# Patient Record
Sex: MALE | Race: WHITE | NOT HISPANIC OR LATINO | Employment: OTHER | URBAN - METROPOLITAN AREA
[De-identification: names, ages, dates, MRNs, and addresses within clinical notes are randomized per-mention and may not be internally consistent; named-entity substitution may affect disease eponyms.]

---

## 2017-01-11 ENCOUNTER — OFFICE VISIT (OUTPATIENT)
Dept: PSYCHIATRY | Facility: CLINIC | Age: 62
End: 2017-01-11
Payer: OTHER GOVERNMENT

## 2017-01-11 DIAGNOSIS — F41.9 ANXIETY DISORDER, UNSPECIFIED TYPE: Primary | ICD-10-CM

## 2017-01-11 PROCEDURE — 90791 PSYCH DIAGNOSTIC EVALUATION: CPT | Mod: S$GLB,,, | Performed by: SOCIAL WORKER

## 2017-01-11 NOTE — PROGRESS NOTES
Psychiatry Initial Visit (PhD/LCSW)  Diagnostic Interview - CPT 27209    Date: 2017    Site: [facility]    Referral source: Dr. Dumont    Clinical status of patient: Outpatient    Epifanio Guallpa, a 61 y.o. male, for initial evaluation visit.  Met with patient.    Chief complaint/reason for encounter: anxiety    History of present illness: Patient reports that he has been having anxiety problems. Reported that he retired 11 year ago to help care for his parents (mom  in , dad  in September). Identified that the anxiety has increased since his father . Said he has had anxiety his entire life, but it usually comes and goes. Said that the anxiety is particularly bad in the morning. Identified that once he starts doing things, the anxiety decreases. Said that he recently was diagnosed with a hernia and chipped a tooth, both of which are bothersome to him. Said that he has always been healthy. Identified that he knows he doesn't have anything to be anxious about, and it bothers him that he feels anxious. Said that he is sleeping well. Denied change in memory or concentration. Reported a history of panic attacks. Said that most frequently, he has a panic attack due to dreams about being in a confined space. Identified that he recently had a panic attack at the dentist. Said that he is going to go to an oral surgeon for repair the tooth, so he's hoping the sedation will help. Denied irritability. Identified that he is a worrier, and worries about everything. Identified that he looks at the past frequently. Said that he has always been a loner, so he doesn't have a big support system. [place] process. Endorsed symptoms of anxiety including excessive anxiety/worry and muscle tension. Denied agoraphobia. Said that he had OCD symptoms as a child, but that has gone away. Reported some feelings of depression. Said there is some  "decrease in interest. Identified that he doesn't think he has enough outside interests, but has been like that since he was a child. Denied thoughts about death or suicidal ideation. Expressed that he was close to his father, and he is probably not over his death yet. Said that his father always decreased his worry and made him feel better about things. Denied trauma history.     Discussed course of treatment. Practiced mindfulness. Gave resources for mindfulness and the [facility]. Discussed transition to new therapist.    Pain: 0    Symptoms:   · Mood: depressed mood  · Anxiety: excessive anxiety/worry, muscle tension and panic attacks  · Substance abuse: denied  · Cognitive functioning: denied  · Health behaviors: noncontributory    Psychiatric history: psychotropic management by PCP    Medical history: hernia    Family history of psychiatric illness: none    Social history (marriage, employment, etc.): Grew up in [place]. Lived with parents, brother, and sister. Reports that his childhood was "fine." Said that he was always a loner and stayed in the house a lot as a child. Said that he regrets that now. Completed HS. 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 Currently lives alone. Brother and sister want him to sell their parent's house and move [place] the lake. Never , no children. Exercises daily. Mu-ism, goes to [employer] weekly and prays daily.     Substance use:   Alcohol: 1/week   Drugs: none   Tobacco: none   Caffeine: 1 cup of coffee/day    Current medications and drug reactions (include OTC, herbal): see medication list. Started Prozac 12/28/16; said that he feels a little better. Has PRN prescription of Valium, but hasn't taken any.    Strengths and liabilities: Strength: Patient accepts guidance/feedback, Strength: Patient is expressive/articulate., Liability: Patient lacks coping skills.    Current Evaluation:     Mental Status Exam:  General Appearance:  unremarkable, age appropriate "   Speech: normal tone, normal rate, normal pitch, normal volume      Level of Cooperation: cooperative      Thought Processes: normal and logical   Mood: steady      Thought Content: normal, no suicidality, no homicidality, delusions, or paranoia   Affect: congruent and appropriate   Orientation: Oriented x3   Memory: recent >  intact, remote >  intact   Attention Span & Concentration: intact   Fund of General Knowledge: intact and appropriate to age and level of education   Abstract Reasoning: did not assess   Judgment & Insight: fair     Language  intact     Diagnostic Impression - Plan:       ICD-10-CM ICD-9-CM   1. Anxiety disorder, unspecified type F41.9 300.00       Plan:individual psychotherapy and medication management by physician    Return to Clinic: 2 weeks    Length of Service (minutes): 45     AMIRAH WilcoxW-BACS

## 2017-03-01 ENCOUNTER — TELEPHONE (OUTPATIENT)
Dept: SURGERY | Facility: CLINIC | Age: 62
End: 2017-03-01

## 2017-03-01 DIAGNOSIS — K40.90 RIGHT INGUINAL HERNIA: Primary | ICD-10-CM

## 2017-03-01 NOTE — TELEPHONE ENCOUNTER
Pt called to reschedule his right inguinal hernia repair that was postponed in November 2016.  [facility] Repair scheduled for 3/15/17 with a Pre-Op appointment with Dr. Goldberg for the day prior on 3/14/17 at 10am.  He is aware of appointment date and time and is agreeable to this plan of care.  Reminder letter mailed for the Pre-Op appointment.

## 2017-03-01 NOTE — TELEPHONE ENCOUNTER
----- Message from Faby Machado sent at 3/1/2017 10:15 AM CST -----  Contact: self  Pt would like to schedule surgery for hernia repair. Please call Epifanio cris @ [phone].Thank you.

## 2017-03-14 ENCOUNTER — TELEPHONE (OUTPATIENT)
Dept: SURGERY | Facility: CLINIC | Age: 62
End: 2017-03-14

## 2017-03-14 ENCOUNTER — OFFICE VISIT (OUTPATIENT)
Dept: SURGERY | Facility: CLINIC | Age: 62
End: 2017-03-14
Payer: OTHER GOVERNMENT

## 2017-03-14 ENCOUNTER — ANESTHESIA EVENT (OUTPATIENT)
Dept: SURGERY | Facility: HOSPITAL | Age: 62
End: 2017-03-14
Payer: OTHER GOVERNMENT

## 2017-03-14 VITALS
HEART RATE: 62 BPM | TEMPERATURE: 98 F | BODY MASS INDEX: 23.57 KG/M2 | HEIGHT: 72 IN | SYSTOLIC BLOOD PRESSURE: 137 MMHG | DIASTOLIC BLOOD PRESSURE: 85 MMHG | WEIGHT: 174 LBS

## 2017-03-14 DIAGNOSIS — K40.90 RIGHT INGUINAL HERNIA: ICD-10-CM

## 2017-03-14 PROCEDURE — 99999 PR PBB SHADOW E&M-EST. PATIENT-LVL III: CPT | Mod: PBBFAC,,, | Performed by: SURGERY

## 2017-03-14 PROCEDURE — 1160F RVW MEDS BY RX/DR IN RCRD: CPT | Mod: S$GLB,,, | Performed by: SURGERY

## 2017-03-14 PROCEDURE — 99213 OFFICE O/P EST LOW 20 MIN: CPT | Mod: 57,S$GLB,, | Performed by: SURGERY

## 2017-03-14 RX ORDER — ONDANSETRON 4 MG/1
4 TABLET, FILM COATED ORAL 2 TIMES DAILY
Qty: 12 TABLET | Refills: 3 | Status: SHIPPED | OUTPATIENT
Start: 2017-03-14 | End: 2017-03-14

## 2017-03-14 NOTE — PATIENT INSTRUCTIONS
What Is a Hernia?    A hernia is when an organ or tissue pushes through a weak area in the belly (abdominal) wall. This weak area may be present at birth. Or it may be caused by abdominal strain over time. If not treated, a hernia can get worse with time and physical stress.  When a bulge forms  When there is a weak area in the abdominal wall, an organ or tissue can push outward. This often causes a bulge that you can see under your skin. The bulge may get bigger when you stand up. It may go away when you lie down. You may also feel some pressure or mild pain when lifting, coughing, urinating, or doing other activities.  Types of hernias  The type of hernia you have depends on its location. Most hernias form in the groin at or near the internal ring. This is the entrance to a canal between the abdomen and groin. Hernias can also occur in the abdomen, thigh, or genitals.  · An incisional hernia occurs at the site of a previous surgical incision.  · An umbilical hernia occurs at the navel.  · An indirect inguinal hernia occurs in the groin at the internal ring.  · A direct inguinal hernia occurs in the groin near the internal ring.  · A femoral hernia occurs just below the groin.  · An epigastric hernia occurs in the upper abdomen at the midline.  Diagnosis  In most cases, your healthcare provider can diagnose a hernia by doing a physical exam.  In some cases it might not be clear why you have a swelling in the belly wall. Then your provider may order an imaging test such as an ultrasound. This can help with the diagnosis.  Surgery  A hernia will not heal on its own. Surgery is needed to fix the weak spot in the abdominal wall. If not treated, a hernia can get larger. It can also cause serious health problems. The good news is that hernia surgery can be done quickly and safely. In some cases, you can go home the same day as your surgery.   When to call your provider  Call your healthcare provider right away if the  swelling around your hernia becomes larger, firmer, or more painful. These may be signs that your intestines are stuck in the abdominal wall. This is an emergency. The hernia must be repaired right away to avoid serious problems.  Date Last Reviewed: 7/1/2016 © 2000-2016 [employer]. [address] [place] [address]. All rights reserved. This information is not intended as a substitute for professional medical care. Always follow your healthcare professional's instructions.        How a Hernia Develops  Although a hernia bulge may appear suddenly, hernias often take years to develop. They grow larger as pressure inside the body presses the intestines or other tissues out through a weak area in the abdominal wall, often at the belly button or a site of previous surgery. With time, these tissues can bulge out beneath the skin.  Stages of hernia development    The wall weakens or tears. The abdominal lining bulges out through a weak area and begins to form a hernia sac. The sac may contain fat, intestine, or other tissues. At this point, the hernia may or may not cause a visible bulge.        The intestine pushes into the sac. As the intestine pushes further into the sac, it forms a visible bulge. The bulge may flatten when you lie down or push against it. This is called a reducible hernia and does not cause any immediate danger.             The intestine may become trapped. The sac containing the intestine may become trapped by muscle (incarcerated). If this happens, you wont be able to flatten the bulge. You may also have pain. Prompt treatment is needed.        The intestine may become strangulated. If the intestine is tightly trapped, it becomes strangulated. The strangulated area loses blood supply and may die. This can cause severe pain and block the intestine. Emergency surgery is needed.   Date Last Reviewed: 8/1/2016  © 4588-5652 [employer]. [address] [place] [address],  JENNY Boggs [address]. All rights reserved. This information is not intended as a substitute for professional medical care. Always follow your healthcare professional's instructions.        Having Hernia Surgery: Patch Repair  Surgery treats a hernia by repairing the weakness in the abdominal wall. An incision is made so the surgeon has a direct view of the hernia. The repair is then done through this incision (open surgery). To repair the defect, special mesh materials are used to patch the weak area and make a tension-free repair. Follow your healthcare providers advice on how to get ready for the procedure. You can usually go home the same day as your surgery. In some cases, though, you may need to stay in the hospital overnight.  Getting ready for surgery  Your healthcare provider will talk with you about preparing for surgery. Follow all the instructions youre given and be sure to:   · Tell your healthcare provider about any medicines, supplements, or herbs you take. This includes both prescription and over-the-counter items.  · Stop taking aspirin, ibuprofen, naproxen and other NSAIDs as directed.  · Arrange for an adult family member or friend to give you a ride home after surgery.  · Stop smoking. Smoking affects blood flow and can slow healing.  · Gently wash the surgical area the night before surgery.  · Dont eat or drink after midnight, the night before your surgery. You may need to take some medicine with sips of water on the day of surgery. Talk with your healthcare provider.     Repair in Front           Repair in Back           Combination Repair      The day of surgery  Arrive at the hospital or surgical center at your scheduled time. Youll be asked to change into a patient gown. Youll then be given an IV to provide fluids and medicine. Shortly before surgery, an anesthesiologist or nurse anesthetist will talk with you. He or she will explain the types of anesthesia used to prevent pain during  surgery. You will have one or more of the following:  · Monitored sedation to make you relaxed and sleepy.  · Local anesthesia to numb the surgical site.  · Regional anesthesia to numb specific areas of your body.  · General anesthesia to let you sleep during surgery.  During the surgery  Most hernias are treated using tension-free repairs. This is surgery that uses special mesh materials to repair the weak area. Unlike traditional repairs, the abdominal fascia (tissue around the muscle that gives strength to the abdominal wall) isnt sewn together. Instead, the mesh covers the weak area like a patch. This repairs the defect without tension on the muscles. It also makes it less likely to happen again. The mesh is made of strong, flexible plastic that stays in the body. Over time, nearby tissues grow into the mesh to strengthen the repair.  After surgery  When the procedure is over, youll be taken to the recovery area to rest. Your blood pressure and heart rate will be monitored. Youll also have a bandage over the surgical site. To help reduce discomfort, youll be given pain medicines. You may also be given breathing exercises to keep your lungs clear. Later, youll be asked to get up and walk. This helps prevent blood clots in the legs. You can go home when your healthcare provider says youre ready.     Risks and complications  Hernia surgery is safe, but does have risks including:  · Bleeding  · Infection  · Anesthesia risks  · Mesh complications  · Inability to urinate   · Bowel or bladder injury   · Numbness or pain in the groin or leg  · Risk the hernia will happen again  · Damage to the testicles or testicular function      Date Last Reviewed: 10/1/2016  © 7765-8088 [employer]. [address] [place] [address], [place] [address]. All rights reserved. This information is not intended as a substitute for professional medical care. Always follow your healthcare professional's  instructions.        After Hernia Surgery    You can usually go home the same day as surgery. If you had surgery to repair ventral or incisional hernia, you may need to stay in the hospital overnight. To speed healing, take an active role in your recovery. The tips below can help.  Reducing swelling  Early on, its common for the area around your incision to be swollen, bruised, and sore. To reduce swelling, put an ice pack or bag of frozen peas in a thin towel. Place the towel on the swollen area 3 to 5 times a day for 15 to 20 minutes at a time.  Managing pain  Take any prescribed pain medicines as directed. Be aware that some pain medicines can cause constipation. So your healthcare provider may also suggest a laxative or stool softener.  Returning to normal  You can return to your normal routine as soon as you feel able. Just take it easy and follow these guidelines:  · Take short walks to improve circulation.  · Avoid heavy lifting for at least a week.  · Ask your healthcare provider when you can drive and return to work.  · You can have sex again when you feel ready.  Follow-up care  Be sure to keep all follow-up appointments with your healthcare provider. These ensure youre healing well. During visits, your stitches, staples, or bandage may be removed.  When to call your healthcare provider   Call your healthcare provider if you have any of the following:  · A large amount of swelling or bruising (some testicular swelling and bruising is normal)  · Fever of 100.4°F (38°C) or higher, or as directed by your healthcare provider  · Pain, redness, bleeding, or fluid from the incision that gets worse  · Trouble urinating  · Constipation  · Vomiting   Date Last Reviewed: 10/1/2016  © 8138-1337 [employer]. [address] [place] [address], [place] [address]. All rights reserved. This information is not intended as a substitute for professional medical care. Always follow your healthcare professional's  instructions.

## 2017-03-14 NOTE — PROGRESS NOTES
History & Physical    SUBJECTIVE:     History of Present Illness:  Patient is a 62 y.o. male presents with a right inguinal hernia. Per patient it developed suddenly 1/2 year ago. + discomfort especially when he lifts heavy objects.   He describes a pressure/soreness feeling associated with it. He denies any skin changes. He denies any nausea or vomiting. He states that he has never noticed a bulge in his groin, but went to see his PCP because of the pain/discomfort.    Chief Complaint   Patient presents with    Pre-op Exam       Review of patient's allergies indicates:  No Known Allergies    Current Outpatient Prescriptions   Medication Sig Dispense Refill    ADACEL,TDAP ADOLESN/ADULT,,PF, 2 Lf-(2.5-5-3-5 mcg)-5Lf/0.5 mL injection inject 0.5 milliliter intramuscularly  0    aspirin (ECOTRIN) 81 MG EC tablet Take 81 mg by mouth once daily.      FLUARIX QUAD 4282-5360, PF, 60 mcg (15 mcg x 4)/0.5 mL Syrg inject 0.5 milliliter intramuscularly  0    fluoxetine (PROZAC) 20 MG capsule Take 1 capsule (20 mg total) by mouth once daily. 30 capsule 1    multivitamin (THERAGRAN) per tablet Take 1 tablet by mouth once daily.      diazePAM (VALIUM) 5 MG tablet Take 1 tablet (5 mg total) by mouth every 6 (six) hours as needed for Anxiety. 10 tablet 0     No current facility-administered medications for this visit.        Past Medical History:   Diagnosis Date    Kidney stone     Kidney stone      Past Surgical History:   Procedure Laterality Date    TONSILLECTOMY      WISDOM TOOTH EXTRACTION       Family History   Problem Relation Age of Onset    Heart disease Father     Hypertension Father     Alzheimer's disease Mother     No Known Problems Sister     No Known Problems Brother     Heart disease Maternal Grandmother     Heart attack Maternal Grandmother     No Known Problems Maternal Grandfather     No Known Problems Paternal Grandmother     No Known Problems Paternal Grandfather      Social History    Substance Use Topics    Smoking status: Never Smoker    Smokeless tobacco: Never Used    Alcohol use 0.6 oz/week     1 Glasses of wine, 0 Standard drinks or equivalent per week      Comment: occasionally- once a week        Review of Systems:  Review of Systems   Constitutional: Negative for activity change, fever and unexpected weight change.   Respiratory: Negative for cough and shortness of breath.    Cardiovascular: Negative for chest pain and palpitations.   Gastrointestinal: Negative for abdominal distention, abdominal pain, nausea and vomiting.   Endocrine: Negative for cold intolerance and heat intolerance.   Musculoskeletal: Negative for arthralgias and myalgias.   Neurological: Negative for dizziness and light-headedness.       OBJECTIVE:     Vital Signs (Most Recent)  Temp: 98 °F (36.7 °C) (03/14/17 1003)  Pulse: 62 (03/14/17 1003)  BP: 137/85 (03/14/17 1003)  6' (1.829 m)  78.9 kg (174 lb)     Physical Exam:  Physical Exam   Constitutional: He is oriented to person, place, and time. He appears well-developed and well-nourished. No distress.   HENT:   Head: Normocephalic and atraumatic.   Cardiovascular: Normal rate and regular rhythm.    Pulmonary/Chest: Effort normal. No respiratory distress.   Abdominal: Soft. He exhibits no distension. There is no tenderness. A hernia is present.   Neurological: He is alert and oriented to person, place, and time.   Skin: Skin is warm and dry.   Psychiatric: He has a normal mood and affect. His behavior is normal.   No obvious groin bulge, possible small right inguinal hernia      ASSESSMENT/PLAN:     62 yo male with a symptomatic small right inguinal hernia    PLAN:    Open repair with mesh. I have explained the risks, benefits, and alternatives of the procedure in detail. The patient has had the opportunity to ask questions about the procedure and all questions have been answered to their satisfaction. The patient has signed informed consent and agrees to  proceed as planned.         ATTENDING ATTESTATION: Patient seen and examined. My examination confirms the resident's findings and I agree with his assessment and plan above.

## 2017-03-14 NOTE — TELEPHONE ENCOUNTER
Pt notified to [facility] tomorrow at 6:30am for surgery with Dr. Goldberg.  Pre-Op instructions given at appointment this morning in clinic.  He verbalized understanding.

## 2017-03-14 NOTE — ANESTHESIA PREPROCEDURE EVALUATION
03/14/2017    Pre-operative evaluation for Procedure(s) (LRB):  REPAIR-HERNIA-INGUINAL Open Right with Mesh (Right)    Epifanio Guallpa is a 62 y.o. male with no significant PMH who is being evaluated for the above procedure secondary to right inguinal hernia.     LDA:  None     Prev airway:  None on file     Drips: none     Patient Active Problem List   Diagnosis    Right inguinal hernia       Review of patient's allergies indicates:  No Known Allergies     No current facility-administered medications on file prior to encounter.      Current Outpatient Prescriptions on File Prior to Encounter   Medication Sig Dispense Refill    aspirin (ECOTRIN) 81 MG EC tablet Take 81 mg by mouth once daily.      multivitamin (THERAGRAN) per tablet Take 1 tablet by mouth once daily.      diazePAM (VALIUM) 5 MG tablet Take 1 tablet (5 mg total) by mouth every 6 (six) hours as needed for Anxiety. 10 tablet 0       Past Surgical History:   Procedure Laterality Date    TONSILLECTOMY      WISDOM TOOTH EXTRACTION         Social History     Social History    Marital status: Single     Spouse name: N/A    Number of children: N/A    Years of education: N/A     Occupational History    Not on file.     Social History Main Topics    Smoking status: Never Smoker    Smokeless tobacco: Never Used    Alcohol use 0.6 oz/week     1 Glasses of wine, 0 Standard drinks or equivalent per week      Comment: occasionally- once a week    Drug use: No    Sexual activity: Not Currently     Partners: Female     Other Topics Concern    Not on file     Social History Narrative         Vital Signs Range (Last 24H):  Temp:  [36.7 °C (98 °F)]   Pulse:  [62]   BP: (137)/(85)       CBC: No results for input(s): WBC, RBC, HGB, HCT, PLT, MCV, MCH, MCHC in the last 72 hours.    CMP: No results for input(s): NA, K, CL, CO2, BUN, CREATININE,  GLU, MG, PHOS, CALCIUM, ALBUMIN, PROT, ALKPHOS, ALT, AST, BILITOT in the last 72 hours.    INR  No results for input(s): INR, PROTIME, APTT in the last 72 hours.    Invalid input(s): PT        Diagnostic Studies:      EKG:Vent. Rate : 061 BPM     Atrial Rate : 061 BPM     P-R Int : 146 ms          QRS Dur : 140 ms      QT Int : 448 ms       P-R-T Axes : 054 078 045 degrees     QTc Int : 450 ms    Normal sinus rhythm  Right bundle branch block  Abnormal ECG  When compared with ECG of 31-OCT-1987 22:45,  Right bundle branch block is now Present  Confirmed by Ramírez Reilly MD ([id number]) on 11/8/2016 11:45:07 PM      2D Echo: none on file           [facility] Anesthesia Evaluation    I have reviewed the Patient Summary Reports.     I have reviewed the Medications.     Review of Systems  Anesthesia Hx:  History of prior surgery of interest to airway management or planning: Previous anesthesia: General appendectomy in childhood  with general anesthesia.  Denies Family Hx of Anesthesia complications.   Denies Personal Hx of Anesthesia complications.   Social:  Non-Smoker    Cardiovascular:  Cardiovascular Normal     Pulmonary:  Pulmonary Normal    Hepatic/GI:  Hepatic/GI Normal    Neurological:  Neurology Normal    Endocrine:  Endocrine Normal        Physical Exam  General:  Well nourished    Airway/Jaw/Neck:  Airway Findings: Mouth Opening: Normal Tongue: Normal  General Airway Assessment: Adult, Good  Mallampati: I  Improves to I with phonation.  TM Distance: Normal, at least 6 cm  Jaw/Neck Findings:     Neck ROM: Normal ROM      Dental:  Dental Findings: In tact    Chest/Lungs:  Chest/Lungs Findings: Clear to auscultation, Normal Respiratory Rate     Heart/Vascular:  Heart Findings: Rate: Normal  Rhythm: Regular Rhythm  Sounds: Normal             Anesthesia Plan  Type of Anesthesia, risks & benefits discussed:  Anesthesia Type:  MAC, general  Patient's Preference:   Intra-op Monitoring Plan: standard ASA monitors  Intra-op Monitoring  Plan Comments:   Post Op Pain Control Plan:   Post Op Pain Control Plan Comments:   Induction:   IV  Beta Blocker:  Patient is not currently on a Beta-Blocker (No further documentation required).       Informed Consent: Patient understands risks and agrees with Anesthesia plan.  Questions answered. Anesthesia consent signed with patient.  ASA Score: 1     Day of Surgery Review of History & Physical:    H&P update referred to the surgeon.         Ready For Surgery From Anesthesia Perspective.

## 2017-03-14 NOTE — MR AVS SNAPSHOT
[facility]  [address]  [place] [address]  Phone: [phone]                  Epifanio Guallpa   3/14/2017 10:00 AM   Office Visit    Description:  Male : 1955   Provider:  Joshua Goldberg, MD   Department:  [facility]           Reason for Visit     Pre-op Exam                To Do List           Your Future Surgeries/Procedures     Mar 15, 2017   Surgery with Joshua Goldberg, MD   [facility])    [address] [facility] [address] [phone]              Goals (5 Years of Data)     None      [facility]     [facility] Care Line -  Assistance  Registered nurses in the [facility] provide clinical advisement, health education, appointment booking, and other advisory services.  Call for this free service at [phone].             Medications           Message regarding Medications     Verify the changes and/or additions to your medication regime listed below are the same as discussed with your clinician today.  If any of these changes or additions are incorrect, please notify your healthcare provider.             Verify that the below list of medications is an accurate representation of the medications you are currently taking.  If none reported, the list may be blank. If incorrect, please contact your healthcare provider. Carry this list with you in case of emergency.           Current Medications     ADACEL,TDAP ADOLESN/ADULT,,PF, 2 Lf-(2.5-5-3-5 mcg)-5Lf/0.5 mL injection inject 0.5 milliliter intramuscularly    aspirin (ECOTRIN) 81 MG EC tablet Take 81 mg by mouth once daily.    FLUARIX QUAD 9519-4437, PF, 60 mcg (15 mcg x 4)/0.5 mL Syrg inject 0.5 milliliter intramuscularly    fluoxetine (PROZAC) 20 MG capsule Take 1 capsule (20 mg total) by mouth once daily.    multivitamin (THERAGRAN) per tablet Take 1 tablet by mouth once daily.    diazePAM (VALIUM) 5 MG  tablet Take 1 tablet (5 mg total) by mouth every 6 (six) hours as needed for Anxiety.           Clinical Reference Information           Your Vitals Were     BP Pulse Temp Height Weight BMI    137/85 62 98 °F (36.7 °C) (Oral) 6' (1.829 m) 78.9 kg (174 lb) 23.6 kg/m2      Blood Pressure          Most Recent Value    BP  137/85      Allergies as of 3/14/2017     No Known Allergies      Immunizations Administered on Date of Encounter - 3/14/2017     None      Instructions      What Is a Hernia?    A hernia is when an organ or tissue pushes through a weak area in the belly (abdominal) wall. This weak area may be present at birth. Or it may be caused by abdominal strain over time. If not treated, a hernia can get worse with time and physical stress.  When a bulge forms  When there is a weak area in the abdominal wall, an organ or tissue can push outward. This often causes a bulge that you can see under your skin. The bulge may get bigger when you stand up. It may go away when you lie down. You may also feel some pressure or mild pain when lifting, coughing, urinating, or doing other activities.  Types of hernias  The type of hernia you have depends on its location. Most hernias form in the groin at or near the internal ring. This is the entrance to a canal between the abdomen and groin. Hernias can also occur in the abdomen, thigh, or genitals.  · An incisional hernia occurs at the site of a previous surgical incision.  · An umbilical hernia occurs at the navel.  · An indirect inguinal hernia occurs in the groin at the internal ring.  · A direct inguinal hernia occurs in the groin near the internal ring.  · A femoral hernia occurs just below the groin.  · An epigastric hernia occurs in the upper abdomen at the midline.  Diagnosis  In most cases, your healthcare provider can diagnose a hernia by doing a physical exam.  In some cases it might not be clear why you have a swelling in the belly wall. Then your provider may  order an imaging test such as an ultrasound. This can help with the diagnosis.  Surgery  A hernia will not heal on its own. Surgery is needed to fix the weak spot in the abdominal wall. If not treated, a hernia can get larger. It can also cause serious health problems. The good news is that hernia surgery can be done quickly and safely. In some cases, you can go home the same day as your surgery.   When to call your provider  Call your healthcare provider right away if the swelling around your hernia becomes larger, firmer, or more painful. These may be signs that your intestines are stuck in the abdominal wall. This is an emergency. The hernia must be repaired right away to avoid serious problems.  Date Last Reviewed: 7/1/2016 © 2000-2016 [employer]. [address] [place] [address], [place] [address]. All rights reserved. This information is not intended as a substitute for professional medical care. Always follow your healthcare professional's instructions.        How a Hernia Develops  Although a hernia bulge may appear suddenly, hernias often take years to develop. They grow larger as pressure inside the body presses the intestines or other tissues out through a weak area in the abdominal wall, often at the belly button or a site of previous surgery. With time, these tissues can bulge out beneath the skin.  Stages of hernia development    The wall weakens or tears. The abdominal lining bulges out through a weak area and begins to form a hernia sac. The sac may contain fat, intestine, or other tissues. At this point, the hernia may or may not cause a visible bulge.        The intestine pushes into the sac. As the intestine pushes further into the sac, it forms a visible bulge. The bulge may flatten when you lie down or push against it. This is called a reducible hernia and does not cause any immediate danger.             The intestine may become trapped. The sac containing the intestine may become trapped  by muscle (incarcerated). If this happens, you wont be able to flatten the bulge. You may also have pain. Prompt treatment is needed.        The intestine may become strangulated. If the intestine is tightly trapped, it becomes strangulated. The strangulated area loses blood supply and may die. This can cause severe pain and block the intestine. Emergency surgery is needed.   Date Last Reviewed: 8/1/2016 © 2000-2016 "[employer]. [address] [place] [address], [place] [address]. All rights reserved. This information is not intended as a substitute for professional medical care. Always follow your healthcare professional's instructions.        Having Hernia Surgery: Patch Repair  Surgery treats a hernia by repairing the weakness in the abdominal wall. An incision is made so the surgeon has a direct view of the hernia. The repair is then done through this incision (open surgery). To repair the defect, special mesh materials are used to patch the weak area and make a tension-free repair. Follow your healthcare providers advice on how to get ready for the procedure. You can usually go home the same day as your surgery. In some cases, though, you may need to stay in the hospital overnight.  Getting ready for surgery  Your healthcare provider will talk with you about preparing for surgery. Follow all the instructions youre given and be sure to:   · Tell your healthcare provider about any medicines, supplements, or herbs you take. This includes both prescription and over-the-counter items.  · Stop taking aspirin, ibuprofen, naproxen and other NSAIDs as directed.  · Arrange for an adult family member or friend to give you a ride home after surgery.  · Stop smoking. Smoking affects blood flow and can slow healing.  · Gently wash the surgical area the night before surgery.  · Dont eat or drink after midnight, the night before your surgery. You may need to take some medicine with sips of water on the day of  surgery. Talk with your healthcare provider.     Repair in Front           Repair in Back           Combination Repair      The day of surgery  Arrive at the hospital or surgical center at your scheduled time. Youll be asked to change into a patient gown. Youll then be given an IV to provide fluids and medicine. Shortly before surgery, an anesthesiologist or nurse anesthetist will talk with you. He or she will explain the types of anesthesia used to prevent pain during surgery. You will have one or more of the following:  · Monitored sedation to make you relaxed and sleepy.  · Local anesthesia to numb the surgical site.  · Regional anesthesia to numb specific areas of your body.  · General anesthesia to let you sleep during surgery.  During the surgery  Most hernias are treated using tension-free repairs. This is surgery that uses special mesh materials to repair the weak area. Unlike traditional repairs, the abdominal fascia (tissue around the muscle that gives strength to the abdominal wall) isnt sewn together. Instead, the mesh covers the weak area like a patch. This repairs the defect without tension on the muscles. It also makes it less likely to happen again. The mesh is made of strong, flexible plastic that stays in the body. Over time, nearby tissues grow into the mesh to strengthen the repair.  After surgery  When the procedure is over, youll be taken to the recovery area to rest. Your blood pressure and heart rate will be monitored. Youll also have a bandage over the surgical site. To help reduce discomfort, youll be given pain medicines. You may also be given breathing exercises to keep your lungs clear. Later, youll be asked to get up and walk. This helps prevent blood clots in the legs. You can go home when your healthcare provider says youre ready.     Risks and complications  Hernia surgery is safe, but does have risks including:  · Bleeding  · Infection  · Anesthesia risks  · Mesh  complications  · Inability to urinate   · Bowel or bladder injury   · Numbness or pain in the groin or leg  · Risk the hernia will happen again  · Damage to the testicles or testicular function      Date Last Reviewed: 10/1/2016  © 1746-7831 [employer]. [address] [place] [address]. All rights reserved. This information is not intended as a substitute for professional medical care. Always follow your healthcare professional's instructions.        After Hernia Surgery    You can usually go home the same day as surgery. If you had surgery to repair ventral or incisional hernia, you may need to stay in the hospital overnight. To speed healing, take an active role in your recovery. The tips below can help.  Reducing swelling  Early on, its common for the area around your incision to be swollen, bruised, and sore. To reduce swelling, put an ice pack or bag of frozen peas in a thin towel. Place the towel on the swollen area 3 to 5 times a day for 15 to 20 minutes at a time.  Managing pain  Take any prescribed pain medicines as directed. Be aware that some pain medicines can cause constipation. So your healthcare provider may also suggest a laxative or stool softener.  Returning to normal  You can return to your normal routine as soon as you feel able. Just take it easy and follow these guidelines:  · Take short walks to improve circulation.  · Avoid heavy lifting for at least a week.  · Ask your healthcare provider when you can drive and return to work.  · You can have sex again when you feel ready.  Follow-up care  Be sure to keep all follow-up appointments with your healthcare provider. These ensure youre healing well. During visits, your stitches, staples, or bandage may be removed.  When to call your healthcare provider   Call your healthcare provider if you have any of the following:  · A large amount of swelling or bruising (some testicular swelling and bruising is normal)  · Fever of  100.4°F (38°C) or higher, or as directed by your healthcare provider  · Pain, redness, bleeding, or fluid from the incision that gets worse  · Trouble urinating  · Constipation  · Vomiting   Date Last Reviewed: 10/1/2016  © 1111-7721 [employer]. [address] [place] [address]. All rights reserved. This information is not intended as a substitute for professional medical care. Always follow your healthcare professional's instructions.             Language Assistance Services     ATTENTION: Language assistance services are available, free of charge. Please call [phone].      ATENCIÓN: Si habla dari, tiene a mercer disposición servicios gratuitos de asistencia lingüística. Llame al [phone].     CHÚ Ý: N?u b?n nói Ti?ng Vi?t, có các d?ch v? h? tr? ngôn ng? mi?n phí dành cho b?n. G?i s? [phone].         [facility] complies with applicable Federal civil rights laws and does not discriminate on the basis of race, color, national origin, age, disability, or sex.

## 2017-03-15 ENCOUNTER — SURGERY (OUTPATIENT)
Age: 62
End: 2017-03-15

## 2017-03-15 ENCOUNTER — HOSPITAL ENCOUNTER (OUTPATIENT)
Facility: HOSPITAL | Age: 62
Discharge: HOME | End: 2017-03-15
Attending: SURGERY | Admitting: SURGERY
Payer: OTHER GOVERNMENT

## 2017-03-15 ENCOUNTER — ANESTHESIA (OUTPATIENT)
Dept: SURGERY | Facility: HOSPITAL | Age: 62
End: 2017-03-15
Payer: OTHER GOVERNMENT

## 2017-03-15 DIAGNOSIS — K40.90 RIGHT INGUINAL HERNIA: ICD-10-CM

## 2017-03-15 PROBLEM — Z98.890 S/P RIGHT INGUINAL HERNIA REPAIR: Status: ACTIVE | Noted: 2017-03-15

## 2017-03-15 PROBLEM — Z87.19 S/P RIGHT INGUINAL HERNIA REPAIR: Status: ACTIVE | Noted: 2017-03-15

## 2017-03-15 PROCEDURE — 71000044 HC DOSC ROUTINE RECOVERY FIRST HOUR: Performed by: SURGERY

## 2017-03-15 PROCEDURE — 71000015 HC POSTOP RECOV 1ST HR: Performed by: SURGERY

## 2017-03-15 PROCEDURE — 49505 PRP I/HERN INIT REDUC >5 YR: CPT | Mod: RT,,, | Performed by: SURGERY

## 2017-03-15 PROCEDURE — 25000003 PHARM REV CODE 250: Performed by: SURGERY

## 2017-03-15 PROCEDURE — 36000707: Performed by: SURGERY

## 2017-03-15 PROCEDURE — 25000003 PHARM REV CODE 250: Performed by: PHYSICIAN ASSISTANT

## 2017-03-15 PROCEDURE — C1781 MESH (IMPLANTABLE): HCPCS | Performed by: SURGERY

## 2017-03-15 PROCEDURE — D9220A PRA ANESTHESIA: Mod: ,,, | Performed by: ANESTHESIOLOGY

## 2017-03-15 PROCEDURE — 63600175 PHARM REV CODE 636 W HCPCS: Performed by: STUDENT IN AN ORGANIZED HEALTH CARE EDUCATION/TRAINING PROGRAM

## 2017-03-15 PROCEDURE — 37000009 HC ANESTHESIA EA ADD 15 MINS: Performed by: SURGERY

## 2017-03-15 PROCEDURE — 36000706: Performed by: SURGERY

## 2017-03-15 PROCEDURE — 25000003 PHARM REV CODE 250: Performed by: STUDENT IN AN ORGANIZED HEALTH CARE EDUCATION/TRAINING PROGRAM

## 2017-03-15 PROCEDURE — 37000008 HC ANESTHESIA 1ST 15 MINUTES: Performed by: SURGERY

## 2017-03-15 DEVICE — MESH POLY KNIT PERFIX PLG LG+: Type: IMPLANTABLE DEVICE | Site: INGUINAL | Status: FUNCTIONAL

## 2017-03-15 RX ORDER — SODIUM CHLORIDE 9 MG/ML
INJECTION, SOLUTION INTRAVENOUS CONTINUOUS
Status: DISCONTINUED | OUTPATIENT
Start: 2017-03-15 | End: 2017-03-15 | Stop reason: HOSPADM

## 2017-03-15 RX ORDER — HYDROMORPHONE HYDROCHLORIDE 1 MG/ML
0.2 INJECTION, SOLUTION INTRAMUSCULAR; INTRAVENOUS; SUBCUTANEOUS EVERY 5 MIN PRN
Status: DISCONTINUED | OUTPATIENT
Start: 2017-03-15 | End: 2017-03-15 | Stop reason: HOSPADM

## 2017-03-15 RX ORDER — HYDROCODONE BITARTRATE AND ACETAMINOPHEN 5; 325 MG/1; MG/1
1 TABLET ORAL EVERY 6 HOURS PRN
Qty: 31 TABLET | Refills: 0 | Status: SHIPPED | OUTPATIENT
Start: 2017-03-15 | End: 2017-03-28

## 2017-03-15 RX ORDER — HYDROCODONE BITARTRATE AND ACETAMINOPHEN 10; 325 MG/1; MG/1
1 TABLET ORAL EVERY 4 HOURS PRN
Status: DISCONTINUED | OUTPATIENT
Start: 2017-03-15 | End: 2017-03-15 | Stop reason: HOSPADM

## 2017-03-15 RX ORDER — PROPOFOL 10 MG/ML
VIAL (ML) INTRAVENOUS CONTINUOUS PRN
Status: DISCONTINUED | OUTPATIENT
Start: 2017-03-15 | End: 2017-03-15

## 2017-03-15 RX ORDER — LIDOCAINE HYDROCHLORIDE 10 MG/ML
INJECTION, SOLUTION EPIDURAL; INFILTRATION; INTRACAUDAL; PERINEURAL
Status: DISCONTINUED | OUTPATIENT
Start: 2017-03-15 | End: 2017-03-15 | Stop reason: HOSPADM

## 2017-03-15 RX ORDER — BUPIVACAINE HYDROCHLORIDE 5 MG/ML
INJECTION, SOLUTION EPIDURAL; INTRACAUDAL
Status: DISCONTINUED | OUTPATIENT
Start: 2017-03-15 | End: 2017-03-15 | Stop reason: HOSPADM

## 2017-03-15 RX ORDER — IBUPROFEN 200 MG
600 TABLET ORAL ONCE
Status: DISCONTINUED | OUTPATIENT
Start: 2017-03-15 | End: 2017-03-15 | Stop reason: HOSPADM

## 2017-03-15 RX ORDER — SODIUM CHLORIDE 0.9 % (FLUSH) 0.9 %
3 SYRINGE (ML) INJECTION
Status: DISCONTINUED | OUTPATIENT
Start: 2017-03-15 | End: 2017-03-15 | Stop reason: HOSPADM

## 2017-03-15 RX ORDER — HYDROCODONE BITARTRATE AND ACETAMINOPHEN 5; 325 MG/1; MG/1
1 TABLET ORAL EVERY 4 HOURS PRN
Status: DISCONTINUED | OUTPATIENT
Start: 2017-03-15 | End: 2017-03-15 | Stop reason: HOSPADM

## 2017-03-15 RX ORDER — ACETAMINOPHEN 10 MG/ML
1000 INJECTION, SOLUTION INTRAVENOUS ONCE
Status: DISCONTINUED | OUTPATIENT
Start: 2017-03-15 | End: 2017-03-15 | Stop reason: HOSPADM

## 2017-03-15 RX ORDER — MIDAZOLAM HYDROCHLORIDE 1 MG/ML
INJECTION, SOLUTION INTRAMUSCULAR; INTRAVENOUS
Status: DISCONTINUED | OUTPATIENT
Start: 2017-03-15 | End: 2017-03-15

## 2017-03-15 RX ADMIN — SODIUM CHLORIDE, SODIUM GLUCONATE, SODIUM ACETATE, POTASSIUM CHLORIDE, MAGNESIUM CHLORIDE, SODIUM PHOSPHATE, DIBASIC, AND POTASSIUM PHOSPHATE: .53; .5; .37; .037; .03; .012; .00082 INJECTION, SOLUTION INTRAVENOUS at 08:03

## 2017-03-15 RX ADMIN — MIDAZOLAM HYDROCHLORIDE 2 MG: 1 INJECTION, SOLUTION INTRAMUSCULAR; INTRAVENOUS at 07:03

## 2017-03-15 RX ADMIN — SODIUM CHLORIDE: 0.9 INJECTION, SOLUTION INTRAVENOUS at 07:03

## 2017-03-15 RX ADMIN — PROPOFOL 50 MCG/KG/MIN: 10 INJECTION, EMULSION INTRAVENOUS at 08:03

## 2017-03-15 RX ADMIN — Medication 2 G: at 08:03

## 2017-03-15 RX ADMIN — LIDOCAINE HYDROCHLORIDE 20 ML: 10 INJECTION, SOLUTION EPIDURAL; INFILTRATION; INTRACAUDAL; PERINEURAL at 08:03

## 2017-03-15 RX ADMIN — BUPIVACAINE HYDROCHLORIDE 20 ML: 5 INJECTION, SOLUTION EPIDURAL; INTRACAUDAL; PERINEURAL at 08:03

## 2017-03-15 NOTE — IP AVS SNAPSHOT
[facility]  [address]  [facility] [address]  Phone: [phone]           Patient Discharge Instructions     Our goal is to set you up for success. This packet includes information on your condition, medications, and your home care. It will help you to care for yourself so you don't get sicker and need to go back to the hospital.     Please ask your nurse if you have any questions.        There are many details to remember when preparing to leave the hospital. Here is what you will need to do:    1. Take your medicine. If you are prescribed medications, review your Medication List in the following pages. You may have new medications to  at the pharmacy and others that you'll need to stop taking. Review the instructions for how and when to take your medications. Talk with your doctor or nurses if you are unsure of what to do.     2. Go to your follow-up appointments. Specific follow-up information is listed in the following pages. Your may be contacted by a transition nurse or clinical provider about future appointments. Be sure we have all of the phone numbers to reach you, if needed. Please contact your provider's office if you are unable to make an appointment.     3. Watch for warning signs. Your doctor or nurse will give you detailed warning signs to watch for and when to call for assistance. These instructions may also include educational information about your condition. If you experience any of warning signs to your health, call your doctor.               [facility]  Unless otherwise directed by your provider, please contact [facility], our nurse care line that is available for 24/7 assistance.     [phone] (toll-free)    Registered nurses in the [facility] provide clinical advisement, health education, appointment booking, and other advisory services.                    ** Verify the list of medication(s) below is accurate and up  to date. Carry this with you in case of emergency. If your medications have changed, please notify your healthcare provider.             Medication List      START taking these medications        Additional Info                      hydrocodone-acetaminophen 5-325mg 5-325 mg per tablet   Commonly known as:  NORCO   Quantity:  31 tablet   Refills:  0   Dose:  1 tablet    Instructions:  Take 1 tablet by mouth every 6 (six) hours as needed for Pain.     Begin Date    AM    Noon    PM    Bedtime         CONTINUE taking these medications        Additional Info                      aspirin 81 MG EC tablet   Commonly known as:  ECOTRIN   Refills:  0   Dose:  81 mg    Instructions:  Take 81 mg by mouth once daily.     Begin Date    AM    Noon    PM    Bedtime       diazePAM 5 MG tablet   Commonly known as:  VALIUM   Quantity:  10 tablet   Refills:  0   Dose:  5 mg    Instructions:  Take 1 tablet (5 mg total) by mouth every 6 (six) hours as needed for Anxiety.     Begin Date    AM    Noon    PM    Bedtime       multivitamin per tablet   Commonly known as:  THERAGRAN   Refills:  0   Dose:  1 tablet    Instructions:  Take 1 tablet by mouth once daily.     Begin Date    AM    Noon    PM    Bedtime            Where to Get Your Medications      You can get these medications from any pharmacy     Bring a paper prescription for each of these medications     hydrocodone-acetaminophen 5-325mg 5-325 mg per tablet                  Please bring to all follow up appointments:    1. A copy of your discharge instructions.  2. All medicines you are currently taking in their original bottles.  3. Identification and insurance card.    Please arrive 15 minutes ahead of scheduled appointment time.    Please call 24 hours in advance if you must reschedule your appointment and/or time.        Follow-up Information     Follow up with Joshua Goldberg, MD. Schedule an appointment as soon as possible for a visit in 2 weeks.    Specialty:  General  Surgery    Why:  s/p right inguinal hernia repair    Contact information:    [address]  [facility] [address]  [phone]          Discharge Instructions     Future Orders    Call MD for:  difficulty breathing, headache or visual disturbances     Call MD for:  redness, tenderness, or signs of infection (pain, swelling, redness, odor or green/yellow discharge around incision site)     Call MD for:  severe uncontrolled pain     Call MD for:  temperature >100.4     Diet general     Questions:    Total calories:      Fat restriction, if any:      Protein restriction, if any:      Na restriction, if any:      Fluid restriction:      Additional restrictions:      Other restrictions (specify):     Comments:    May resume diet as tolerated.   No heavy lifting or strenuous exercise until cleared by physician.  May shower in 48 hours; no baths or submerging in water (swimming,etc) for at least 2 weeks  Keep incision clean with soap and water.  Leave steri strips in place they will fall off on their own  Monitor for temp > 101.4, bleeding, redness, purulent drainage, or any extreme pain. If any occur, please call MD or go to ER.  Please resume all home meds and take newly prescribed meds.  Follow up with Dr. Goldberg in 2 weeks in clinic for post-op check. If no appointment made in 1 week, please call clinic.    Remove dressing in 48 hours         Primary Diagnosis     Your primary diagnosis was:  Status Post Right Inguinal Hernia Repair      Admission Information     Date & Time Provider Department [facility]    3/15/2017  6:12 AM Joshua Goldberg, MD [id number]      Care Providers     Provider Role Specialty Primary office phone    Joshua Goldberg, MD Attending Provider General Surgery [phone]    Joshua Goldberg, MD Surgeon  General Surgery [phone]      Your Vitals Were     BP Pulse Temp Resp Height Weight    129/80 (BP Location: Left arm, Patient Position: Lying, BP Method:  Automatic) 64 97.9 °F (36.6 °C) (Temporal) 18 6' (1.829 m) 78.9 kg (174 lb)    SpO2 BMI             99% 23.6 kg/m2         Recent Lab Values     No lab values to display.      Allergies as of 3/15/2017     No Known Allergies      Advance Directives     An advance directive is a document which, in the event you are no longer able to make decisions for yourself, tells your healthcare team what kind of treatment you do or do not want to receive, or who you would like to make those decisions for you.  If you do not currently have an advance directive, [facility] encourages you to create one.  For more information call:  [phone]), [phone] ([phone]),  or log on to [email or web address].        Language Assistance Services     ATTENTION: Language assistance services are available, free of charge. Please call [phone].      ATENCIÓN: Si habla español, tiene a mercer disposición servicios gratuitos de asistencia lingüística. Llame al [phone].     [facility] Ý: N?u b?n nói Ti?ng Vi?t, có các d?ch v? h? tr? ngôn ng? mi?n phí dành cho b?n. G?i s? [phone].         [facility] complies with applicable Federal civil rights laws and does not discriminate on the basis of race, color, national origin, age, disability, or sex.

## 2017-03-15 NOTE — H&P (VIEW-ONLY)
History & Physical    SUBJECTIVE:     History of Present Illness:  Patient is a 62 y.o. male presents with a right inguinal hernia. Per patient it developed suddenly 1/2 year ago. + discomfort especially when he lifts heavy objects.   He describes a pressure/soreness feeling associated with it. He denies any skin changes. He denies any nausea or vomiting. He states that he has never noticed a bulge in his groin, but went to see his PCP because of the pain/discomfort.    Chief Complaint   Patient presents with    Pre-op Exam       Review of patient's allergies indicates:  No Known Allergies    Current Outpatient Prescriptions   Medication Sig Dispense Refill    ADACEL,TDAP ADOLESN/ADULT,,PF, 2 Lf-(2.5-5-3-5 mcg)-5Lf/0.5 mL injection inject 0.5 milliliter intramuscularly  0    aspirin (ECOTRIN) 81 MG EC tablet Take 81 mg by mouth once daily.      FLUARIX QUAD [id number], PF, 60 mcg (15 mcg x 4)/0.5 mL Syrg inject 0.5 milliliter intramuscularly  0    fluoxetine (PROZAC) 20 MG capsule Take 1 capsule (20 mg total) by mouth once daily. 30 capsule 1    multivitamin (THERAGRAN) per tablet Take 1 tablet by mouth once daily.      diazePAM (VALIUM) 5 MG tablet Take 1 tablet (5 mg total) by mouth every 6 (six) hours as needed for Anxiety. 10 tablet 0     No current facility-administered medications for this visit.        Past Medical History:   Diagnosis Date    Kidney stone     Kidney stone      Past Surgical History:   Procedure Laterality Date    TONSILLECTOMY      WISDOM TOOTH EXTRACTION       Family History   Problem Relation Age of Onset    Heart disease Father     Hypertension Father     Alzheimer's disease Mother     No Known Problems Sister     No Known Problems Brother     Heart disease Maternal Grandmother     Heart attack Maternal Grandmother     No Known Problems Maternal Grandfather     No Known Problems Paternal Grandmother     No Known Problems Paternal Grandfather      Social History    Substance Use Topics    Smoking status: Never Smoker    Smokeless tobacco: Never Used    Alcohol use 0.6 oz/week     1 Glasses of wine, 0 Standard drinks or equivalent per week      Comment: occasionally- once a week        Review of Systems:  Review of Systems   Constitutional: Negative for activity change, fever and unexpected weight change.   Respiratory: Negative for cough and shortness of breath.    Cardiovascular: Negative for chest pain and palpitations.   Gastrointestinal: Negative for abdominal distention, abdominal pain, nausea and vomiting.   Endocrine: Negative for cold intolerance and heat intolerance.   Musculoskeletal: Negative for arthralgias and myalgias.   Neurological: Negative for dizziness and light-headedness.       OBJECTIVE:     Vital Signs (Most Recent)  Temp: 98 °F (36.7 °C) (03/14/17 1003)  Pulse: 62 (03/14/17 1003)  BP: 137/85 (03/14/17 1003)  6' (1.829 m)  78.9 kg (174 lb)     Physical Exam:  Physical Exam   Constitutional: He is oriented to person, place, and time. He appears well-developed and well-nourished. No distress.   HENT:   Head: Normocephalic and atraumatic.   Cardiovascular: Normal rate and regular rhythm.    Pulmonary/Chest: Effort normal. No respiratory distress.   Abdominal: Soft. He exhibits no distension. There is no tenderness. A hernia is present.   Neurological: He is alert and oriented to person, place, and time.   Skin: Skin is warm and dry.   Psychiatric: He has a normal mood and affect. His behavior is normal.   No obvious groin bulge, possible small right inguinal hernia      ASSESSMENT/PLAN:     62 yo male with a symptomatic small right inguinal hernia    PLAN:    Open repair with mesh. I have explained the risks, benefits, and alternatives of the procedure in detail. The patient has had the opportunity to ask questions about the procedure and all questions have been answered to their satisfaction. The patient has signed informed consent and agrees to  proceed as planned.         ATTENDING ATTESTATION: Patient seen and examined. My examination confirms the resident's findings and I agree with his assessment and plan above.

## 2017-03-15 NOTE — TRANSFER OF CARE
Anesthesia Transfer of Care Note    Patient: Epifanio Guallpa    Procedure(s) Performed: Procedure(s) (LRB):  REPAIR-HERNIA-INGUINAL Open Right with Mesh (Right)    Patient location: [facility]    Anesthesia Type: MAC    Transport from OR: Transported from OR on 6-10 L/min O2 by face mask with adequate spontaneous ventilation    Post pain: adequate analgesia    Post assessment: no apparent anesthetic complications    Post vital signs: stable    Level of consciousness: awake and alert    Nausea/Vomiting: no nausea/vomiting    Complications: none          Last vitals:   Visit Vitals    BP (!) 147/81 (BP Location: Left arm, Patient Position: Lying, BP Method: Automatic)    Pulse 68    Temp 36.8 °C (98.2 °F) (Oral)    Resp 18    Ht 6' (1.829 m)    Wt 78.9 kg (174 lb)    SpO2 99%    BMI 23.6 kg/m2

## 2017-03-15 NOTE — ANESTHESIA RELEASE NOTE
Anesthesia Release from PACU Note    Patient: Epifanio Guallpa    Procedure(s) Performed: Procedure(s) (LRB):  REPAIR-HERNIA-INGUINAL Open Right with Mesh (Right)    Anesthesia type: general    Post pain: Adequate analgesia    Post assessment: no apparent anesthetic complications, tolerated procedure well and no evidence of recall    Last Vitals:   Visit Vitals    /80 (BP Location: Left arm, Patient Position: Lying, BP Method: Automatic)    Pulse 64    Temp 36.6 °C (97.9 °F) (Temporal)    Resp 18    Ht 6' (1.829 m)    Wt 78.9 kg (174 lb)    SpO2 99%    BMI 23.6 kg/m2       Post vital signs: stable    Level of consciousness: awake, alert  and oriented    Nausea/Vomiting: no nausea/no vomiting    Complications: none    Airway Patency: patent    Respiratory: unassisted, spontaneous ventilation    Cardiovascular: stable and blood pressure at baseline    Hydration: euvolemic

## 2017-03-15 NOTE — ANESTHESIA POSTPROCEDURE EVALUATION
Anesthesia Post Evaluation    Patient: Epifanio Guallpa    Procedure(s) Performed: Procedure(s) (LRB):  REPAIR-HERNIA-INGUINAL Open Right with Mesh (Right)    Final Anesthesia Type: general  Patient location during evaluation: PACU  Patient participation: Yes- Able to Participate  Level of consciousness: awake and alert  Post-procedure vital signs: reviewed and stable  Pain management: adequate  Airway patency: patent  PONV status at discharge: No PONV  Anesthetic complications: no      Cardiovascular status: blood pressure returned to baseline and hemodynamically stable  Respiratory status: unassisted, spontaneous ventilation and room air  Hydration status: euvolemic  Follow-up not needed.        Visit Vitals    /80 (BP Location: Left arm, Patient Position: Lying, BP Method: Automatic)    Pulse 64    Temp 36.6 °C (97.9 °F) (Temporal)    Resp 18    Ht 6' (1.829 m)    Wt 78.9 kg (174 lb)    SpO2 99%    BMI 23.6 kg/m2       Pain/Mike Score: Pain Assessment Performed: Yes (3/15/2017  9:45 AM)  Presence of Pain: denies (3/15/2017  9:45 AM)  Mike Score: 10 (3/15/2017  9:45 AM)

## 2017-03-15 NOTE — PLAN OF CARE
Problem: Patient Care Overview  Goal: Plan of Care Review  Outcome: Outcome(s) achieved Date Met:  03/15/17  Discharge instructions given and explained to patient and family with verbalization of understanding all instructions. Prescription given and explained next time and doses of each medication. Patients v/s stable, denies n/v and tolerating po, rates pain level tolerable, IV removed, and family at bedside for patient discharge home. Anesthesia and surgical consent in pt's chart at time of discharge.

## 2017-03-15 NOTE — BRIEF OP NOTE
[facility]  Brief Operative Note     SUMMARY     Surgery Date: 3/15/2017     Surgeon(s) and Role:     * Joshua Goldberg, MD - Primary     * Hardeep Henriquez MD - Resident - Assisting    Pre-op Diagnosis:  Right inguinal hernia [K40.90]    Post-op Diagnosis:  Post-Op Diagnosis Codes:     * Right inguinal hernia [K40.90]    Procedure(s) (LRB):  REPAIR-HERNIA-INGUINAL Open Right with Mesh (Right)    Anesthesia: Local MAC    Description of the findings of the procedure: plug and patch repair of R inguinal hernia    Findings/Key Components: indirect hernia with cord lipoma, successful repair    Estimated Blood Loss: minimal          Specimens:   Specimen     None          Discharge Note    SUMMARY     Admit Date: 3/15/2017    Discharge Date and Time:  03/15/2017     Hospital Course (synopsis of major diagnoses, care, treatment, and services provided during the course of the hospital stay): The patient underwent Procedure(s) (LRB):  REPAIR-HERNIA-INGUINAL Open Right with Mesh (Right). The patient tolerated the outpatient procedure without issue and was discharged home.      Final Diagnosis: Post-Op Diagnosis Codes:     * Right inguinal hernia [K40.90]    Disposition: Home or Self Care    Follow Up/Patient Instructions:   May resume diet as tolerated.   No heavy lifting or strenuous exercise until cleared by physician.  May shower in 48 hours; no baths or submerging in water (swimming,etc) for at least 2 weeks  Keep incision clean with soap and water.  Leave steri strips in place they will fall off on their own  Monitor for temp > 101.4, bleeding, redness, purulent drainage, or any extreme pain. If any occur, please call MD or go to ER.  Please resume all home meds and take newly prescribed meds.  Follow up with Dr. Goldberg in 2 weeks in clinic for post-op check. If no appointment made in 1 week, please call clinic.      Medications:  Reconciled Home Medications:   Current Discharge Medication List       CONTINUE these medications which have NOT CHANGED    Details   aspirin (ECOTRIN) 81 MG EC tablet Take 81 mg by mouth once daily.      multivitamin (THERAGRAN) per tablet Take 1 tablet by mouth once daily.      diazePAM (VALIUM) 5 MG tablet Take 1 tablet (5 mg total) by mouth every 6 (six) hours as needed for Anxiety.  Qty: 10 tablet, Refills: 0    Associated Diagnoses: Anxiety           No discharge procedures on file.

## 2017-03-15 NOTE — PROGRESS NOTES
Page to resident for Dr. Goldberg to notify pt still needs updated to H&P, waiting for return call.

## 2017-03-15 NOTE — INTERVAL H&P NOTE
The patient has been examined and the H&P has been reviewed:    I concur with the findings and no changes have occurred since H&P was written.    Anesthesia/Surgery risks, benefits and alternative options discussed and understood by patient/family.          Active Hospital Problems    Diagnosis  POA    Right inguinal hernia [K40.90]  Yes      Resolved Hospital Problems    Diagnosis Date Resolved POA   No resolved problems to display.

## 2017-03-15 NOTE — OP NOTE
DATE OF PROCEDURE: 03/15/2017     PREOPERATIVE DIAGNOSIS: Right inguinal hernia.     POSTOPERATIVE DIAGNOSIS: Right inguinal hernia.     PROCEDURE PERFORMED: Right indirect inguinal hernia repair with mesh.     ATTENDING SURGEON: Joshua Goldberg, M.D.     HOUSESTAFF SURGEON: Hardeep Henriquez M.D. (RES)     ANESTHESIA: Monitored anesthesia care plus local.     ESTIMATED BLOOD LOSS: 10 mL.     FINDINGS: Right-sized indirect inguinal hernia repaired with plug-and-patch polypropylene mesh.    SPECIMEN: none     DRAINS: None.     COMPLICATIONS: None.     INDICATIONS: Epifanio Guallpa is a 62 y.o.male referred to my General Surgery Clinic with a history of a symptomatic, reducible inguinal bulge. The history and exam were consistent with inguinal hernia. We recommended an open inguinal hernia repair with mesh and the patient agreed to proceed. The patient signed informed consent and expressed understanding of the risks and benefits of surgery.     DESCRIPTION OF OPERATIVE PROCEDURE: The patient was identified in preoperative holding and brought back to the Operating Room. The patient was placed supine on the operating table and padded appropriately. Monitors were applied and monitored anesthesia care was initiated. His right groin was shaved with electric clippers and prepped and draped in the standard sterile surgical fashion. A timeout was performed and all team members present agreed this was the correct procedure on the correct side of the correct patient. We also confirmed administration of appropriate preoperative antibiotics.     We marked out an appropriate right inguinal incision and administered a mix of lidocaine and Marcaine. After assuring adequate local anesthesia, a 5 cm oblique skin incision was made. Subcutaneous tissue was divided with Bovie electrocautery. This dissection was carried down through [place] fascia down to the aponeurosis of the external oblique. The aponeurosis of the external oblique was  incised in line with its fibers, first with a scalpel and then Metzenbaum scissors, and this incision was extended to the external spermatic ring. The inguinal canal contents were bluntly encircled, first digitally and then with a Penrose drain. Weitlaner retractors were placed to facilitate the dissection. We proceeded to identify an indirect inguinal hernia sac, which contained a cord lipoma with no evidence of bowel involvement. The hernia sac was carefully dissected away from the inguinal canal contents until it could be reduced into the defect.     We performed skeletonization of the spermatic cord. We then had appropriate borders of the inguinal canal identified and decided to repair the defect with a piece of polypropylene mesh and Large Bard plug. The plug was placed into the defect and secured with 2-0 Prolene sutures. The mesh was cut to fit the defect appropriately and sewn in place with interrupted 2-0 Prolene suture. Medially, the mesh was anchored to the fascia overlying the pubic tubercle. Inferiorly, the mesh was anchored to the shelving edge of the inguinal ligament. Superiorly, the mesh was anchored to the conjoined tendon. The tails of the mesh were brought together around the cord structures creating a new internal ring that just admitted the tip of the finger. The mesh was inspected and noted to lie in appropriate position without any redundancy or tension. The testicle was pulled back down to the scrotum and there was noted to be no tension on the cord structures. The aponeurosis of the external oblique was closed with a running 3-0 Vicryl suture. [place] fascia was closed with several buried interrupted 3-0 Vicryl sutures and the skin was closed with a running subcuticular Monocryl suture. A sterile dressing was applied. The patient was then transported to the Recovery Room in stable condition. All sponge, instrument and needle counts were correct at the end of the case. I was present and  scrubbed for the entire procedure.

## 2017-03-16 VITALS
HEIGHT: 72 IN | BODY MASS INDEX: 23.57 KG/M2 | TEMPERATURE: 98 F | WEIGHT: 174 LBS | OXYGEN SATURATION: 100 % | HEART RATE: 52 BPM | SYSTOLIC BLOOD PRESSURE: 136 MMHG | DIASTOLIC BLOOD PRESSURE: 81 MMHG | RESPIRATION RATE: 18 BRPM

## 2017-03-17 ENCOUNTER — TELEPHONE (OUTPATIENT)
Dept: SURGERY | Facility: CLINIC | Age: 62
End: 2017-03-17

## 2017-03-17 NOTE — TELEPHONE ENCOUNTER
----- Message from Farida Foy sent at 3/17/2017  9:03 AM CDT -----  Contact: self  Pt is calling to inform Dr Goldberg that he is constipated.  He has tried an enema and suppository, but has not had any luck.  He has been trying to pass the stool for about three hours today.     Please call pt to discuss.  Pt can be reached at [phone]

## 2017-03-17 NOTE — TELEPHONE ENCOUNTER
Pt called to report that he is constipated and feels that he has a large, hard stool in the rectum that he is unable to pass.  He denies nausea/vomiting or severe abdominal pain.  He is s/p RIH Repair from 2 days ago on 3/15/17.  He states that he is not passing gas.  He has tried OTC stool softener, laxative, suppository, and an enema without success.  He states that he has not been drinking fluids.  He will increase his fluid intake, including warm fruit juices and tea, and will go to the ER with continued inability to pass gas, or c/o of nausea/vomiting or abdominal pain.  He verbalized understanding and is agreeable to this plan of care.

## 2017-03-28 ENCOUNTER — OFFICE VISIT (OUTPATIENT)
Dept: SURGERY | Facility: CLINIC | Age: 62
End: 2017-03-28
Payer: OTHER GOVERNMENT

## 2017-03-28 VITALS
DIASTOLIC BLOOD PRESSURE: 75 MMHG | TEMPERATURE: 99 F | HEART RATE: 61 BPM | HEIGHT: 72 IN | WEIGHT: 167 LBS | SYSTOLIC BLOOD PRESSURE: 134 MMHG | BODY MASS INDEX: 22.62 KG/M2

## 2017-03-28 DIAGNOSIS — Z87.19 S/P RIGHT INGUINAL HERNIA REPAIR: Primary | ICD-10-CM

## 2017-03-28 DIAGNOSIS — Z98.890 S/P RIGHT INGUINAL HERNIA REPAIR: Primary | ICD-10-CM

## 2017-03-28 PROCEDURE — 99999 PR PBB SHADOW E&M-EST. PATIENT-LVL III: CPT | Mod: PBBFAC,,, | Performed by: PHYSICIAN ASSISTANT

## 2017-03-28 PROCEDURE — 99024 POSTOP FOLLOW-UP VISIT: CPT | Mod: S$GLB,,, | Performed by: PHYSICIAN ASSISTANT

## 2017-03-28 NOTE — PROGRESS NOTES
SUBJECTIVE:  The patient is a 62 y.o. y/o male 2 weeks s/p right inguinal hernia. He denies pain, fevers, chills, nausea, vomiting, diarrhea, or constipation. Eating well with normal appetite and bowel function. Denies redness around or drainage from incisions.    OBJECTIVE:  GEN: male in NAD  ABD: soft, non-tender, non-distended  INCISIONS: clean, dry and intact, healing well without signs of infection or hernia    ASSESSMENT/PLAN:  Doing well 2 weeks s/p right inguinal hernia repair. Patient is advised to avoid heavy lifting or strenuous activity for another 2-4 weeks. May resume light cardio. Patient may bathe and continue to take a regular diet. Will follow-up with me on an as-needed basis. All questions answered; patient is comfortable with follow-up plan.

## 2018-03-08 ENCOUNTER — HOSPITAL ENCOUNTER (OUTPATIENT)
Dept: RADIOLOGY | Facility: HOSPITAL | Age: 63
Discharge: HOME | End: 2018-03-08
Attending: FAMILY MEDICINE
Payer: OTHER GOVERNMENT

## 2018-03-08 ENCOUNTER — OFFICE VISIT (OUTPATIENT)
Dept: FAMILY MEDICINE | Facility: CLINIC | Age: 63
End: 2018-03-08
Payer: OTHER GOVERNMENT

## 2018-03-08 VITALS
RESPIRATION RATE: 16 BRPM | OXYGEN SATURATION: 97 % | WEIGHT: 174.63 LBS | TEMPERATURE: 99 F | SYSTOLIC BLOOD PRESSURE: 132 MMHG | BODY MASS INDEX: 23.65 KG/M2 | HEART RATE: 65 BPM | DIASTOLIC BLOOD PRESSURE: 90 MMHG | HEIGHT: 72 IN

## 2018-03-08 DIAGNOSIS — R06.02 SOB (SHORTNESS OF BREATH): ICD-10-CM

## 2018-03-08 DIAGNOSIS — M79.605 LEFT LEG PAIN: ICD-10-CM

## 2018-03-08 DIAGNOSIS — R06.02 SOB (SHORTNESS OF BREATH): Primary | ICD-10-CM

## 2018-03-08 PROCEDURE — 93005 ELECTROCARDIOGRAM TRACING: CPT | Mod: S$GLB,,, | Performed by: FAMILY MEDICINE

## 2018-03-08 PROCEDURE — 71046 X-RAY EXAM CHEST 2 VIEWS: CPT | Mod: TC,FY,PO

## 2018-03-08 PROCEDURE — 93010 ELECTROCARDIOGRAM REPORT: CPT | Mod: S$GLB,,, | Performed by: INTERNAL MEDICINE

## 2018-03-08 PROCEDURE — 71046 X-RAY EXAM CHEST 2 VIEWS: CPT | Mod: 26,,, | Performed by: RADIOLOGY

## 2018-03-08 PROCEDURE — 99999 PR PBB SHADOW E&M-EST. PATIENT-LVL IV: CPT | Mod: PBBFAC,,, | Performed by: FAMILY MEDICINE

## 2018-03-08 PROCEDURE — 99214 OFFICE O/P EST MOD 30 MIN: CPT | Mod: S$GLB,,, | Performed by: FAMILY MEDICINE

## 2018-03-08 NOTE — PROGRESS NOTES
"Subjective:       Patient ID: Epifanio Guallpa is a 63 y.o. male.    Chief Complaint: Shortness of Breath and Leg Problem (over 1 yr left leg pressure)    HPI    Anxiety - currently very stable - he si doing well off of all medications.     SOB - onset 1 year ago.     Episodes last from minutes to hours, but most often lasts on the order of minutes.     Exacerbated by activity, but not consistent    It is not severe to where he has to stop and sit down, but is able to continue his currently activity.    On avg episode # 3-4.     Severity of episodes has not worsened or improved    Pt walks 1 hour in the morning and 1 in the evening which normally does not bother him at all.     L leg "heaviness" that is int x 1 year. No triggering events. Nothing relives the sx. Last on the order of minutes.     1/10 on pain scale.     Both of these complaints he can live with, he just want to make sure he si not blowing something off.      Outpatient Prescriptions Marked as Taking for the 3/8/18 encounter (Office Visit) with Ayaz Dumont MD   Medication Sig Dispense Refill    aspirin (ECOTRIN) 81 MG EC tablet Take 81 mg by mouth once daily.      garlic Cap Take by mouth once daily.       multivitamin (THERAGRAN) per tablet Take 1 tablet by mouth once daily.         Past Medical History:   Diagnosis Date    Anxiety     Kidney stone     Kidney stone        Family History   Problem Relation Age of Onset    Heart disease Father     Hypertension Father     Alzheimer's disease Mother     No Known Problems Sister     No Known Problems Brother     Heart disease Maternal Grandmother     Heart attack Maternal Grandmother     No Known Problems Maternal Grandfather     No Known Problems Paternal Grandmother     No Known Problems Paternal Grandfather         reports that he has never smoked. He has never used smokeless tobacco. He reports that he drinks about 0.6 oz of alcohol per week . He reports that he does not " use drugs.    Review of Systems   Constitutional: Negative for chills and fever.   Respiratory: Positive for shortness of breath. Negative for cough, choking, chest tightness, wheezing and stridor.    Cardiovascular: Negative for chest pain, palpitations and leg swelling.   Neurological: Negative for dizziness, syncope, facial asymmetry, speech difficulty, weakness, light-headedness and numbness.       Objective:     Vitals:    03/08/18 1503   BP: (!) 132/90   Pulse: 65   Resp: 16   Temp: 99.1 °F (37.3 °C)        Physical Exam   Constitutional: He appears well-developed. No distress.   HENT:   Head: Normocephalic and atraumatic.   Eyes: Conjunctivae and EOM are normal.   Cardiovascular: Normal rate and regular rhythm.  Exam reveals no gallop and no friction rub.    No murmur heard.  Pulses:       Dorsalis pedis pulses are 2+ on the left side.        Posterior tibial pulses are 2+ on the left side.   Pulmonary/Chest: Effort normal and breath sounds normal. No respiratory distress. He has no wheezes. He has no rales. He exhibits no tenderness.   Musculoskeletal: He exhibits no edema.        Left foot: There is normal range of motion and no deformity.   No gross extremity deformity   Feet:   Left Foot:   Skin Integrity: Negative for ulcer, blister, skin breakdown, erythema, warmth, callus or dry skin.   Neurological: He is alert.   Psychiatric: He has a normal mood and affect. His behavior is normal.   Nursing note and vitals reviewed.    nl LLE cap refill < 2secs.  Assessment:       1. SOB (shortness of breath)    2. Left leg pain        Plan:       Epifanio was seen today for shortness of breath and leg problem.    Diagnoses and all orders for this visit:    SOB (shortness of breath)  Pts sxs are not consistent, but do suggest possible cardiopulmonary disease so will obtain studies as above.     Pt will notify of any changes to his sxs or new associated sxs or for any other concern.    Pt deferred seeing cards pending  results.    .EKG as read by me is NSR @ ~60bpm, nl axis, RBBB, no acute MI.      Left leg pain    Pts sxs are inconsistent, and with a nl LLE exam including circ, risk of serious disease is felt to be low. Pt has opted to forgo ELIANA or other testing at [facility], but will update me on any changes.           Follow-up in about 3 months (around 6/8/2018) for SOB and LLE pain.      Pt verbalized understanding and agreed with our plan.

## 2018-03-08 NOTE — PROGRESS NOTES
Health Maintenance      Hepatitis C Screening Consult with pcp     Colonoscopy  pt decline     Zoster Vaccine Unknown for chicken pox's

## 2018-03-13 ENCOUNTER — TELEPHONE (OUTPATIENT)
Dept: FAMILY MEDICINE | Facility: CLINIC | Age: 63
End: 2018-03-13

## 2018-03-13 NOTE — TELEPHONE ENCOUNTER
Pt calling regarding scheduled for 2d echo. Given scheduling phone number to advise of scheduling.

## 2018-03-13 NOTE — TELEPHONE ENCOUNTER
----- Message from Sarah Foy sent at 3/13/2018 11:03 AM CDT -----  Contact: Self   Patient says the hospital has not called him to schedule the test he need to have and says Dr. Dumont told him to notify him if they had not called . Please call patient at  [phone].

## 2018-03-14 ENCOUNTER — HOSPITAL ENCOUNTER (OUTPATIENT)
Dept: CARDIOLOGY | Facility: HOSPITAL | Age: 63
Discharge: HOME | End: 2018-03-14
Attending: FAMILY MEDICINE
Payer: OTHER GOVERNMENT

## 2018-03-14 DIAGNOSIS — R06.02 SOB (SHORTNESS OF BREATH): ICD-10-CM

## 2018-03-14 LAB
DIASTOLIC DYSFUNCTION: NO
ESTIMATED PA SYSTOLIC PRESSURE: 31.04
GLOBAL PERICARDIAL EFFUSION: NORMAL
MITRAL VALVE MOBILITY: NORMAL
MITRAL VALVE REGURGITATION: NORMAL
RETIRED EF AND QEF - SEE NOTES: 55 (ref 55–65)
TRICUSPID VALVE REGURGITATION: NORMAL

## 2018-03-14 PROCEDURE — 93306 TTE W/DOPPLER COMPLETE: CPT | Mod: 26,,, | Performed by: INTERNAL MEDICINE

## 2018-03-14 PROCEDURE — 93306 TTE W/DOPPLER COMPLETE: CPT

## 2018-03-16 ENCOUNTER — TELEPHONE (OUTPATIENT)
Dept: FAMILY MEDICINE | Facility: CLINIC | Age: 63
End: 2018-03-16

## 2018-03-16 NOTE — TELEPHONE ENCOUNTER
----- Message from Ayaz Dumont MD sent at 3/15/2018  5:23 PM CDT -----  Please notify patient results of his echo was great except for his lung blood pressure which was a little high.  Nothing emergent needs to be done. Please have the patient follow up with me in 2-4 weeks to discuss if not already scheduled in this time frame. Thanks.

## 2018-04-02 ENCOUNTER — OFFICE VISIT (OUTPATIENT)
Dept: FAMILY MEDICINE | Facility: CLINIC | Age: 63
End: 2018-04-02
Payer: OTHER GOVERNMENT

## 2018-04-02 VITALS
DIASTOLIC BLOOD PRESSURE: 80 MMHG | SYSTOLIC BLOOD PRESSURE: 134 MMHG | RESPIRATION RATE: 16 BRPM | WEIGHT: 173.94 LBS | HEART RATE: 58 BPM | BODY MASS INDEX: 23.56 KG/M2 | HEIGHT: 72 IN | TEMPERATURE: 98 F | OXYGEN SATURATION: 97 %

## 2018-04-02 DIAGNOSIS — R06.09 DOE (DYSPNEA ON EXERTION): Primary | ICD-10-CM

## 2018-04-02 PROCEDURE — 99213 OFFICE O/P EST LOW 20 MIN: CPT | Mod: S$GLB,,, | Performed by: FAMILY MEDICINE

## 2018-04-02 PROCEDURE — 99999 PR PBB SHADOW E&M-EST. PATIENT-LVL III: CPT | Mod: PBBFAC,,, | Performed by: FAMILY MEDICINE

## 2018-04-02 NOTE — PROGRESS NOTES
Subjective:       Patient ID: Epifanio Guallpa is a 63 y.o. male.    Chief Complaint: Results (follow results echo)    HPI    SOB - pts sxs have improved since I saw him last. He did have a mild episode of VILLASENOR, but this quickly resolved with rest.     Doesn't bother him with exercise.     Reviewed EKG and echo results which do not explain his sxs.         Outpatient Prescriptions Marked as Taking for the 4/2/18 encounter (Office Visit) with Ayaz Dumont MD   Medication Sig Dispense Refill    aspirin (ECOTRIN) 81 MG EC tablet Take 81 mg by mouth once daily.      CALCIUM ORAL Take by mouth once daily.      garlic Cap Take by mouth once daily.       multivitamin (THERAGRAN) per tablet Take 1 tablet by mouth once daily.         Past Medical History:   Diagnosis Date    Anxiety     Kidney stone     Kidney stone        Family History   Problem Relation Age of Onset    Heart disease Father     Hypertension Father     Alzheimer's disease Mother     No Known Problems Sister     No Known Problems Brother     Heart disease Maternal Grandmother     Heart attack Maternal Grandmother     No Known Problems Maternal Grandfather     No Known Problems Paternal Grandmother     No Known Problems Paternal Grandfather         reports that he has never smoked. He has never used smokeless tobacco. He reports that he drinks about 0.6 oz of alcohol per week . He reports that he does not use drugs.    Review of Systems   Constitutional: Negative for chills and fever.   Respiratory: Negative for shortness of breath and wheezing.    Cardiovascular: Negative for chest pain.        VILLASENOR       Objective:     Vitals:    04/02/18 0817   BP: 134/80   Pulse: (!) 58   Resp: 16   Temp: 98.2 °F (36.8 °C)        Physical Exam   Constitutional: He appears well-developed. No distress.   HENT:   Head: Normocephalic and atraumatic.   Eyes: Conjunctivae are normal. No scleral icterus.   Pulmonary/Chest: Effort normal.    Neurological: He is alert.   Psychiatric: He has a normal mood and affect. His behavior is normal.   Nursing note and vitals reviewed.      Assessment:       1. VILLASENOR (dyspnea on exertion)        Plan:       Epifanio was seen today for results.    Diagnoses and all orders for this visit:    VILLASENOR (dyspnea on exertion)    Symptoms have improved since I saw the patient last time. He has opted for watchful waiting at this time.  The pros and cons of additional testing referrals were discussed with the patient Pt will consider PFTs or cards referral if his sxs worsen.     Pt verbalized understanding and agreed with our plan.           Follow-up in about 3 months (around 7/2/2018) for VILLASENOR.

## 2018-04-02 NOTE — PROGRESS NOTES
Health Maintenance      Hepatitis C Screening Decline     Colonoscopy Decline     Zoster Vaccine Unknown hx chickenpox

## 2018-07-02 ENCOUNTER — TELEPHONE (OUTPATIENT)
Dept: FAMILY MEDICINE | Facility: CLINIC | Age: 63
End: 2018-07-02

## 2018-07-02 ENCOUNTER — OFFICE VISIT (OUTPATIENT)
Dept: FAMILY MEDICINE | Facility: CLINIC | Age: 63
End: 2018-07-02
Payer: OTHER GOVERNMENT

## 2018-07-02 VITALS
WEIGHT: 173.94 LBS | BODY MASS INDEX: 23.56 KG/M2 | HEIGHT: 72 IN | OXYGEN SATURATION: 97 % | RESPIRATION RATE: 20 BRPM | HEART RATE: 57 BPM | DIASTOLIC BLOOD PRESSURE: 86 MMHG | SYSTOLIC BLOOD PRESSURE: 140 MMHG | TEMPERATURE: 99 F

## 2018-07-02 DIAGNOSIS — I45.10 RBBB: ICD-10-CM

## 2018-07-02 DIAGNOSIS — E78.89 LIPIDS ABNORMAL: ICD-10-CM

## 2018-07-02 DIAGNOSIS — R06.09 DOE (DYSPNEA ON EXERTION): Primary | ICD-10-CM

## 2018-07-02 DIAGNOSIS — Z00.00 ANNUAL PHYSICAL EXAM: Primary | ICD-10-CM

## 2018-07-02 PROCEDURE — 99214 OFFICE O/P EST MOD 30 MIN: CPT | Mod: S$GLB,,, | Performed by: FAMILY MEDICINE

## 2018-07-02 PROCEDURE — 99999 PR PBB SHADOW E&M-EST. PATIENT-LVL IV: CPT | Mod: PBBFAC,,, | Performed by: FAMILY MEDICINE

## 2018-07-02 NOTE — PROGRESS NOTES
Subjective:       Patient ID: Epifanio Guallpa is a 63 y.o. male.    Chief Complaint: VILLASENOR (3 month follow up)    HPI    VILLASENOR - Pts sxs have almost completely resolved! He thinks that it was anxiety now that he reflects back on hsi sxs. He is pleased with the improvement. Of note he still walks about 1-2 hours per day!    HLD - Pt brought labs in from 4/18 and from 8/17 See below. He is concerned about his most recent values and is here to discuss his options.                Colon cancer screen - Pt uses stool screening yearly with his insurance - LORE         Outpatient Prescriptions Marked as Taking for the 7/2/18 encounter (Office Visit) with Ayaz Dumont MD   Medication Sig Dispense Refill    aspirin (ECOTRIN) 81 MG EC tablet Take 81 mg by mouth once daily.      CALCIUM ORAL Take by mouth once daily.      garlic Cap Take by mouth once daily.       multivitamin (THERAGRAN) per tablet Take 1 tablet by mouth once daily.         Past Medical History:   Diagnosis Date    Anxiety     Kidney stone     Kidney stone        Family History   Problem Relation Age of Onset    Heart disease Father     Hypertension Father     Alzheimer's disease Mother     No Known Problems Sister     No Known Problems Brother     Heart disease Maternal Grandmother     Heart attack Maternal Grandmother     No Known Problems Maternal Grandfather     No Known Problems Paternal Grandmother     No Known Problems Paternal Grandfather         reports that he has never smoked. He has never used smokeless tobacco. He reports that he drinks about 0.6 oz of alcohol per week . He reports that he does not use drugs.    Review of Systems   Respiratory: Negative for chest tightness and shortness of breath.    Cardiovascular: Negative for chest pain and palpitations.       Objective:     Vitals:    07/02/18 0811   BP: (!) 140/86   Pulse: (!) 57   Resp: 20   Temp: 98.7 °F (37.1 °C)        Physical Exam   Constitutional: He appears  well-developed. No distress.   HENT:   Head: Normocephalic and atraumatic.   Eyes: Conjunctivae are normal. No scleral icterus.   Pulmonary/Chest: Effort normal.   Neurological: He is alert.   Psychiatric: He has a normal mood and affect. His behavior is normal.   Nursing note and vitals reviewed.      Assessment:       1. VILLASENOR (dyspnea on exertion)    2. RBBB    3. Lipids abnormal        Plan:       Epifanio was seen today for villasenor.    Diagnoses and all orders for this visit:    VILLASENOR (dyspnea on exertion)  Subsequent encounter - significantly improved! Will continue to follow his sxs.   Likely secondary to Anxiety vs deconditioning    RBBB  chronic - stable - EKG unchanged since ekg in 2016.     Lipids abnormal  - will recheck fasting lipids this week.          Follow-up in about 3 months (around 10/2/2018) for Annual Physical.      Pt verbalized understanding and agreed with our plan.     At least 25 minutes were spent today with the patient in the office, which more than 50% of the time was spent on evaluation and counseling regarding The primary encounter diagnosis was VILLASENOR (dyspnea on exertion). Diagnoses of RBBB and Lipids abnormal were also pertinent to this visit..

## 2018-07-02 NOTE — PROGRESS NOTES
Health Maintenance      Hepatitis C Screening Decline     Colonoscopy  Decline     Zoster Vaccine Unknown Hx Chicken pox

## 2018-07-09 ENCOUNTER — LAB VISIT (OUTPATIENT)
Dept: LAB | Facility: HOSPITAL | Age: 63
End: 2018-07-09
Attending: FAMILY MEDICINE
Payer: OTHER GOVERNMENT

## 2018-07-09 DIAGNOSIS — Z00.00 ANNUAL PHYSICAL EXAM: ICD-10-CM

## 2018-07-09 LAB
ALBUMIN SERPL BCP-MCNC: 3.8 G/DL
ALP SERPL-CCNC: 64 U/L
ALT SERPL W/O P-5'-P-CCNC: 12 U/L
ANION GAP SERPL CALC-SCNC: 8 MMOL/L
AST SERPL-CCNC: 18 U/L
BILIRUB SERPL-MCNC: 0.7 MG/DL
BUN SERPL-MCNC: 14 MG/DL
CALCIUM SERPL-MCNC: 8.9 MG/DL
CHLORIDE SERPL-SCNC: 106 MMOL/L
CHOLEST SERPL-MCNC: 186 MG/DL
CHOLEST/HDLC SERPL: 3.3 {RATIO}
CO2 SERPL-SCNC: 25 MMOL/L
CREAT SERPL-MCNC: 1 MG/DL
EST. GFR  (AFRICAN AMERICAN): >60 ML/MIN/1.73 M^2
EST. GFR  (NON AFRICAN AMERICAN): >60 ML/MIN/1.73 M^2
GLUCOSE SERPL-MCNC: 86 MG/DL
HCV AB SERPL QL IA: NEGATIVE
HDLC SERPL-MCNC: 56 MG/DL
HDLC SERPL: 30.1 %
LDLC SERPL CALC-MCNC: 116.2 MG/DL
NONHDLC SERPL-MCNC: 130 MG/DL
POTASSIUM SERPL-SCNC: 4.6 MMOL/L
PROT SERPL-MCNC: 6.6 G/DL
SODIUM SERPL-SCNC: 139 MMOL/L
TRIGL SERPL-MCNC: 69 MG/DL

## 2018-07-09 PROCEDURE — 86803 HEPATITIS C AB TEST: CPT

## 2018-07-09 PROCEDURE — 80061 LIPID PANEL: CPT

## 2018-07-09 PROCEDURE — 36415 COLL VENOUS BLD VENIPUNCTURE: CPT | Mod: PO

## 2018-07-09 PROCEDURE — 80053 COMPREHEN METABOLIC PANEL: CPT

## 2018-08-20 DIAGNOSIS — Z12.11 COLON CANCER SCREENING: ICD-10-CM

## 2018-09-21 LAB — HEMOCCULT STL QL IA: NORMAL

## 2018-10-03 ENCOUNTER — OFFICE VISIT (OUTPATIENT)
Dept: FAMILY MEDICINE | Facility: CLINIC | Age: 63
End: 2018-10-03
Payer: OTHER GOVERNMENT

## 2018-10-03 VITALS
OXYGEN SATURATION: 97 % | RESPIRATION RATE: 16 BRPM | TEMPERATURE: 99 F | BODY MASS INDEX: 23.26 KG/M2 | HEIGHT: 72 IN | HEART RATE: 60 BPM | SYSTOLIC BLOOD PRESSURE: 124 MMHG | WEIGHT: 171.75 LBS | DIASTOLIC BLOOD PRESSURE: 82 MMHG

## 2018-10-03 DIAGNOSIS — Z00.00 ANNUAL PHYSICAL EXAM: Primary | ICD-10-CM

## 2018-10-03 DIAGNOSIS — Z23 NEED FOR INFLUENZA VACCINATION: ICD-10-CM

## 2018-10-03 PROCEDURE — 90471 IMMUNIZATION ADMIN: CPT | Mod: S$GLB,,, | Performed by: FAMILY MEDICINE

## 2018-10-03 PROCEDURE — 90686 IIV4 VACC NO PRSV 0.5 ML IM: CPT | Mod: S$GLB,,, | Performed by: FAMILY MEDICINE

## 2018-10-03 PROCEDURE — 99999 PR PBB SHADOW E&M-EST. PATIENT-LVL III: CPT | Mod: PBBFAC,,, | Performed by: FAMILY MEDICINE

## 2018-10-03 PROCEDURE — 99396 PREV VISIT EST AGE 40-64: CPT | Mod: 25,S$GLB,, | Performed by: FAMILY MEDICINE

## 2018-10-03 NOTE — PROGRESS NOTES
Subjective:       Patient ID: Epifanio Guallpa is a 63 y.o. male.    Chief Complaint: Annual Exam    HPI    Annual Physical    Diet: 8/10 - lots of fruits and veggies, but does eat a lot of lean cuisine meals as he does not cook.      Exercise: walking one - two hours a day at least 5 days a week.     Immunizations required: flu today, shingles - will consider    Cancer screenings required: colon cancer screen    Other screenings required: None    Acute complaints today: None    HIV screen? No        Current Outpatient Medications on File Prior to Visit   Medication Sig Dispense Refill    aspirin (ECOTRIN) 81 MG EC tablet Take 81 mg by mouth once daily.      CALCIUM ORAL Take by mouth once daily.      garlic Cap Take by mouth once daily.       multivitamin (THERAGRAN) per tablet Take 1 tablet by mouth once daily.       No current facility-administered medications on file prior to visit.        Past Medical History:   Diagnosis Date    Anxiety     Kidney stone     Kidney stone        Family History   Problem Relation Age of Onset    Heart disease Father     Hypertension Father     Alzheimer's disease Mother     No Known Problems Sister     No Known Problems Brother     Heart disease Maternal Grandmother     Heart attack Maternal Grandmother     No Known Problems Maternal Grandfather     No Known Problems Paternal Grandmother     No Known Problems Paternal Grandfather         reports that  has never smoked. he has never used smokeless tobacco. He reports that he drinks about 0.6 oz of alcohol per week. He reports that he does not use drugs.    Review of Systems    Objective:     Vitals:    10/03/18 0741   BP: 124/82   Pulse: 60   Resp: 16   Temp: 98.7 °F (37.1 °C)        Physical Exam   Constitutional: He appears well-developed. No distress.   HENT:   Head: Normocephalic and atraumatic.   Eyes: Conjunctivae and EOM are normal.   Cardiovascular: Normal rate and regular rhythm. Exam reveals no gallop  and no friction rub.   No murmur heard.  Pulmonary/Chest: Effort normal and breath sounds normal. No respiratory distress. He has no wheezes. He has no rales. He exhibits no tenderness.   Musculoskeletal: He exhibits no edema.   No gross extremity deformity   Neurological: He is alert.   Psychiatric: He has a normal mood and affect. His behavior is normal.   Nursing note and vitals reviewed.      Assessment:       1. Annual physical exam    2. Need for influenza vaccination        Plan:       Epifanio was seen today for annual exam.    Diagnoses and all orders for this visit:    Annual physical exam    Need for influenza vaccination  -     Influenza - Quadrivalent (3 years & older) (PF)            Follow-up in about 1 year (around 10/3/2019) for Annual Physical.        Pt verbalized understanding and agreed with our plan.

## 2018-10-03 NOTE — PROGRESS NOTES
Health Maintenance     Colonoscopy Done fitkit with [employer] 9/21/2018 - records copy and will be scanned to chart     Zoster Vaccine Unknown history chickenpox    Influenza Vaccine Pending orders ; ok to get today

## 2018-10-06 ENCOUNTER — TELEPHONE (OUTPATIENT)
Dept: ADMINISTRATIVE | Facility: HOSPITAL | Age: 63
End: 2018-10-06

## 2019-09-11 ENCOUNTER — OFFICE VISIT (OUTPATIENT)
Dept: FAMILY MEDICINE | Facility: CLINIC | Age: 64
End: 2019-09-11
Payer: OTHER GOVERNMENT

## 2019-09-11 VITALS
BODY MASS INDEX: 23.29 KG/M2 | WEIGHT: 171.94 LBS | SYSTOLIC BLOOD PRESSURE: 136 MMHG | OXYGEN SATURATION: 98 % | HEIGHT: 72 IN | DIASTOLIC BLOOD PRESSURE: 78 MMHG | HEART RATE: 60 BPM | TEMPERATURE: 99 F | RESPIRATION RATE: 20 BRPM

## 2019-09-11 DIAGNOSIS — R39.12 BENIGN PROSTATIC HYPERPLASIA WITH WEAK URINARY STREAM: Primary | ICD-10-CM

## 2019-09-11 DIAGNOSIS — Z00.00 ANNUAL PHYSICAL EXAM: ICD-10-CM

## 2019-09-11 DIAGNOSIS — R10.9 LEFT FLANK PAIN: ICD-10-CM

## 2019-09-11 DIAGNOSIS — N40.1 BENIGN PROSTATIC HYPERPLASIA WITH WEAK URINARY STREAM: Primary | ICD-10-CM

## 2019-09-11 LAB
BILIRUB SERPL-MCNC: NEGATIVE MG/DL
BLOOD URINE, POC: NEGATIVE
COLOR, POC UA: YELLOW
GLUCOSE UR QL STRIP: NORMAL
KETONES UR QL STRIP: NEGATIVE
LEUKOCYTE ESTERASE URINE, POC: NEGATIVE
NITRITE, POC UA: NEGATIVE
PH, POC UA: 7
PROTEIN, POC: NEGATIVE
SPECIFIC GRAVITY, POC UA: 1
UROBILINOGEN, POC UA: NORMAL

## 2019-09-11 PROCEDURE — 99214 OFFICE O/P EST MOD 30 MIN: CPT | Mod: 25,S$GLB,, | Performed by: FAMILY MEDICINE

## 2019-09-11 PROCEDURE — 81002 POCT URINE DIPSTICK WITHOUT MICROSCOPE: ICD-10-PCS | Mod: S$GLB,,, | Performed by: FAMILY MEDICINE

## 2019-09-11 PROCEDURE — 99214 PR OFFICE/OUTPT VISIT, EST, LEVL IV, 30-39 MIN: ICD-10-PCS | Mod: 25,S$GLB,, | Performed by: FAMILY MEDICINE

## 2019-09-11 PROCEDURE — 99999 PR PBB SHADOW E&M-EST. PATIENT-LVL III: ICD-10-PCS | Mod: PBBFAC,,, | Performed by: FAMILY MEDICINE

## 2019-09-11 PROCEDURE — 81002 URINALYSIS NONAUTO W/O SCOPE: CPT | Mod: S$GLB,,, | Performed by: FAMILY MEDICINE

## 2019-09-11 PROCEDURE — 99999 PR PBB SHADOW E&M-EST. PATIENT-LVL III: CPT | Mod: PBBFAC,,, | Performed by: FAMILY MEDICINE

## 2019-09-11 RX ORDER — TAMSULOSIN HYDROCHLORIDE 0.4 MG/1
0.4 CAPSULE ORAL DAILY
Qty: 90 CAPSULE | Refills: 1 | Status: SHIPPED | OUTPATIENT
Start: 2019-09-11 | End: 2019-10-24

## 2019-09-11 NOTE — PROGRESS NOTES
Subjective:       Patient ID: Epifanio Guallpa is a 64 y.o. male.    Chief Complaint: Flank Pain (left)    HPI   63 yo male presents for l flank pain. Is worried he has a kidney stones. States the pain is intermittent. Does not have the pain right now. Denies blood in urine. Endorses weak stream for some time.     Review of Systems   Constitutional: Negative.    HENT: Negative.    Respiratory: Negative.    Cardiovascular: Negative.    Gastrointestinal: Negative.    Endocrine: Negative.    Genitourinary: Positive for flank pain. Frequency: l.   Neurological: Negative.    Psychiatric/Behavioral: Negative.           Past Medical History:   Diagnosis Date    Anxiety     Kidney stone     Kidney stone      Past Surgical History:   Procedure Laterality Date    REPAIR-HERNIA-INGUINAL Open Right with Mesh Right 3/15/2017    Performed by Joshua Goldberg, MD at [facility] OR [address] [place] [address]    TONSILLECTOMY      WISDOM TOOTH EXTRACTION       Family History   Problem Relation Age of Onset    Heart disease Father     Hypertension Father     Alzheimer's disease Mother     No Known Problems Sister     No Known Problems Brother     Heart disease Maternal Grandmother     Heart attack Maternal Grandmother     No Known Problems Maternal Grandfather     No Known Problems Paternal Grandmother     No Known Problems Paternal Grandfather      Social History     Socioeconomic History    Marital status: Single     Spouse name: Not on file    Number of children: Not on file    Years of education: Not on file    Highest education level: Not on file   Occupational History    Not on file   Social Needs    Financial resource strain: Not on file    Food insecurity:     Worry: Not on file     Inability: Not on file    Transportation needs:     Medical: Not on file     Non-medical: Not on file   Tobacco Use    Smoking status: Never Smoker    Smokeless tobacco: Never Used   Substance and Sexual Activity    Alcohol use: Yes      Alcohol/week: 0.6 oz     Types: 1 Glasses of wine per week     Comment: occasionally- once a week    Drug use: No    Sexual activity: Not Currently     Partners: Female   Lifestyle    Physical activity:     Days per week: Not on file     Minutes per session: Not on file    Stress: Not on file   Relationships    Social connections:     Talks on phone: Not on file     Gets together: Not on file     Attends Taoism service: Not on file     Active member of club or organization: Not on file     Attends meetings of clubs or organizations: Not on file     Relationship status: Not on file   Other Topics Concern    Not on file   Social History Narrative    Not on file       Current Outpatient Medications:     aspirin (ECOTRIN) 81 MG EC tablet, Take 81 mg by mouth once daily., Disp: , Rfl:     CALCIUM ORAL, Take by mouth once daily., Disp: , Rfl:     garlic Cap, Take by mouth once daily. , Disp: , Rfl:     multivitamin (THERAGRAN) per tablet, Take 1 tablet by mouth once daily., Disp: , Rfl:     tamsulosin (FLOMAX) 0.4 mg Cap, Take 1 capsule (0.4 mg total) by mouth once daily., Disp: 90 capsule, Rfl: 1   Objective:      Vitals:    09/11/19 0829   BP: 136/78   BP Location: Right arm   Patient Position: Sitting   BP Method: Medium (Manual)   Pulse: 60   Resp: 20   Temp: 99 °F (37.2 °C)   TempSrc: Oral   SpO2: 98%   Weight: 78 kg (171 lb 15.3 oz)   Height: 6' (1.829 m)       Physical Exam   Constitutional: He is oriented to person, place, and time. No distress.   HENT:   Head: Normocephalic and atraumatic.   Eyes: Conjunctivae are normal.   Neck: Neck supple.   Cardiovascular: Normal rate, regular rhythm and normal heart sounds. Exam reveals no gallop and no friction rub.   No murmur heard.  Pulmonary/Chest: Effort normal and breath sounds normal. He has no wheezes. He has no rales.   Neurological: He is alert and oriented to person, place, and time.   Skin: Skin is warm and dry.   Psychiatric: He has a normal mood  and affect. His behavior is normal. Judgment and thought content normal.          Assessment:       1. Benign prostatic hyperplasia with weak urinary stream    2. Left flank pain    3. Annual physical exam        Plan:       Benign prostatic hyperplasia with weak urinary stream  -     tamsulosin (FLOMAX) 0.4 mg Cap; Take 1 capsule (0.4 mg total) by mouth once daily.  Dispense: 90 capsule; Refill: 1  -     PSA, Screening; Future; Expected date: 09/11/2019    Left flank pain  - Dip was normal. Advised pt to hydrate. Can take nsaids for pain.  -     POCT URINE DIPSTICK WITHOUT MICROSCOPE  -     tamsulosin (FLOMAX) 0.4 mg Cap; Take 1 capsule (0.4 mg total) by mouth once daily.  Dispense: 90 capsule; Refill: 1    Annual physical exam  -     CBC auto differential; Future; Expected date: 09/11/2019  -     Comprehensive metabolic panel; Future; Expected date: 09/11/2019  -     Hemoglobin A1c; Future; Expected date: 09/11/2019  -     TSH; Future; Expected date: 09/11/2019  -     Lipid panel; Future; Expected date: 09/11/2019  -     PSA, Screening; Future; Expected date: 09/11/2019    Blood work for next visit in 1 month.            Leonel Wylie MD

## 2019-10-11 ENCOUNTER — LAB VISIT (OUTPATIENT)
Dept: LAB | Facility: HOSPITAL | Age: 64
End: 2019-10-11
Attending: FAMILY MEDICINE
Payer: OTHER GOVERNMENT

## 2019-10-11 DIAGNOSIS — R39.12 BENIGN PROSTATIC HYPERPLASIA WITH WEAK URINARY STREAM: ICD-10-CM

## 2019-10-11 DIAGNOSIS — Z00.00 ANNUAL PHYSICAL EXAM: ICD-10-CM

## 2019-10-11 DIAGNOSIS — N40.1 BENIGN PROSTATIC HYPERPLASIA WITH WEAK URINARY STREAM: ICD-10-CM

## 2019-10-11 LAB
ALBUMIN SERPL BCP-MCNC: 4.1 G/DL (ref 3.5–5.2)
ALP SERPL-CCNC: 67 U/L (ref 55–135)
ALT SERPL W/O P-5'-P-CCNC: 13 U/L (ref 10–44)
ANION GAP SERPL CALC-SCNC: 4 MMOL/L (ref 8–16)
AST SERPL-CCNC: 19 U/L (ref 10–40)
BASOPHILS # BLD AUTO: 0.01 K/UL (ref 0–0.2)
BASOPHILS NFR BLD: 0.2 % (ref 0–1.9)
BILIRUB SERPL-MCNC: 0.9 MG/DL (ref 0.1–1)
BUN SERPL-MCNC: 17 MG/DL (ref 8–23)
CALCIUM SERPL-MCNC: 9.6 MG/DL (ref 8.7–10.5)
CHLORIDE SERPL-SCNC: 107 MMOL/L (ref 95–110)
CHOLEST SERPL-MCNC: 211 MG/DL (ref 120–199)
CHOLEST/HDLC SERPL: 4.1 {RATIO} (ref 2–5)
CO2 SERPL-SCNC: 30 MMOL/L (ref 23–29)
COMPLEXED PSA SERPL-MCNC: 8.6 NG/ML (ref 0–4)
CREAT SERPL-MCNC: 1 MG/DL (ref 0.5–1.4)
DIFFERENTIAL METHOD: ABNORMAL
EOSINOPHIL # BLD AUTO: 0.1 K/UL (ref 0–0.5)
EOSINOPHIL NFR BLD: 1.6 % (ref 0–8)
ERYTHROCYTE [DISTWIDTH] IN BLOOD BY AUTOMATED COUNT: 12.6 % (ref 11.5–14.5)
EST. GFR  (AFRICAN AMERICAN): >60 ML/MIN/1.73 M^2
EST. GFR  (NON AFRICAN AMERICAN): >60 ML/MIN/1.73 M^2
ESTIMATED AVG GLUCOSE: 91 MG/DL (ref 68–131)
GLUCOSE SERPL-MCNC: 86 MG/DL (ref 70–110)
HBA1C MFR BLD HPLC: 4.8 % (ref 4–5.6)
HCT VFR BLD AUTO: 44.4 % (ref 40–54)
HDLC SERPL-MCNC: 51 MG/DL (ref 40–75)
HDLC SERPL: 24.2 % (ref 20–50)
HGB BLD-MCNC: 15.4 G/DL (ref 14–18)
IMM GRANULOCYTES # BLD AUTO: 0.02 K/UL (ref 0–0.04)
IMM GRANULOCYTES NFR BLD AUTO: 0.4 % (ref 0–0.5)
LDLC SERPL CALC-MCNC: 144.4 MG/DL (ref 63–159)
LYMPHOCYTES # BLD AUTO: 1.4 K/UL (ref 1–4.8)
LYMPHOCYTES NFR BLD: 24.8 % (ref 18–48)
MCH RBC QN AUTO: 32.6 PG (ref 27–31)
MCHC RBC AUTO-ENTMCNC: 34.7 G/DL (ref 32–36)
MCV RBC AUTO: 94 FL (ref 82–98)
MONOCYTES # BLD AUTO: 0.6 K/UL (ref 0.3–1)
MONOCYTES NFR BLD: 10 % (ref 4–15)
NEUTROPHILS # BLD AUTO: 3.5 K/UL (ref 1.8–7.7)
NEUTROPHILS NFR BLD: 63 % (ref 38–73)
NONHDLC SERPL-MCNC: 160 MG/DL
NRBC BLD-RTO: 0 /100 WBC
PLATELET # BLD AUTO: 215 K/UL (ref 150–350)
PMV BLD AUTO: 10.5 FL (ref 9.2–12.9)
POTASSIUM SERPL-SCNC: 4.6 MMOL/L (ref 3.5–5.1)
PROT SERPL-MCNC: 7 G/DL (ref 6–8.4)
RBC # BLD AUTO: 4.73 M/UL (ref 4.6–6.2)
SODIUM SERPL-SCNC: 141 MMOL/L (ref 136–145)
TRIGL SERPL-MCNC: 78 MG/DL (ref 30–150)
TSH SERPL DL<=0.005 MIU/L-ACNC: 0.91 UIU/ML (ref 0.4–4)
WBC # BLD AUTO: 5.6 K/UL (ref 3.9–12.7)

## 2019-10-11 PROCEDURE — 80061 LIPID PANEL: CPT

## 2019-10-11 PROCEDURE — 80053 COMPREHEN METABOLIC PANEL: CPT

## 2019-10-11 PROCEDURE — 84443 ASSAY THYROID STIM HORMONE: CPT

## 2019-10-11 PROCEDURE — 36415 COLL VENOUS BLD VENIPUNCTURE: CPT | Mod: PO

## 2019-10-11 PROCEDURE — 83036 HEMOGLOBIN GLYCOSYLATED A1C: CPT

## 2019-10-11 PROCEDURE — 85025 COMPLETE CBC W/AUTO DIFF WBC: CPT

## 2019-10-11 PROCEDURE — 84153 ASSAY OF PSA TOTAL: CPT

## 2019-10-15 ENCOUNTER — OFFICE VISIT (OUTPATIENT)
Dept: FAMILY MEDICINE | Facility: CLINIC | Age: 64
End: 2019-10-15
Payer: OTHER GOVERNMENT

## 2019-10-15 VITALS
HEIGHT: 72 IN | HEART RATE: 62 BPM | SYSTOLIC BLOOD PRESSURE: 136 MMHG | BODY MASS INDEX: 23.05 KG/M2 | OXYGEN SATURATION: 97 % | WEIGHT: 170.19 LBS | TEMPERATURE: 98 F | DIASTOLIC BLOOD PRESSURE: 74 MMHG | RESPIRATION RATE: 16 BRPM

## 2019-10-15 DIAGNOSIS — Z00.00 ANNUAL PHYSICAL EXAM: Primary | ICD-10-CM

## 2019-10-15 DIAGNOSIS — Z12.11 ENCOUNTER FOR FIT (FECAL IMMUNOCHEMICAL TEST) SCREENING: ICD-10-CM

## 2019-10-15 DIAGNOSIS — Z23 NEED FOR INFLUENZA VACCINATION: ICD-10-CM

## 2019-10-15 DIAGNOSIS — E78.5 HYPERLIPIDEMIA, UNSPECIFIED HYPERLIPIDEMIA TYPE: ICD-10-CM

## 2019-10-15 DIAGNOSIS — R97.20 ELEVATED PSA: ICD-10-CM

## 2019-10-15 PROCEDURE — 99396 PREV VISIT EST AGE 40-64: CPT | Mod: 25,S$GLB,, | Performed by: FAMILY MEDICINE

## 2019-10-15 PROCEDURE — 90471 IMMUNIZATION ADMIN: CPT | Mod: S$GLB,,, | Performed by: FAMILY MEDICINE

## 2019-10-15 PROCEDURE — 90471 FLU VACCINE (QUAD) GREATER THAN OR EQUAL TO 3YO PRESERVATIVE FREE IM: ICD-10-PCS | Mod: S$GLB,,, | Performed by: FAMILY MEDICINE

## 2019-10-15 PROCEDURE — 99396 PR PREVENTIVE VISIT,EST,40-64: ICD-10-PCS | Mod: 25,S$GLB,, | Performed by: FAMILY MEDICINE

## 2019-10-15 PROCEDURE — 99999 PR PBB SHADOW E&M-EST. PATIENT-LVL IV: CPT | Mod: PBBFAC,,, | Performed by: FAMILY MEDICINE

## 2019-10-15 PROCEDURE — 99999 PR PBB SHADOW E&M-EST. PATIENT-LVL IV: ICD-10-PCS | Mod: PBBFAC,,, | Performed by: FAMILY MEDICINE

## 2019-10-15 PROCEDURE — 90686 IIV4 VACC NO PRSV 0.5 ML IM: CPT | Mod: S$GLB,,, | Performed by: FAMILY MEDICINE

## 2019-10-15 PROCEDURE — 90686 FLU VACCINE (QUAD) GREATER THAN OR EQUAL TO 3YO PRESERVATIVE FREE IM: ICD-10-PCS | Mod: S$GLB,,, | Performed by: FAMILY MEDICINE

## 2019-10-15 NOTE — PROGRESS NOTES
Subjective:       Patient ID: Epifanio Guallpa is a 64 y.o. male.    Chief Complaint: Annual Exam      HPI   64-year-old male presents for annual exam.  Patient had blood work prior to office visit.  Patient states his left flank pain has improved.  States the pain occurs intermittently.  Lab work showed patient has elevated PSA above 8.0.  Patient has not seen a urologist previously.  States Flomax helped slightly with his urine stream.  Denies any other issues at this time.      Review of Systems   Constitutional: Negative.    HENT: Negative.    Respiratory: Negative.    Cardiovascular: Negative.    Gastrointestinal: Negative.    Endocrine: Negative.    Genitourinary: Negative.    Musculoskeletal: Negative.    Neurological: Negative.    Psychiatric/Behavioral: Negative.           Past Medical History:   Diagnosis Date    Anxiety     Kidney stone     Kidney stone      Past Surgical History:   Procedure Laterality Date    TONSILLECTOMY      WISDOM TOOTH EXTRACTION       Family History   Problem Relation Age of Onset    Heart disease Father     Hypertension Father     Alzheimer's disease Mother     No Known Problems Sister     No Known Problems Brother     Heart disease Maternal Grandmother     Heart attack Maternal Grandmother     No Known Problems Maternal Grandfather     No Known Problems Paternal Grandmother     No Known Problems Paternal Grandfather      Social History     Socioeconomic History    Marital status: Single     Spouse name: Not on file    Number of children: Not on file    Years of education: Not on file    Highest education level: Not on file   Occupational History    Not on file   Social Needs    Financial resource strain: Not on file    Food insecurity:     Worry: Not on file     Inability: Not on file    Transportation needs:     Medical: Not on file     Non-medical: Not on file   Tobacco Use    Smoking status: Never Smoker    Smokeless tobacco: Never Used   Substance  and Sexual Activity    Alcohol use: Yes     Alcohol/week: 1.0 standard drinks     Types: 1 Glasses of wine per week     Comment: occasionally- once a week    Drug use: No    Sexual activity: Not Currently     Partners: Female   Lifestyle    Physical activity:     Days per week: Not on file     Minutes per session: Not on file    Stress: Not on file   Relationships    Social connections:     Talks on phone: Not on file     Gets together: Not on file     Attends [employer] service: Not on file     Active member of club or organization: Not on file     Attends meetings of clubs or organizations: Not on file     Relationship status: Not on file   Other Topics Concern    Not on file   Social History Narrative    Not on file       Current Outpatient Medications:     aspirin (ECOTRIN) 81 MG EC tablet, Take 81 mg by mouth once daily., Disp: , Rfl:     CALCIUM ORAL, Take by mouth once daily., Disp: , Rfl:     garlic Cap, Take by mouth once daily. , Disp: , Rfl:     multivitamin (THERAGRAN) per tablet, Take 1 tablet by mouth once daily., Disp: , Rfl:     tamsulosin (FLOMAX) 0.4 mg Cap, Take 1 capsule (0.4 mg total) by mouth once daily., Disp: 90 capsule, Rfl: 1   Objective:      Vitals:    10/15/19 0848   BP: 136/74   BP Location: Right arm   Patient Position: Sitting   BP Method: Medium (Manual)   Pulse: 62   Resp: 16   Temp: 98.1 °F (36.7 °C)   TempSrc: Oral   SpO2: 97%   Weight: 77.2 kg (170 lb 3.1 oz)   Height: 6' (1.829 m)       Physical Exam   Constitutional: He is oriented to person, place, and time. No distress.   HENT:   Head: Normocephalic and atraumatic.   Eyes: Conjunctivae are normal.   Neck: Neck supple.   Cardiovascular: Normal rate, regular rhythm and normal heart sounds. Exam reveals no gallop and no friction rub.   No murmur heard.  Pulmonary/Chest: Effort normal and breath sounds normal. He has no wheezes. He has no rales.   Neurological: He is alert and oriented to person, place, and time.    Skin: Skin is warm and dry.   Psychiatric: He has a normal mood and affect. His behavior is normal. Judgment and thought content normal.          Assessment:       1. Annual physical exam    2. Encounter for FIT (fecal immunochemical test) screening    3. Elevated PSA    4. Need for influenza vaccination    5. Hyperlipidemia, unspecified hyperlipidemia type        Plan:       Annual physical exam  Advised patient about diet and exercise.  Encounter for FIT (fecal immunochemical test) screening  -     Fecal Immunochemical Test (iFOBT); Future; Expected date: 10/15/2019    Elevated PSA  - elevated PSA.  Will refer to Urology.  -     Ambulatory referral to Urology    Need for influenza vaccination  -     Influenza - Quadrivalent (6 months+) (PF)    Hyperlipidemia, unspecified hyperlipidemia type  - advised patient to take fish oil supplements 2-4 g a day.  Repeat lipids in 6 months.  -     Lipid panel; Future; Expected date: 10/15/2019  -     Comprehensive metabolic panel; Future; Expected date: 10/15/2019      Follow up in about 6 months (around 4/15/2020).            Leonel Wylie MD

## 2019-10-15 NOTE — PROGRESS NOTES
Pt stated that his insurance company mails him a stool kit and he will bring in documentation.

## 2019-10-22 ENCOUNTER — LAB VISIT (OUTPATIENT)
Dept: LAB | Facility: HOSPITAL | Age: 64
End: 2019-10-22
Attending: FAMILY MEDICINE
Payer: OTHER GOVERNMENT

## 2019-10-22 DIAGNOSIS — Z12.11 ENCOUNTER FOR FIT (FECAL IMMUNOCHEMICAL TEST) SCREENING: ICD-10-CM

## 2019-10-22 PROCEDURE — 82274 ASSAY TEST FOR BLOOD FECAL: CPT

## 2019-10-23 ENCOUNTER — PATIENT OUTREACH (OUTPATIENT)
Dept: ADMINISTRATIVE | Facility: OTHER | Age: 64
End: 2019-10-23

## 2019-10-24 ENCOUNTER — OFFICE VISIT (OUTPATIENT)
Dept: UROLOGY | Facility: CLINIC | Age: 64
End: 2019-10-24
Payer: OTHER GOVERNMENT

## 2019-10-24 VITALS
WEIGHT: 171.5 LBS | HEIGHT: 72 IN | SYSTOLIC BLOOD PRESSURE: 140 MMHG | BODY MASS INDEX: 23.23 KG/M2 | DIASTOLIC BLOOD PRESSURE: 92 MMHG

## 2019-10-24 DIAGNOSIS — R35.1 NOCTURIA MORE THAN TWICE PER NIGHT: ICD-10-CM

## 2019-10-24 DIAGNOSIS — N20.0 KIDNEY STONES: ICD-10-CM

## 2019-10-24 DIAGNOSIS — N13.8 BPH WITH OBSTRUCTION/LOWER URINARY TRACT SYMPTOMS: ICD-10-CM

## 2019-10-24 DIAGNOSIS — R97.20 ELEVATED PSA: Primary | ICD-10-CM

## 2019-10-24 DIAGNOSIS — N40.1 BPH WITH OBSTRUCTION/LOWER URINARY TRACT SYMPTOMS: ICD-10-CM

## 2019-10-24 LAB
BILIRUB SERPL-MCNC: NORMAL MG/DL
BLOOD URINE, POC: NORMAL
COLOR, POC UA: YELLOW
GLUCOSE UR QL STRIP: NORMAL
KETONES UR QL STRIP: NORMAL
LEUKOCYTE ESTERASE URINE, POC: NORMAL
NITRITE, POC UA: NORMAL
PH, POC UA: 7
PROTEIN, POC: NORMAL
SPECIFIC GRAVITY, POC UA: 1000
UROBILINOGEN, POC UA: NORMAL

## 2019-10-24 PROCEDURE — 99244 PR OFFICE CONSULTATION,LEVEL IV: ICD-10-PCS | Mod: 25,S$GLB,, | Performed by: UROLOGY

## 2019-10-24 PROCEDURE — 99999 PR PBB SHADOW E&M-EST. PATIENT-LVL III: ICD-10-PCS | Mod: PBBFAC,,, | Performed by: UROLOGY

## 2019-10-24 PROCEDURE — 99999 PR PBB SHADOW E&M-EST. PATIENT-LVL III: CPT | Mod: PBBFAC,,, | Performed by: UROLOGY

## 2019-10-24 PROCEDURE — 81001 POCT URINALYSIS, DIPSTICK OR TABLET REAGENT, AUTOMATED, WITH MICROSCOP: ICD-10-PCS | Mod: S$GLB,,, | Performed by: UROLOGY

## 2019-10-24 PROCEDURE — 99244 OFF/OP CNSLTJ NEW/EST MOD 40: CPT | Mod: 25,S$GLB,, | Performed by: UROLOGY

## 2019-10-24 PROCEDURE — 81001 URINALYSIS AUTO W/SCOPE: CPT | Mod: S$GLB,,, | Performed by: UROLOGY

## 2019-10-24 RX ORDER — CIPROFLOXACIN 500 MG/1
500 TABLET ORAL 2 TIMES DAILY
Qty: 6 TABLET | Refills: 0 | Status: SHIPPED | OUTPATIENT
Start: 2019-10-24 | End: 2019-10-27

## 2019-10-24 RX ORDER — ALFUZOSIN HYDROCHLORIDE 10 MG/1
10 TABLET, EXTENDED RELEASE ORAL DAILY
Qty: 30 TABLET | Refills: 11 | Status: SHIPPED | OUTPATIENT
Start: 2019-10-24 | End: 2020-11-16 | Stop reason: SDUPTHER

## 2019-10-24 NOTE — PROGRESS NOTES
Subjective:       Patient ID: Epifanio Guallpa is a 64 y.o. male who was referred by Leonel Wylie MD    Chief Complaint:   Chief Complaint   Patient presents with    Elevated PSA     new pt coming in with elevated psa        Elevated PSA  Patient is here with an elevated PSA. He has no personal history and no family history of prostate cancer.  He has a prior genitourinary history of urolithiasis.  Previous PSA values are :  Lab Results   Component Value Date    PSA 8.6 (H) 10/11/2019    PSA 0.7 08/22/2005     Benign Prostatic Hyperplasia  He patient reports nocturia two times a night and weak stream. He denies urgency. The patient states symptoms are of mild severity. Onset of symptoms was several years ago and was gradual in onset.  He has no personal history and no family history of prostate cancer. He reports a history of kidney stones. He denies flank pain, gross hematuria and recurrent UTI.  He is currently taking no prostate medications.  He took Flomax for a month without much help.    History of Kidney Stones  He has a history of stones.  He had a stent at one point.  He has had some left flank pain for the past month.  He denies fever or hematuria.     ACTIVE MEDICAL ISSUES:  Patient Active Problem List   Diagnosis    Right inguinal hernia    S/P right inguinal hernia repair    RBBB       PAST MEDICAL HISTORY  Past Medical History:   Diagnosis Date    Anxiety     Kidney stone     Kidney stone        PAST SURGICAL HISTORY:  Past Surgical History:   Procedure Laterality Date    HERNIA REPAIR      TONSILLECTOMY      WISDOM TOOTH EXTRACTION         SOCIAL HISTORY:  Social History     Tobacco Use    Smoking status: Never Smoker    Smokeless tobacco: Never Used   Substance Use Topics    Alcohol use: Yes     Alcohol/week: 1.0 standard drinks     Types: 1 Glasses of wine per week     Comment: occasionally- once a week    Drug use: No       FAMILY HISTORY:  Family History   Problem Relation  Age of Onset    Heart disease Father     Hypertension Father     Alzheimer's disease Mother     No Known Problems Sister     No Known Problems Brother     Heart disease Maternal Grandmother     Heart attack Maternal Grandmother     No Known Problems Maternal Grandfather     No Known Problems Paternal Grandmother     No Known Problems Paternal Grandfather        ALLERGIES AND MEDICATIONS: updated and reviewed.  Review of patient's allergies indicates:  No Known Allergies  Current Outpatient Medications   Medication Sig    aspirin (ECOTRIN) 81 MG EC tablet Take 81 mg by mouth once daily.    CALCIUM ORAL Take by mouth once daily.    garlic Cap Take by mouth once daily.     multivitamin (THERAGRAN) per tablet Take 1 tablet by mouth once daily.    alfuzosin (UROXATRAL) 10 mg Tb24 Take 1 tablet (10 mg total) by mouth once daily.    ciprofloxacin HCl (CIPRO) 500 MG tablet Take 1 tablet (500 mg total) by mouth 2 (two) times daily. for 6 doses     No current facility-administered medications for this visit.        Review of Systems   Constitutional: Negative for activity change, fatigue, fever and unexpected weight change.   HENT: Negative for congestion.    Eyes: Negative for redness.   Respiratory: Negative for chest tightness and shortness of breath.    Cardiovascular: Negative for chest pain and leg swelling.   Gastrointestinal: Negative for abdominal pain, constipation, diarrhea, nausea and vomiting.   Genitourinary: Negative for dysuria, flank pain, frequency, hematuria, penile pain, penile swelling, scrotal swelling, testicular pain and urgency.   Musculoskeletal: Negative for arthralgias and back pain.   Neurological: Negative for dizziness and light-headedness.   Psychiatric/Behavioral: Negative for behavioral problems and confusion. The patient is not nervous/anxious.    All other systems reviewed and are negative.      Objective:      Vitals:    10/24/19 0940   BP: (!) 140/92   BP Location: Left  arm   Patient Position: Sitting   BP Method: Large (Manual)   Weight: 77.8 kg (171 lb 8.3 oz)   Height: 6' (1.829 m)     Physical Exam   Nursing note and vitals reviewed.  Constitutional: He is oriented to person, place, and time. He appears well-developed and well-nourished.   HENT:   Head: Normocephalic.   Eyes: Conjunctivae are normal.   Neck: Normal range of motion. No tracheal deviation present. No thyromegaly present.   Cardiovascular: Normal rate and normal heart sounds.    Pulmonary/Chest: Effort normal and breath sounds normal. No respiratory distress. He has no wheezes.   Abdominal: Soft. He exhibits no distension and no mass. There is no hepatosplenomegaly. There is no tenderness. There is no rebound, no guarding and no CVA tenderness. No hernia. Hernia confirmed negative in the right inguinal area and confirmed negative in the left inguinal area.   Genitourinary: Rectum normal, testes normal and penis normal. Rectal exam shows no external hemorrhoid, no mass and no tenderness. Prostate is enlarged. Prostate is not tender. Right testis shows no mass and no tenderness. Left testis shows no mass and no tenderness.       Musculoskeletal: He exhibits no edema or tenderness.   Lymphadenopathy: No inguinal adenopathy noted on the right or left side.   Neurological: He is alert and oriented to person, place, and time.   Skin: Skin is warm and dry. No rash noted. No erythema.     Psychiatric: He has a normal mood and affect. His behavior is normal. Judgment and thought content normal.       Urine dipstick shows negative for all components.  Micro exam: negative for WBC's or RBC's.    Assessment:       1. Elevated PSA    2. BPH with obstruction/lower urinary tract symptoms    3. Nocturia more than twice per night    4. Kidney stones          Plan:       1. Elevated PSA    - ciprofloxacin HCl (CIPRO) 500 MG tablet; Take 1 tablet (500 mg total) by mouth 2 (two) times daily. for 6 doses  Dispense: 6 tablet; Refill:  0  - US Biopsy Prostate Singl Multi Specimens; Future    2. BPH with obstruction/lower urinary tract symptoms    - alfuzosin (UROXATRAL) 10 mg Tb24; Take 1 tablet (10 mg total) by mouth once daily.  Dispense: 30 tablet; Refill: 11    3. Nocturia more than twice per night    - POCT urinalysis, dipstick or tablet reag    4. Kidney stones    - US Retroperitoneal Complete (Kidney and; Future            Follow up for Prostate Biopsy, Review X-ray.

## 2019-10-24 NOTE — LETTER
October 24, 2019      Leonel Wylie MD  [address] [place] [address]           [facility]  [address]  [facility] [place] [address]  Phone: [phone]  Fax: [phone]          Patient: Epifanio Guallpa   MR Number: [id number]   YOB: 1955   Date of Visit: 10/24/2019       Dear Dr. Leonel Wylie:    Thank you for referring Epifanio Guallpa to me for evaluation. Attached you will find relevant portions of my assessment and plan of care.    If you have questions, please do not hesitate to call me. I look forward to following Epifanio Guallpa along with you.    Sincerely,    VA Sanchez MD    Enclosure  CC:  No Recipients    If you would like to receive this communication electronically, please contact [email or web address] or [phone] to request more information on [employer] Link access.    For providers and/or their staff who would like to refer a patient to [facility], please contact us through our one-stop-shop provider referral line, [facility], at [phone].    If you feel you have received this communication in error or would no longer like to receive these types of communications, please e-mail [email or web address]

## 2019-10-25 LAB — HEMOCCULT STL QL IA: NEGATIVE

## 2020-01-06 ENCOUNTER — PROCEDURE VISIT (OUTPATIENT)
Dept: UROLOGY | Facility: CLINIC | Age: 65
End: 2020-01-06
Attending: UROLOGY
Payer: OTHER GOVERNMENT

## 2020-01-06 DIAGNOSIS — R97.20 ELEVATED PSA: Primary | ICD-10-CM

## 2020-01-06 PROCEDURE — 76872 TRUS: ICD-10-PCS | Mod: S$GLB,,, | Performed by: UROLOGY

## 2020-01-06 PROCEDURE — 76872 US TRANSRECTAL: CPT | Mod: S$GLB,,, | Performed by: UROLOGY

## 2020-01-06 PROCEDURE — 88305 TISSUE EXAM BY PATHOLOGIST: CPT | Mod: 26,,, | Performed by: PATHOLOGY

## 2020-01-06 PROCEDURE — 55700 TRUS: ICD-10-PCS | Mod: S$GLB,,, | Performed by: UROLOGY

## 2020-01-06 PROCEDURE — 88305 TISSUE EXAM BY PATHOLOGIST: ICD-10-PCS | Mod: 26,,, | Performed by: PATHOLOGY

## 2020-01-06 PROCEDURE — 88305 TISSUE EXAM BY PATHOLOGIST: CPT | Performed by: PATHOLOGY

## 2020-01-06 PROCEDURE — 55700 TRUS: CPT | Mod: S$GLB,,, | Performed by: UROLOGY

## 2020-01-06 RX ORDER — GENTAMICIN SULFATE 40 MG/ML
80 INJECTION, SOLUTION INTRAMUSCULAR; INTRAVENOUS
Status: SHIPPED | OUTPATIENT
Start: 2020-01-06

## 2020-01-06 NOTE — PROCEDURES
"TRUS  Date/Time: 1/6/2020 1:00 PM  Performed by: VA Sanchez MD  Authorized by: VA Sanchez MD     Consent Done?:  Yes (Written)  Time out: Immediately prior to procedure a "time out" was called to verify the correct patient, procedure, equipment, support staff and site/side marked as required.    Indications: Elevated PSA    Preparation: Patient was prepped and draped in usual sterile fashion    Position:  Left lateral  Anesthesia:  10cc's 1% Lidocaine and Lidocaine jelly  Patient sedated: No    Prostate Size:  42 gm  Lesions:: No    Left Base Biopsies: 2  Left Mid Biopsies: 2  Left [place] Biopsies: 2  Right Base Biopsies: 2  Right Mid Biopsies: 2  Right [place] Biopsies: 2  Transitional zone: No    Total Biopsies:  12    Patient tolerance:  Patient tolerated the procedure well with no immediate complications      "

## 2020-01-15 LAB
FINAL PATHOLOGIC DIAGNOSIS: NORMAL
GROSS: NORMAL

## 2020-01-27 ENCOUNTER — OFFICE VISIT (OUTPATIENT)
Dept: UROLOGY | Facility: CLINIC | Age: 65
End: 2020-01-27
Payer: OTHER GOVERNMENT

## 2020-01-27 VITALS — HEIGHT: 72 IN | WEIGHT: 172.38 LBS | BODY MASS INDEX: 23.35 KG/M2

## 2020-01-27 DIAGNOSIS — C61 PROSTATE CANCER: Primary | ICD-10-CM

## 2020-01-27 DIAGNOSIS — R35.1 NOCTURIA MORE THAN TWICE PER NIGHT: ICD-10-CM

## 2020-01-27 DIAGNOSIS — N40.1 BPH WITH OBSTRUCTION/LOWER URINARY TRACT SYMPTOMS: ICD-10-CM

## 2020-01-27 DIAGNOSIS — N13.8 BPH WITH OBSTRUCTION/LOWER URINARY TRACT SYMPTOMS: ICD-10-CM

## 2020-01-27 PROCEDURE — 99214 OFFICE O/P EST MOD 30 MIN: CPT | Mod: S$GLB,,, | Performed by: UROLOGY

## 2020-01-27 PROCEDURE — 99999 PR PBB SHADOW E&M-EST. PATIENT-LVL III: ICD-10-PCS | Mod: PBBFAC,,, | Performed by: UROLOGY

## 2020-01-27 PROCEDURE — 99214 PR OFFICE/OUTPT VISIT, EST, LEVL IV, 30-39 MIN: ICD-10-PCS | Mod: S$GLB,,, | Performed by: UROLOGY

## 2020-01-27 PROCEDURE — 99999 PR PBB SHADOW E&M-EST. PATIENT-LVL III: CPT | Mod: PBBFAC,,, | Performed by: UROLOGY

## 2020-01-27 NOTE — PROGRESS NOTES
Subjective:       Patient ID: Epifanio Guallpa is a 64 y.o. male who was last seen in this office 1/6/2020    Chief Complaint:   Chief Complaint   Patient presents with    Post-op Evaluation     post op from trus/bx      Follow Up Prostate Biopsy    He underwent a prostate needle biopsy on 1/6/2020.  His biopsy was indicated due to: Elevated PSA.  Afterwards he experienced: Gross Hematuria and Blood in stool.  These symptoms have resolved.  His PSA prior to biopsy was 8.6.  His prostate size was 42 grams.  The ultrasound did not show a median lobe.  He currently does not have erectile dysfunction.  His pathology showed: prostate cancer.        ACTIVE MEDICAL ISSUES:  Patient Active Problem List   Diagnosis    Right inguinal hernia    S/P right inguinal hernia repair    RBBB       ALLERGIES AND MEDICATIONS: updated and reviewed.  Review of patient's allergies indicates:  No Known Allergies  Current Outpatient Medications   Medication Sig    aspirin (ECOTRIN) 81 MG EC tablet Take 81 mg by mouth once daily.    CALCIUM ORAL Take by mouth once daily.    garlic Cap Take by mouth once daily.     multivitamin (THERAGRAN) per tablet Take 1 tablet by mouth once daily.    alfuzosin (UROXATRAL) 10 mg Tb24 Take 1 tablet (10 mg total) by mouth once daily.     Current Facility-Administered Medications   Medication    gentamicin injection 80 mg       Review of Systems   Constitutional: Negative for activity change, fatigue, fever and unexpected weight change.   HENT: Negative for congestion.    Eyes: Negative for redness.   Respiratory: Negative for chest tightness and shortness of breath.    Cardiovascular: Negative for chest pain and leg swelling.   Gastrointestinal: Negative for abdominal pain, constipation, diarrhea, nausea and vomiting.   Genitourinary: Negative for dysuria, flank pain, frequency, hematuria, penile pain, penile swelling, scrotal swelling, testicular pain and urgency.   Musculoskeletal: Negative  for arthralgias and back pain.   Neurological: Negative for dizziness and light-headedness.   Psychiatric/Behavioral: Negative for behavioral problems and confusion. The patient is not nervous/anxious.    All other systems reviewed and are negative.      Objective:      Vitals:    01/27/20 1533   Weight: 78.2 kg (172 lb 6.4 oz)   Height: 6' (1.829 m)     Physical Exam   Nursing note and vitals reviewed.  Constitutional: He is oriented to person, place, and time. He appears well-developed and well-nourished.   HENT:   Head: Normocephalic.   Eyes: Conjunctivae are normal.   Neck: Normal range of motion. Neck supple. No tracheal deviation present. No thyromegaly present.   Cardiovascular: Normal rate and normal heart sounds.    Pulmonary/Chest: Effort normal and breath sounds normal. No respiratory distress. He has no wheezes.   Abdominal: Soft. Bowel sounds are normal. There is no hepatosplenomegaly. There is no tenderness. There is no rebound and no CVA tenderness. No hernia.   Musculoskeletal: Normal range of motion. He exhibits no edema or tenderness.   Lymphadenopathy:     He has no cervical adenopathy.   Neurological: He is alert and oriented to person, place, and time.   Skin: Skin is warm and dry. No rash noted. No erythema.     Psychiatric: He has a normal mood and affect. His behavior is normal. Judgment and thought content normal.           1. PROSTATE, LEFT BASE LATERAL, BIOPSY:  - Prostatic adenocarcinoma, [place] score 3+3=6 (Grade group 1) involving 5% of 1 core.  - Total linear length of carcinoma is <1 mm.  2. PROSTATE, LEFT BASE MEDIAL, BIOPSY:  - Prostatic adenocarcinoma, [place] score 3+3=6 (Grade group 1) discontinuously involving 30% of 1 core.  - Total linear length of carcinoma is 1 mm.  3. PROSTATE, LEFT MID LATERAL, BIOPSY:  - Prostatic adenocarcinoma, [place]ef[place] score 3+3=6 (Grade group 1) discontinuously involving 70% of 1 core.  - Total linear length of carcinoma is 1 mm.  4. PROSTATE, LEFT  MID MEDIAL, BIOPSY:  - Prostatic adenocarcinoma, [place] score 3+3=6 (Grade group 1) involving 15% of 1 core.  - Total linear length of carcinoma is 1 mm.  5. PROSTATE, LEFT APEX LATERAL, BIOPSY:  - Benign prostate tissue.  6. PROSTATE, LEFT APEX MEDIAL, BIOPSY:  - Prostate tissue with small focus of atypical glands, suspicious for carcinoma. See note.  Note: Although these findings are atypical and suspicious for adenocarcinoma, there is insufficient  cytologic/architectural atypia to establish a definitive diagnosis.  7. PROSTATE, RIGHT BASE LATERAL, BIOPSY:  - Benign prostate tissue.  8. PROSTATE, RIGHT BASE MEDIAL, BIOPSY:  - Benign prostate tissue.  9. PROSTATE, RIGHT MID LATERAL, BIOPSY:  - Benign prostate tissue.  10. PROSTATE, RIGHT MID MEDIAL, BIOPSY:  - Benign prostate tissue.  11. PROSTATE, RIGHT APEX LATERAL, BIOPSY:  - Benign prostate tissue.  12. PROSTATE, RIGHT APEX MEDIAL, BIOPSY:  - Benign prostate tissue with acute inflammation.    Assessment:       1. Prostate cancer    2. 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 Prostate cancer  We dicussed options: active surveillance, radiation, surgery, and ADT.  ADT is not really needed.  He will review the packet.  He will return in 2 weeks to decide    2. 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

## 2020-02-18 ENCOUNTER — OFFICE VISIT (OUTPATIENT)
Dept: UROLOGY | Facility: CLINIC | Age: 65
End: 2020-02-18
Payer: OTHER GOVERNMENT

## 2020-02-18 VITALS
SYSTOLIC BLOOD PRESSURE: 150 MMHG | WEIGHT: 172 LBS | BODY MASS INDEX: 23.3 KG/M2 | DIASTOLIC BLOOD PRESSURE: 90 MMHG | HEIGHT: 72 IN

## 2020-02-18 DIAGNOSIS — R33.9 INCOMPLETE EMPTYING OF BLADDER: ICD-10-CM

## 2020-02-18 DIAGNOSIS — R35.1 NOCTURIA MORE THAN TWICE PER NIGHT: ICD-10-CM

## 2020-02-18 DIAGNOSIS — C61 PROSTATE CANCER: Primary | ICD-10-CM

## 2020-02-18 PROCEDURE — 99999 PR PBB SHADOW E&M-EST. PATIENT-LVL III: CPT | Mod: PBBFAC,,, | Performed by: UROLOGY

## 2020-02-18 PROCEDURE — 99214 PR OFFICE/OUTPT VISIT, EST, LEVL IV, 30-39 MIN: ICD-10-PCS | Mod: S$GLB,,, | Performed by: UROLOGY

## 2020-02-18 PROCEDURE — 99214 OFFICE O/P EST MOD 30 MIN: CPT | Mod: S$GLB,,, | Performed by: UROLOGY

## 2020-02-18 PROCEDURE — 99999 PR PBB SHADOW E&M-EST. PATIENT-LVL III: ICD-10-PCS | Mod: PBBFAC,,, | Performed by: UROLOGY

## 2020-02-18 NOTE — PROGRESS NOTES
Subjective:       Patient ID: Epifanio Guallpa is a 65 y.o. male who was last seen in this office 1/27/2020    Chief Complaint:   Chief Complaint   Patient presents with    Prostate Cancer     pt here today to discuss treatment     Prostate Cancer   He underwent a prostate needle biopsy on 1/6/2020.  His biopsy was indicated due to: Elevated PSA.  Afterwards he experienced: Gross Hematuria and Blood in stool.  These symptoms have resolved.  His PSA prior to biopsy was 8.6.  His prostate size was 42 grams.  The ultrasound did not show a median lobe.  He currently does not have erectile dysfunction.  His pathology showed: prostate cancer.          ACTIVE MEDICAL ISSUES:  Patient Active Problem List   Diagnosis    Right inguinal hernia    S/P right inguinal hernia repair    RBBB       ALLERGIES AND MEDICATIONS: updated and reviewed.  Review of patient's allergies indicates:  No Known Allergies  Current Outpatient Medications   Medication Sig    aspirin (ECOTRIN) 81 MG EC tablet Take 81 mg by mouth once daily.    CALCIUM ORAL Take by mouth once daily.    garlic Cap Take by mouth once daily.     multivitamin (THERAGRAN) per tablet Take 1 tablet by mouth once daily.    alfuzosin (UROXATRAL) 10 mg Tb24 Take 1 tablet (10 mg total) by mouth once daily.     Current Facility-Administered Medications   Medication    gentamicin injection 80 mg       Review of Systems   Constitutional: Negative for activity change, fatigue, fever and unexpected weight change.   HENT: Negative for congestion.    Eyes: Negative for redness.   Respiratory: Negative for chest tightness and shortness of breath.    Cardiovascular: Negative for chest pain and leg swelling.   Gastrointestinal: Negative for abdominal pain, constipation, diarrhea, nausea and vomiting.   Genitourinary: Negative for dysuria, flank pain, frequency, hematuria, penile pain, penile swelling, scrotal swelling, testicular pain and urgency.   Musculoskeletal: Negative  for arthralgias and back pain.   Neurological: Negative for dizziness and light-headedness.   Psychiatric/Behavioral: Negative for behavioral problems and confusion. The patient is not nervous/anxious.    All other systems reviewed and are negative.      Objective:      Vitals:    02/18/20 1608   BP: (!) 150/90   BP Location: Right arm   Patient Position: Sitting   Weight: 78 kg (172 lb)   Height: 6' (1.829 m)     Physical Exam   Nursing note and vitals reviewed.  Constitutional: He is oriented to person, place, and time. He appears well-developed and well-nourished.   HENT:   Head: Normocephalic.   Eyes: Conjunctivae are normal.   Neck: Normal range of motion. Neck supple. No tracheal deviation present. No thyromegaly present.   Cardiovascular: Normal rate and normal heart sounds.    Pulmonary/Chest: Effort normal and breath sounds normal. No respiratory distress. He has no wheezes.   Abdominal: Soft. Bowel sounds are normal. There is no hepatosplenomegaly. There is no tenderness. There is no rebound and no CVA tenderness. No hernia.   Musculoskeletal: Normal range of motion. He exhibits no edema or tenderness.   Lymphadenopathy:     He has no cervical adenopathy.   Neurological: He is alert and oriented to person, place, and time.   Skin: Skin is warm and dry. No rash noted. No erythema.     Psychiatric: He has a normal mood and affect. His behavior is normal. Judgment and thought content normal.       Urine dipstick shows negative for all components.  Micro exam: negative for WBC's or RBC's.    1. PROSTATE, LEFT BASE LATERAL, BIOPSY:  - Prostatic adenocarcinoma, [place] score 3+3=6 (Grade group 1) involving 5% of 1 core.  - Total linear length of carcinoma is <1 mm.  2. PROSTATE, LEFT BASE MEDIAL, BIOPSY:  - Prostatic adenocarcinoma, Yefri score 3+3=6 (Grade group 1) discontinuously involving 30% of 1 core.  - Total linear length of carcinoma is 1 mm.  3. PROSTATE, LEFT MID LATERAL, BIOPSY:  - Prostatic  adenocarcinoma, Yefri score 3+3=6 (Grade group 1) discontinuously involving 70% of 1 core.  - Total linear length of carcinoma is 1 mm.  4. PROSTATE, LEFT MID MEDIAL, BIOPSY:  - Prostatic adenocarcinoma, [place] score 3+3=6 (Grade group 1) involving 15% of 1 core.  - Total linear length of carcinoma is 1 mm.  5. PROSTATE, LEFT APEX LATERAL, BIOPSY:  - Benign prostate tissue.  6. PROSTATE, LEFT APEX MEDIAL, BIOPSY:  - Prostate tissue with small focus of atypical glands, suspicious for carcinoma. See note.  Note: Although these findings are atypical and suspicious for adenocarcinoma, there is insufficient  cytologic/architectural atypia to establish a definitive diagnosis.  7. PROSTATE, RIGHT BASE LATERAL, BIOPSY:  - Benign prostate tissue.  8. PROSTATE, RIGHT BASE MEDIAL, BIOPSY:  - Benign prostate tissue.  9. PROSTATE, RIGHT MID LATERAL, BIOPSY:  - Benign prostate tissue.  10. PROSTATE, RIGHT MID MEDIAL, BIOPSY:  - Benign prostate tissue.  11. PROSTATE, RIGHT APEX LATERAL, BIOPSY:  - Benign prostate tissue.  12. PROSTATE, RIGHT APEX MEDIAL, BIOPSY:  - Benign prostate tissue with acute inflammation.      Assessment:       1. Prostate cancer    2. Nocturia more than twice per night    3. Incomplete emptying of bladder          Plan:       1. Prostate cancer  Patient wants to look into radiation as an option.  He wants to stay on the [facility].  He will come back afterwards and will likely need markers and Space OAR  - Ambulatory referral/consult to Radiation Oncology; Future    2. Nocturia more than twice per night  Limit evening fluids    3. Incomplete emptying of bladder  stable            Follow up in about 4 weeks (around 3/17/2020) for Follow up.

## 2020-02-24 ENCOUNTER — TELEPHONE (OUTPATIENT)
Dept: UROLOGY | Facility: CLINIC | Age: 65
End: 2020-02-24

## 2020-02-24 NOTE — TELEPHONE ENCOUNTER
----- Message from Jocelyn Ordoñez sent at 2/24/2020 10:33 AM CST -----  Contact: Self: [phone] or [phone]  Type: Patient Call Back    Who called:Self    What is the request in detail:Called regarding other appt's    Can the clinic reply by [facility]? No    Would the patient rather a call back or a response via [facility]? Callback    Best call back number:[phone]

## 2020-02-26 ENCOUNTER — TELEPHONE (OUTPATIENT)
Dept: UROLOGY | Facility: CLINIC | Age: 65
End: 2020-02-26

## 2020-02-26 NOTE — TELEPHONE ENCOUNTER
----- Message from Agathati Clarke sent at 2/26/2020  9:42 AM CST -----  Contact: Hema Cohen's office   Type: Patient Call Back    Who called: Montse   What is the request in detail: Asking for last PSA and US. Also office notes prior to 2/12/2020 Please advise     Can the clinic reply by [facility]?Call     Would the patient rather a call back or a response via [facility]?  [facility] call back number: [phone] Fax: [phone]    Additional Information:

## 2020-02-27 ENCOUNTER — LAB VISIT (OUTPATIENT)
Dept: LAB | Facility: HOSPITAL | Age: 65
End: 2020-02-27
Attending: FAMILY MEDICINE
Payer: MEDICARE

## 2020-02-27 DIAGNOSIS — C61 MALIGNANT NEOPLASM OF PROSTATE: Primary | ICD-10-CM

## 2020-02-27 LAB — COMPLEXED PSA SERPL-MCNC: 7.2 NG/ML (ref 0–4)

## 2020-02-27 PROCEDURE — 36415 COLL VENOUS BLD VENIPUNCTURE: CPT | Mod: PO

## 2020-02-27 PROCEDURE — 84153 ASSAY OF PSA TOTAL: CPT

## 2020-03-05 ENCOUNTER — TELEPHONE (OUTPATIENT)
Dept: UROLOGY | Facility: CLINIC | Age: 65
End: 2020-03-05

## 2020-03-05 NOTE — TELEPHONE ENCOUNTER
----- Message from Bradly Marie sent at 3/5/2020  1:13 PM CST -----  Contact: Epifanio [phone]  Type: Patient Call Back    Who called:Epifanio    What is the request in detail: The patient is calling to notify the staff that Dr. Acevedo's office does not have the last lab results of the patient, that he took in February. The patient would like them resent as soon as possible.    Can the clinic reply by [facility]?no    Would the patient rather a call back or a response via [facility]? Call back     Best call back number:[phone]

## 2020-03-12 ENCOUNTER — TELEPHONE (OUTPATIENT)
Dept: UROLOGY | Facility: CLINIC | Age: 65
End: 2020-03-12

## 2020-03-12 NOTE — TELEPHONE ENCOUNTER
"----- Message from Lisbeth Tobias sent at 3/12/2020 10:34 AM CDT -----  Contact: Bridgette Acevedo's Office" [phone]  Type: Patient Call Back    Who called:Bridgette Acevedo's office"    What is the request in detail: Calling in regards to patient    Can the clinic reply by [facility]? Call back    Would the patient rather a call back or a response via [facility]?     Best call back number:[phone]        "

## 2020-07-23 ENCOUNTER — TELEPHONE (OUTPATIENT)
Dept: UROLOGY | Facility: CLINIC | Age: 65
End: 2020-07-23

## 2020-07-23 NOTE — TELEPHONE ENCOUNTER
Returned phone call to Naomi from Dr. Acevedo's office. She wanted to know when was the pt's last PSA done. Gave her the date - Rafaela

## 2020-11-13 DIAGNOSIS — Z12.11 COLON CANCER SCREENING: ICD-10-CM

## 2020-11-16 DIAGNOSIS — N40.1 BPH WITH OBSTRUCTION/LOWER URINARY TRACT SYMPTOMS: ICD-10-CM

## 2020-11-16 DIAGNOSIS — N13.8 BPH WITH OBSTRUCTION/LOWER URINARY TRACT SYMPTOMS: ICD-10-CM

## 2020-11-16 RX ORDER — ALFUZOSIN HYDROCHLORIDE 10 MG/1
10 TABLET, EXTENDED RELEASE ORAL DAILY
Qty: 30 TABLET | Refills: 11 | Status: SHIPPED | OUTPATIENT
Start: 2020-11-16 | End: 2020-12-16

## 2021-03-20 ENCOUNTER — PATIENT OUTREACH (OUTPATIENT)
Dept: ADMINISTRATIVE | Facility: OTHER | Age: 66
End: 2021-03-20

## 2021-03-26 ENCOUNTER — OFFICE VISIT (OUTPATIENT)
Dept: UROLOGY | Facility: CLINIC | Age: 66
End: 2021-03-26
Payer: MEDICARE

## 2021-03-26 VITALS — HEIGHT: 72 IN | WEIGHT: 170 LBS | BODY MASS INDEX: 23.03 KG/M2

## 2021-03-26 DIAGNOSIS — C61 PROSTATE CANCER: Primary | ICD-10-CM

## 2021-03-26 DIAGNOSIS — R35.1 NOCTURIA MORE THAN TWICE PER NIGHT: ICD-10-CM

## 2021-03-26 DIAGNOSIS — R33.9 INCOMPLETE EMPTYING OF BLADDER: ICD-10-CM

## 2021-03-26 PROCEDURE — 99999 PR PBB SHADOW E&M-EST. PATIENT-LVL III: ICD-10-PCS | Mod: PBBFAC,,, | Performed by: UROLOGY

## 2021-03-26 PROCEDURE — 99999 PR PBB SHADOW E&M-EST. PATIENT-LVL III: CPT | Mod: PBBFAC,,, | Performed by: UROLOGY

## 2021-03-26 PROCEDURE — 99213 OFFICE O/P EST LOW 20 MIN: CPT | Mod: PBBFAC | Performed by: UROLOGY

## 2021-03-26 PROCEDURE — 99214 OFFICE O/P EST MOD 30 MIN: CPT | Mod: S$PBB,,, | Performed by: UROLOGY

## 2021-03-26 PROCEDURE — 99214 PR OFFICE/OUTPT VISIT, EST, LEVL IV, 30-39 MIN: ICD-10-PCS | Mod: S$PBB,,, | Performed by: UROLOGY

## 2021-03-26 RX ORDER — CIPROFLOXACIN 500 MG/1
500 TABLET ORAL 2 TIMES DAILY
Qty: 6 TABLET | Refills: 0 | Status: SHIPPED | OUTPATIENT
Start: 2021-03-26 | End: 2021-03-29

## 2021-05-06 ENCOUNTER — PROCEDURE VISIT (OUTPATIENT)
Dept: UROLOGY | Facility: CLINIC | Age: 66
End: 2021-05-06
Attending: UROLOGY
Payer: MEDICARE

## 2021-05-06 DIAGNOSIS — C61 PROSTATE CANCER: Primary | ICD-10-CM

## 2021-05-06 PROCEDURE — 55700 TRUS: ICD-10-PCS | Mod: S$PBB,,, | Performed by: UROLOGY

## 2021-05-06 PROCEDURE — 96372 THER/PROPH/DIAG INJ SC/IM: CPT | Mod: PBBFAC

## 2021-05-06 PROCEDURE — 88305 TISSUE EXAM BY PATHOLOGIST: ICD-10-PCS | Mod: 26,,, | Performed by: PATHOLOGY

## 2021-05-06 PROCEDURE — 55700 TRUS: CPT | Mod: PBBFAC | Performed by: UROLOGY

## 2021-05-06 PROCEDURE — 88341 IMHCHEM/IMCYTCHM EA ADD ANTB: CPT | Performed by: PATHOLOGY

## 2021-05-06 PROCEDURE — 88305 TISSUE EXAM BY PATHOLOGIST: CPT | Mod: 26,,, | Performed by: PATHOLOGY

## 2021-05-06 PROCEDURE — 88342 CHG IMMUNOCYTOCHEMISTRY: ICD-10-PCS | Mod: 26,,, | Performed by: PATHOLOGY

## 2021-05-06 PROCEDURE — 76872 TRUS: ICD-10-PCS | Mod: 26,S$PBB,, | Performed by: UROLOGY

## 2021-05-06 PROCEDURE — 76872 US TRANSRECTAL: CPT | Mod: 26,S$PBB,, | Performed by: UROLOGY

## 2021-05-06 PROCEDURE — 88305 TISSUE EXAM BY PATHOLOGIST: CPT | Mod: 59 | Performed by: PATHOLOGY

## 2021-05-06 PROCEDURE — 88342 IMHCHEM/IMCYTCHM 1ST ANTB: CPT | Performed by: PATHOLOGY

## 2021-05-06 PROCEDURE — 88341 PR IHC OR ICC EACH ADD'L SINGLE ANTIBODY  STAINPR: ICD-10-PCS | Mod: 26,,, | Performed by: PATHOLOGY

## 2021-05-06 PROCEDURE — 88342 IMHCHEM/IMCYTCHM 1ST ANTB: CPT | Mod: 26,,, | Performed by: PATHOLOGY

## 2021-05-06 PROCEDURE — 88341 IMHCHEM/IMCYTCHM EA ADD ANTB: CPT | Mod: 26,,, | Performed by: PATHOLOGY

## 2021-05-06 RX ORDER — GENTAMICIN SULFATE 40 MG/ML
80 INJECTION, SOLUTION INTRAMUSCULAR; INTRAVENOUS
Status: COMPLETED | OUTPATIENT
Start: 2021-05-06 | End: 2021-05-06

## 2021-05-06 RX ADMIN — GENTAMICIN SULFATE 80 MG: 40 INJECTION, SOLUTION INTRAMUSCULAR; INTRAVENOUS at 10:05

## 2021-05-19 ENCOUNTER — TELEPHONE (OUTPATIENT)
Dept: UROLOGY | Facility: CLINIC | Age: 66
End: 2021-05-19

## 2021-05-24 ENCOUNTER — TELEPHONE (OUTPATIENT)
Dept: UROLOGY | Facility: CLINIC | Age: 66
End: 2021-05-24

## 2021-05-24 LAB
COMMENT: NORMAL
FINAL PATHOLOGIC DIAGNOSIS: NORMAL
GROSS: NORMAL
Lab: NORMAL

## 2021-06-09 ENCOUNTER — OFFICE VISIT (OUTPATIENT)
Dept: UROLOGY | Facility: CLINIC | Age: 66
End: 2021-06-09
Payer: MEDICARE

## 2021-06-09 VITALS — BODY MASS INDEX: 23.03 KG/M2 | WEIGHT: 170 LBS | HEIGHT: 72 IN

## 2021-06-09 DIAGNOSIS — R33.9 INCOMPLETE EMPTYING OF BLADDER: ICD-10-CM

## 2021-06-09 DIAGNOSIS — C61 PROSTATE CANCER: Primary | ICD-10-CM

## 2021-06-09 DIAGNOSIS — R35.1 NOCTURIA MORE THAN TWICE PER NIGHT: ICD-10-CM

## 2021-06-09 LAB
BILIRUB SERPL-MCNC: NORMAL MG/DL
BLOOD URINE, POC: NORMAL
COLOR, POC UA: YELLOW
GLUCOSE UR QL STRIP: NORMAL
KETONES UR QL STRIP: NORMAL
LEUKOCYTE ESTERASE URINE, POC: NORMAL
NITRITE, POC UA: NORMAL
PH, POC UA: 6
PROTEIN, POC: NORMAL
SPECIFIC GRAVITY, POC UA: 1000
UROBILINOGEN, POC UA: NORMAL

## 2021-06-09 PROCEDURE — 99213 OFFICE O/P EST LOW 20 MIN: CPT | Mod: PBBFAC | Performed by: UROLOGY

## 2021-06-09 PROCEDURE — 99999 PR PBB SHADOW E&M-EST. PATIENT-LVL III: ICD-10-PCS | Mod: PBBFAC,,, | Performed by: UROLOGY

## 2021-06-09 PROCEDURE — 99999 PR PBB SHADOW E&M-EST. PATIENT-LVL III: CPT | Mod: PBBFAC,,, | Performed by: UROLOGY

## 2021-06-09 PROCEDURE — 99213 PR OFFICE/OUTPT VISIT, EST, LEVL III, 20-29 MIN: ICD-10-PCS | Mod: S$PBB,,, | Performed by: UROLOGY

## 2021-06-09 PROCEDURE — 99213 OFFICE O/P EST LOW 20 MIN: CPT | Mod: S$PBB,,, | Performed by: UROLOGY

## 2021-06-09 PROCEDURE — 81001 URINALYSIS AUTO W/SCOPE: CPT | Mod: PBBFAC | Performed by: UROLOGY

## 2021-06-09 RX ORDER — ALFUZOSIN HYDROCHLORIDE 10 MG/1
10 TABLET, EXTENDED RELEASE ORAL
COMMUNITY
End: 2021-10-25 | Stop reason: SDUPTHER

## 2021-09-27 ENCOUNTER — LAB VISIT (OUTPATIENT)
Dept: LAB | Facility: HOSPITAL | Age: 66
End: 2021-09-27
Attending: UROLOGY
Payer: MEDICARE

## 2021-09-27 ENCOUNTER — TELEPHONE (OUTPATIENT)
Dept: UROLOGY | Facility: CLINIC | Age: 66
End: 2021-09-27

## 2021-09-27 DIAGNOSIS — C61 PROSTATE CANCER: ICD-10-CM

## 2021-09-27 LAB — COMPLEXED PSA SERPL-MCNC: 12.4 NG/ML (ref 0–4)

## 2021-09-27 PROCEDURE — 84153 ASSAY OF PSA TOTAL: CPT | Performed by: UROLOGY

## 2021-09-27 PROCEDURE — 36415 COLL VENOUS BLD VENIPUNCTURE: CPT | Performed by: UROLOGY

## 2021-10-25 ENCOUNTER — OFFICE VISIT (OUTPATIENT)
Dept: UROLOGY | Facility: CLINIC | Age: 66
End: 2021-10-25
Payer: MEDICARE

## 2021-10-25 VITALS — BODY MASS INDEX: 22.48 KG/M2 | HEIGHT: 72 IN | WEIGHT: 166 LBS | RESPIRATION RATE: 18 BRPM

## 2021-10-25 DIAGNOSIS — R33.9 INCOMPLETE EMPTYING OF BLADDER: ICD-10-CM

## 2021-10-25 DIAGNOSIS — R35.1 NOCTURIA MORE THAN TWICE PER NIGHT: ICD-10-CM

## 2021-10-25 DIAGNOSIS — C61 PROSTATE CANCER: Primary | ICD-10-CM

## 2021-10-25 PROCEDURE — 99214 PR OFFICE/OUTPT VISIT, EST, LEVL IV, 30-39 MIN: ICD-10-PCS | Mod: S$PBB,,, | Performed by: UROLOGY

## 2021-10-25 PROCEDURE — 81001 URINALYSIS AUTO W/SCOPE: CPT | Mod: PBBFAC | Performed by: UROLOGY

## 2021-10-25 PROCEDURE — 99999 PR PBB SHADOW E&M-EST. PATIENT-LVL III: CPT | Mod: PBBFAC,,, | Performed by: UROLOGY

## 2021-10-25 PROCEDURE — 99213 OFFICE O/P EST LOW 20 MIN: CPT | Mod: PBBFAC | Performed by: UROLOGY

## 2021-10-25 PROCEDURE — 99999 PR PBB SHADOW E&M-EST. PATIENT-LVL III: ICD-10-PCS | Mod: PBBFAC,,, | Performed by: UROLOGY

## 2021-10-25 PROCEDURE — 99214 OFFICE O/P EST MOD 30 MIN: CPT | Mod: S$PBB,,, | Performed by: UROLOGY

## 2021-10-25 RX ORDER — OMEGA-3 FATTY ACIDS 1000 MG
1 CAPSULE ORAL
COMMUNITY

## 2021-10-25 RX ORDER — ALFUZOSIN HYDROCHLORIDE 10 MG/1
10 TABLET, EXTENDED RELEASE ORAL
Qty: 30 TABLET | Refills: 11 | Status: SHIPPED | OUTPATIENT
Start: 2021-10-25 | End: 2022-10-06

## 2021-10-27 LAB
BILIRUB SERPL-MCNC: NORMAL MG/DL
BLOOD URINE, POC: NORMAL
COLOR, POC UA: YELLOW
GLUCOSE UR QL STRIP: NORMAL
KETONES UR QL STRIP: NORMAL
LEUKOCYTE ESTERASE URINE, POC: NORMAL
NITRITE, POC UA: NORMAL
PH, POC UA: 8
PROTEIN, POC: NORMAL
SPECIFIC GRAVITY, POC UA: 1000
UROBILINOGEN, POC UA: NORMAL

## 2021-12-20 ENCOUNTER — OFFICE VISIT (OUTPATIENT)
Dept: FAMILY MEDICINE | Facility: CLINIC | Age: 66
End: 2021-12-20
Payer: MEDICARE

## 2021-12-20 VITALS
SYSTOLIC BLOOD PRESSURE: 138 MMHG | BODY MASS INDEX: 22.54 KG/M2 | RESPIRATION RATE: 17 BRPM | HEART RATE: 84 BPM | HEIGHT: 72 IN | WEIGHT: 166.44 LBS | TEMPERATURE: 99 F | OXYGEN SATURATION: 98 % | DIASTOLIC BLOOD PRESSURE: 84 MMHG

## 2021-12-20 DIAGNOSIS — Z23 NEEDS FLU SHOT: ICD-10-CM

## 2021-12-20 DIAGNOSIS — R42 DIZZINESS: Primary | ICD-10-CM

## 2021-12-20 PROCEDURE — 99214 OFFICE O/P EST MOD 30 MIN: CPT | Mod: PBBFAC,PO,25 | Performed by: FAMILY MEDICINE

## 2021-12-20 PROCEDURE — 90694 VACC AIIV4 NO PRSRV 0.5ML IM: CPT | Mod: PBBFAC,PO

## 2021-12-20 PROCEDURE — 99999 PR PBB SHADOW E&M-EST. PATIENT-LVL IV: ICD-10-PCS | Mod: PBBFAC,,, | Performed by: FAMILY MEDICINE

## 2021-12-20 PROCEDURE — 99213 OFFICE O/P EST LOW 20 MIN: CPT | Mod: S$PBB,,, | Performed by: FAMILY MEDICINE

## 2021-12-20 PROCEDURE — 99999 PR PBB SHADOW E&M-EST. PATIENT-LVL IV: CPT | Mod: PBBFAC,,, | Performed by: FAMILY MEDICINE

## 2021-12-20 PROCEDURE — 99213 PR OFFICE/OUTPT VISIT, EST, LEVL III, 20-29 MIN: ICD-10-PCS | Mod: S$PBB,,, | Performed by: FAMILY MEDICINE

## 2021-12-20 PROCEDURE — G0008 ADMIN INFLUENZA VIRUS VAC: HCPCS | Mod: PBBFAC,PO

## 2022-01-18 ENCOUNTER — LAB VISIT (OUTPATIENT)
Dept: LAB | Facility: HOSPITAL | Age: 67
End: 2022-01-18
Attending: UROLOGY
Payer: MEDICARE

## 2022-01-18 DIAGNOSIS — C61 PROSTATE CANCER: ICD-10-CM

## 2022-01-18 LAB — COMPLEXED PSA SERPL-MCNC: 12.4 NG/ML (ref 0–4)

## 2022-01-18 PROCEDURE — 36415 COLL VENOUS BLD VENIPUNCTURE: CPT | Performed by: UROLOGY

## 2022-01-18 PROCEDURE — 84153 ASSAY OF PSA TOTAL: CPT | Performed by: UROLOGY

## 2022-01-25 ENCOUNTER — OFFICE VISIT (OUTPATIENT)
Dept: UROLOGY | Facility: CLINIC | Age: 67
End: 2022-01-25
Payer: MEDICARE

## 2022-01-25 ENCOUNTER — TELEPHONE (OUTPATIENT)
Dept: UROLOGY | Facility: CLINIC | Age: 67
End: 2022-01-25

## 2022-01-25 VITALS — BODY MASS INDEX: 22.19 KG/M2 | WEIGHT: 163.81 LBS | HEIGHT: 72 IN | RESPIRATION RATE: 18 BRPM

## 2022-01-25 DIAGNOSIS — R33.9 INCOMPLETE EMPTYING OF BLADDER: ICD-10-CM

## 2022-01-25 DIAGNOSIS — R35.1 NOCTURIA MORE THAN TWICE PER NIGHT: ICD-10-CM

## 2022-01-25 DIAGNOSIS — C61 PROSTATE CANCER: Primary | ICD-10-CM

## 2022-01-25 PROCEDURE — 99214 OFFICE O/P EST MOD 30 MIN: CPT | Mod: S$PBB,,, | Performed by: UROLOGY

## 2022-01-25 PROCEDURE — 99213 OFFICE O/P EST LOW 20 MIN: CPT | Mod: PBBFAC | Performed by: UROLOGY

## 2022-01-25 PROCEDURE — 99999 PR PBB SHADOW E&M-EST. PATIENT-LVL III: CPT | Mod: PBBFAC,,, | Performed by: UROLOGY

## 2022-01-25 PROCEDURE — 99999 PR PBB SHADOW E&M-EST. PATIENT-LVL III: ICD-10-PCS | Mod: PBBFAC,,, | Performed by: UROLOGY

## 2022-01-25 PROCEDURE — 99214 PR OFFICE/OUTPT VISIT, EST, LEVL IV, 30-39 MIN: ICD-10-PCS | Mod: S$PBB,,, | Performed by: UROLOGY

## 2022-01-25 NOTE — PROGRESS NOTES
Subjective:       Patient ID: Epifanio Guallpa is a 66 y.o. male The patient's last visit with me was on 10/25/2021.     Chief Complaint:   Chief Complaint   Patient presents with    Follow-up     Pt here for f/u and PSA results, he has no new concerns       Prostate Cancer   He underwent a prostate needle biopsy on 1/6/2020.  His biopsy was indicated due to: Elevated PSA.  Afterwards he experienced: Gross Hematuria and Blood in stool.  These symptoms have resolved.  His PSA prior to biopsy was 8.6.  His prostate size was 42 grams.  The ultrasound did not show a median lobe.  He currently does not have erectile dysfunction.  His pathology showed: prostate cancer.    Original Biopsy: 3+3=6 in 4/12 cores, Left Base/Mid    He met with Dr. Acevedo and agreed to active surveillance.  He has been doing active surveillance with Dr. Acevedo.  His last PSA was 8.0 on 1/22/2021.    He underwent a prostate needle biopsy on 5/6/2021.  His biopsy was indicated due to: Prostate Cancer.  Afterwards he experienced: Gross Hematuria and Blood in stool.  These symptoms have resolved.  His PSA prior to biopsy was 7.2.  His prostate size was 26 grams.  The ultrasound did not show a median lobe.  He currently does not have erectile dysfunction.  His pathology showed: Prostate Cancer.  Pathology: 3+4=7, 1 core 20%. 3+3=6, 15%            ACTIVE MEDICAL ISSUES:  Patient Active Problem List   Diagnosis    Right inguinal hernia    S/P right inguinal hernia repair    RBBB       ALLERGIES AND MEDICATIONS: updated and reviewed.  Review of patient's allergies indicates:  No Known Allergies  Current Outpatient Medications   Medication Sig    alfuzosin (UROXATRAL) 10 mg Tb24 Take 1 tablet (10 mg total) by mouth daily with breakfast.    aspirin (ECOTRIN) 81 MG EC tablet Take 81 mg by mouth once daily.    CALCIUM ORAL Take by mouth once daily.    garlic Cap Take by mouth once daily.     multivitamin (THERAGRAN) per tablet Take 1 tablet by  mouth once daily.    omega-3 fatty acids 1,000 mg Cap Take 1 g by mouth.     Current Facility-Administered Medications   Medication    gentamicin injection 80 mg       Review of Systems   Constitutional: Negative for activity change, fatigue, fever and unexpected weight change.   HENT: Negative for congestion.    Eyes: Negative for redness.   Respiratory: Negative for chest tightness and shortness of breath.    Cardiovascular: Negative for chest pain and leg swelling.   Gastrointestinal: Negative for abdominal pain, constipation, diarrhea, nausea and vomiting.   Genitourinary: Negative for dysuria, flank pain, frequency, hematuria, penile pain, penile swelling, scrotal swelling, testicular pain and urgency.   Musculoskeletal: Negative for arthralgias and back pain.   Neurological: Negative for dizziness and light-headedness.   Psychiatric/Behavioral: Negative for behavioral problems and confusion. The patient is not nervous/anxious.    All other systems reviewed and are negative.      Objective:      Vitals:    01/25/22 1027   Resp: 18   Weight: 74.3 kg (163 lb 12.8 oz)   Height: 6' (1.829 m)     Physical Exam  Vitals and nursing note reviewed.   Constitutional:       Appearance: He is well-developed.   HENT:      Head: Normocephalic.   Eyes:      Conjunctiva/sclera: Conjunctivae normal.   Neck:      Thyroid: No thyromegaly.      Trachea: No tracheal deviation.   Cardiovascular:      Rate and Rhythm: Normal rate.      Heart sounds: Normal heart sounds.   Pulmonary:      Effort: Pulmonary effort is normal. No respiratory distress.      Breath sounds: Normal breath sounds. No wheezing.   Abdominal:      General: Bowel sounds are normal.      Palpations: Abdomen is soft.      Tenderness: There is no abdominal tenderness. There is no rebound.      Hernia: No hernia is present.   Musculoskeletal:         General: No tenderness. Normal range of motion.      Cervical back: Normal range of motion and neck supple.    Lymphadenopathy:      Cervical: No cervical adenopathy.   Skin:     General: Skin is warm and dry.      Findings: No erythema or rash.   Neurological:      Mental Status: He is alert and oriented to person, place, and time.   Psychiatric:         Behavior: Behavior normal.         Thought Content: Thought content normal.         Judgment: Judgment normal.         Urine dipstick shows negative for all components.  Micro exam: negative for WBC's or RBC's.    Component Ref Range & Units 7 d ago   (1/18/22) 4 mo ago   (9/27/21) 6 yr ago   (10/2/15) 6 yr ago   (10/2/15)   PSA Diagnostic 0.00 - 4.00 ng/mL 12.4 High   12.4 High  CM  2.7 CM  2.7 CM    Comment: PSA Expected levels:   Hormonal Therapy: <0.05 ng/ml   Prostatectomy: <0.01 ng/ml   Radiation Therapy: <1.00 ng/ml    Resulting Agency  [facility]             Narrative  Performed by: [facility]  PSA 3 months      Specimen Collected: 01/18/22 09:34 Last Resulted: 01/18/22 19:03                 Assessment:       1. Prostate cancer    2. Nocturia more than twice per night    3. Incomplete emptying of bladder          Plan:       1. Prostate cancer  Check an MRI.  Based on the findings we will either repeat the biopsy or possibly switch to active treatment.      - Basic Metabolic Panel; Future  - MRI Prostate W W/O Contrast; Future    2. Nocturia more than twice per night  uroxatral    3. Incomplete emptying of bladder  stable             Follow up in about 2 weeks (around 2/8/2022) for Follow up, Review X-ray.

## 2022-01-25 NOTE — TELEPHONE ENCOUNTER
----- Message from Cassy Sims LPN sent at 1/25/2022  3:42 PM CST -----  Regarding: FW: Self [phone]  Patient is requesting a external order for MRI.  ----- Message -----  From: Kala Felix  Sent: 1/25/2022  12:08 PM CST  To: [facility] Staff  Subject: Self [phone]                                Type: Patient Call Back    Who called: self    What is the request in detail: fax number to imaging that nurse needed fax: [phone]    Can the clinic reply by [facility]? no    Would the patient rather a call back or a response via [facility]? Call back    Best call back number: [phone]

## 2022-02-15 ENCOUNTER — TELEPHONE (OUTPATIENT)
Dept: UROLOGY | Facility: CLINIC | Age: 67
End: 2022-02-15
Payer: MEDICARE

## 2022-02-15 DIAGNOSIS — C61 PROSTATE CANCER: Primary | ICD-10-CM

## 2022-02-15 RX ORDER — CIPROFLOXACIN 500 MG/1
500 TABLET ORAL 2 TIMES DAILY
Qty: 6 TABLET | Refills: 0 | Status: SHIPPED | OUTPATIENT
Start: 2022-02-15 | End: 2022-02-18

## 2022-02-15 NOTE — TELEPHONE ENCOUNTER
----- Message from Chiara Darío sent at 2/14/2022 10:06 AM CST -----  Type: Patient Call Back    Who called: self    What is the request in detail: patient stated that he could do the mri and would like to know what's the next step. Please call    Can the clinic reply by [facility]? no    Would the patient rather a call back or a response via [facility]? call    Best call back number:[phone]

## 2022-02-15 NOTE — TELEPHONE ENCOUNTER
Patient states he was unable to do the mri. He would like to know if he can just do another biopsy even though he doesn't really want to. He states he doesn't think a valium would help him with getting the mri.

## 2022-03-04 ENCOUNTER — TELEPHONE (OUTPATIENT)
Dept: UROLOGY | Facility: CLINIC | Age: 67
End: 2022-03-04
Payer: MEDICARE

## 2022-03-04 NOTE — TELEPHONE ENCOUNTER
----- Message from VA Sanchez MD sent at 3/4/2022  4:16 PM CST -----  Follow up    ----- Message -----  From: Rekha Carbajal MA  Sent: 3/4/2022   4:16 PM CST  To: VA Sanchez MD    I see a previous message about just scheduling a prostate biopsy I dont see it was scheduled, did you want to do that or continue with follow up?   ----- Message -----  From: VA Sanchez MD  Sent: 3/4/2022   4:15 PM CST  To: Rekha Carbajal MA    No follow up in the office and we will discuss  ----- Message -----  From: Rekha Carbajal MA  Sent: 3/4/2022   3:45 PM CST  To: VA Sanchez MD    Please advise if you would like a different test since pt cant stand mri.   ----- Message -----  From: Jose Azul  Sent: 3/4/2022  10:10 AM CST  To: [facility] Staff    Type: Patient Call Back    Who called:Self    What is the request in detail: Pt states that he was scheduled for a MRI 3 weeks ago. Pt states that he is not able to take MRI at all. Pt states he has been waiting for someone to contact him in regards to an appt    Can the clinic reply by [facility]? No    Would the patient rather a call back or a response via [facility]? Call back    Best call back number: [phone] (home)

## 2022-03-11 ENCOUNTER — OFFICE VISIT (OUTPATIENT)
Dept: UROLOGY | Facility: CLINIC | Age: 67
End: 2022-03-11
Payer: MEDICARE

## 2022-03-11 VITALS — WEIGHT: 165 LBS | HEIGHT: 72 IN | BODY MASS INDEX: 22.35 KG/M2

## 2022-03-11 DIAGNOSIS — R35.1 NOCTURIA MORE THAN TWICE PER NIGHT: ICD-10-CM

## 2022-03-11 DIAGNOSIS — R33.9 INCOMPLETE EMPTYING OF BLADDER: ICD-10-CM

## 2022-03-11 DIAGNOSIS — C61 PROSTATE CANCER: Primary | ICD-10-CM

## 2022-03-11 PROCEDURE — 99213 OFFICE O/P EST LOW 20 MIN: CPT | Mod: PBBFAC | Performed by: UROLOGY

## 2022-03-11 PROCEDURE — 99999 PR PBB SHADOW E&M-EST. PATIENT-LVL III: CPT | Mod: PBBFAC,,, | Performed by: UROLOGY

## 2022-03-11 PROCEDURE — 99214 OFFICE O/P EST MOD 30 MIN: CPT | Mod: S$PBB,,, | Performed by: UROLOGY

## 2022-03-11 PROCEDURE — 99999 PR PBB SHADOW E&M-EST. PATIENT-LVL III: ICD-10-PCS | Mod: PBBFAC,,, | Performed by: UROLOGY

## 2022-03-11 PROCEDURE — 99214 PR OFFICE/OUTPT VISIT, EST, LEVL IV, 30-39 MIN: ICD-10-PCS | Mod: S$PBB,,, | Performed by: UROLOGY

## 2022-03-11 NOTE — PROGRESS NOTES
Subjective:       Patient ID: Epifanio Guallpa is a 67 y.o. male The patient's last visit with me was on 1/25/2022.     Chief Complaint:   Chief Complaint   Patient presents with    Follow-up       Prostate Cancer   He underwent a prostate needle biopsy on 1/6/2020.  His biopsy was indicated due to: Elevated PSA.  Afterwards he experienced: Gross Hematuria and Blood in stool.  These symptoms have resolved.  His PSA prior to biopsy was 8.6.  His prostate size was 42 grams.  The ultrasound did not show a median lobe.  He currently does not have erectile dysfunction.  His pathology showed: prostate cancer.    Original Biopsy: 3+3=6 in 4/12 cores, Left Base/Mid    He met with Dr. Acevedo and agreed to active surveillance.  He has been doing active surveillance with Dr. Acevedo.  His last PSA was 8.0 on 1/22/2021.    He underwent a prostate needle biopsy on 5/6/2021.  His biopsy was indicated due to: Prostate Cancer.  Afterwards he experienced: Gross Hematuria and Blood in stool.  These symptoms have resolved.  His PSA prior to biopsy was 7.2.  His prostate size was 26 grams.  The ultrasound did not show a median lobe.  He currently does not have erectile dysfunction.  His pathology showed: Prostate Cancer.  Pathology: 3+4=7, 1 core 20%. 3+3=6, 15%    03/11/2022  Last visit his PSA remained higher.  He was unable to do the MRI due to the claustrophobic conditions.  He is here to discuss other options             ACTIVE MEDICAL ISSUES:  Patient Active Problem List   Diagnosis    Right inguinal hernia    S/P right inguinal hernia repair    RBBB       ALLERGIES AND MEDICATIONS: updated and reviewed.  Review of patient's allergies indicates:  No Known Allergies  Current Outpatient Medications   Medication Sig    alfuzosin (UROXATRAL) 10 mg Tb24 Take 1 tablet (10 mg total) by mouth daily with breakfast.    aspirin (ECOTRIN) 81 MG EC tablet Take 81 mg by mouth once daily.    CALCIUM ORAL Take by mouth once daily.     garlic Cap Take by mouth once daily.     multivitamin (THERAGRAN) per tablet Take 1 tablet by mouth once daily.    omega-3 fatty acids 1,000 mg Cap Take 1 g by mouth.     Current Facility-Administered Medications   Medication    gentamicin injection 80 mg       Review of Systems   Constitutional: Negative for activity change, fatigue, fever and unexpected weight change.   HENT: Negative for congestion.    Eyes: Negative for redness.   Respiratory: Negative for chest tightness and shortness of breath.    Cardiovascular: Negative for chest pain and leg swelling.   Gastrointestinal: Negative for abdominal pain, constipation, diarrhea, nausea and vomiting.   Genitourinary: Negative for dysuria, flank pain, frequency, hematuria, penile pain, penile swelling, scrotal swelling, testicular pain and urgency.   Musculoskeletal: Negative for arthralgias and back pain.   Neurological: Negative for dizziness and light-headedness.   Psychiatric/Behavioral: Negative for behavioral problems and confusion. The patient is not nervous/anxious.    All other systems reviewed and are negative.      Objective:      Vitals:    03/11/22 1327   Weight: 74.8 kg (165 lb)   Height: 6' (1.829 m)     Physical Exam  Vitals and nursing note reviewed.   Constitutional:       Appearance: He is well-developed.   HENT:      Head: Normocephalic.   Eyes:      Conjunctiva/sclera: Conjunctivae normal.   Neck:      Thyroid: No thyromegaly.      Trachea: No tracheal deviation.   Cardiovascular:      Rate and Rhythm: Normal rate.      Heart sounds: Normal heart sounds.   Pulmonary:      Effort: Pulmonary effort is normal. No respiratory distress.      Breath sounds: Normal breath sounds. No wheezing.   Abdominal:      General: Bowel sounds are normal.      Palpations: Abdomen is soft.      Tenderness: There is no abdominal tenderness. There is no rebound.      Hernia: No hernia is present.   Musculoskeletal:         General: No tenderness. Normal range of  motion.      Cervical back: Normal range of motion and neck supple.   Lymphadenopathy:      Cervical: No cervical adenopathy.   Skin:     General: Skin is warm and dry.      Findings: No erythema or rash.   Neurological:      Mental Status: He is alert and oriented to person, place, and time.   Psychiatric:         Behavior: Behavior normal.         Thought Content: Thought content normal.         Judgment: Judgment normal.         Urine dipstick shows negative for all components.  Micro exam: negative for WBC's or RBC's.    Component Ref Range & Units 7 d ago   (1/18/22) 4 mo ago   (9/27/21) 6 yr ago   (10/2/15) 6 yr ago   (10/2/15)   PSA Diagnostic 0.00 - 4.00 ng/mL 12.4 High   12.4 High  CM  2.7 CM  2.7 CM    Comment: PSA Expected levels:   Hormonal Therapy: <0.05 ng/ml   Prostatectomy: <0.01 ng/ml   Radiation Therapy: <1.00 ng/ml    Resulting Agency  [facility]             Narrative  Performed by: [facility]  PSA 3 months      Specimen Collected: 01/18/22 09:34 Last Resulted: 01/18/22 19:03                 Assessment:       1. Prostate cancer    2. Nocturia more than twice per night    3. Incomplete emptying of bladder          Plan:       1. Prostate cancer  He has decided to switch to active treatment he wants to go back to  with Dr. Acevedo  He may need markers    2. Nocturia more than twice per night  Limit evening fluids    3. Incomplete emptying of bladder  Uroxatral.              No follow-ups on file.

## 2022-03-18 ENCOUNTER — HOSPITAL ENCOUNTER (EMERGENCY)
Facility: HOSPITAL | Age: 67
Discharge: HOME | End: 2022-03-18
Attending: EMERGENCY MEDICINE
Payer: MEDICARE

## 2022-03-18 ENCOUNTER — NURSE TRIAGE (OUTPATIENT)
Dept: ADMINISTRATIVE | Facility: CLINIC | Age: 67
End: 2022-03-18
Payer: MEDICARE

## 2022-03-18 VITALS
HEART RATE: 71 BPM | OXYGEN SATURATION: 100 % | RESPIRATION RATE: 17 BRPM | HEIGHT: 72 IN | WEIGHT: 163 LBS | DIASTOLIC BLOOD PRESSURE: 61 MMHG | SYSTOLIC BLOOD PRESSURE: 121 MMHG | BODY MASS INDEX: 22.08 KG/M2 | TEMPERATURE: 98 F

## 2022-03-18 DIAGNOSIS — N20.0 KIDNEY STONE: Primary | ICD-10-CM

## 2022-03-18 LAB
ALBUMIN SERPL BCP-MCNC: 4.2 G/DL (ref 3.5–5.2)
ALP SERPL-CCNC: 67 U/L (ref 55–135)
ALT SERPL W/O P-5'-P-CCNC: 20 U/L (ref 10–44)
ANION GAP SERPL CALC-SCNC: 13 MMOL/L (ref 8–16)
AST SERPL-CCNC: 24 U/L (ref 10–40)
BASOPHILS # BLD AUTO: 0.02 K/UL (ref 0–0.2)
BASOPHILS NFR BLD: 0.3 % (ref 0–1.9)
BILIRUB SERPL-MCNC: 0.9 MG/DL (ref 0.1–1)
BILIRUB UR QL STRIP: NEGATIVE
BUN SERPL-MCNC: 18 MG/DL (ref 8–23)
CALCIUM SERPL-MCNC: 9.4 MG/DL (ref 8.7–10.5)
CHLORIDE SERPL-SCNC: 106 MMOL/L (ref 95–110)
CLARITY UR: CLEAR
CO2 SERPL-SCNC: 21 MMOL/L (ref 23–29)
COLOR UR: YELLOW
CREAT SERPL-MCNC: 1.1 MG/DL (ref 0.5–1.4)
DIFFERENTIAL METHOD: ABNORMAL
EOSINOPHIL # BLD AUTO: 0 K/UL (ref 0–0.5)
EOSINOPHIL NFR BLD: 0.1 % (ref 0–8)
ERYTHROCYTE [DISTWIDTH] IN BLOOD BY AUTOMATED COUNT: 12.4 % (ref 11.5–14.5)
EST. GFR  (AFRICAN AMERICAN): >60 ML/MIN/1.73 M^2
EST. GFR  (NON AFRICAN AMERICAN): >60 ML/MIN/1.73 M^2
GLUCOSE SERPL-MCNC: 131 MG/DL (ref 70–110)
GLUCOSE UR QL STRIP: NEGATIVE
HCT VFR BLD AUTO: 41.5 % (ref 40–54)
HGB BLD-MCNC: 14.4 G/DL (ref 14–18)
HGB UR QL STRIP: ABNORMAL
IMM GRANULOCYTES # BLD AUTO: 0.02 K/UL (ref 0–0.04)
IMM GRANULOCYTES NFR BLD AUTO: 0.3 % (ref 0–0.5)
KETONES UR QL STRIP: ABNORMAL
LEUKOCYTE ESTERASE UR QL STRIP: ABNORMAL
LYMPHOCYTES # BLD AUTO: 0.8 K/UL (ref 1–4.8)
LYMPHOCYTES NFR BLD: 10 % (ref 18–48)
MCH RBC QN AUTO: 32.1 PG (ref 27–31)
MCHC RBC AUTO-ENTMCNC: 34.7 G/DL (ref 32–36)
MCV RBC AUTO: 93 FL (ref 82–98)
MICROSCOPIC COMMENT: ABNORMAL
MONOCYTES # BLD AUTO: 0.5 K/UL (ref 0.3–1)
MONOCYTES NFR BLD: 6.6 % (ref 4–15)
NEUTROPHILS # BLD AUTO: 6.3 K/UL (ref 1.8–7.7)
NEUTROPHILS NFR BLD: 82.7 % (ref 38–73)
NITRITE UR QL STRIP: NEGATIVE
NRBC BLD-RTO: 0 /100 WBC
PH UR STRIP: 6 [PH] (ref 5–8)
PLATELET # BLD AUTO: 193 K/UL (ref 150–450)
PMV BLD AUTO: 10.2 FL (ref 9.2–12.9)
POTASSIUM SERPL-SCNC: 3.9 MMOL/L (ref 3.5–5.1)
PROT SERPL-MCNC: 7 G/DL (ref 6–8.4)
PROT UR QL STRIP: NEGATIVE
RBC # BLD AUTO: 4.48 M/UL (ref 4.6–6.2)
RBC #/AREA URNS HPF: 29 /HPF (ref 0–4)
SODIUM SERPL-SCNC: 140 MMOL/L (ref 136–145)
SP GR UR STRIP: 1.01 (ref 1–1.03)
URN SPEC COLLECT METH UR: ABNORMAL
UROBILINOGEN UR STRIP-ACNC: NEGATIVE EU/DL
WBC # BLD AUTO: 7.6 K/UL (ref 3.9–12.7)
WBC #/AREA URNS HPF: 19 /HPF (ref 0–5)

## 2022-03-18 PROCEDURE — 81000 URINALYSIS NONAUTO W/SCOPE: CPT | Performed by: EMERGENCY MEDICINE

## 2022-03-18 PROCEDURE — 99285 EMERGENCY DEPT VISIT HI MDM: CPT | Mod: 25

## 2022-03-18 PROCEDURE — 85025 COMPLETE CBC W/AUTO DIFF WBC: CPT | Performed by: EMERGENCY MEDICINE

## 2022-03-18 PROCEDURE — 63600175 PHARM REV CODE 636 W HCPCS: Performed by: EMERGENCY MEDICINE

## 2022-03-18 PROCEDURE — 96374 THER/PROPH/DIAG INJ IV PUSH: CPT | Mod: 59

## 2022-03-18 PROCEDURE — 80053 COMPREHEN METABOLIC PANEL: CPT | Performed by: EMERGENCY MEDICINE

## 2022-03-18 PROCEDURE — 87086 URINE CULTURE/COLONY COUNT: CPT | Performed by: EMERGENCY MEDICINE

## 2022-03-18 PROCEDURE — 25000003 PHARM REV CODE 250: Performed by: EMERGENCY MEDICINE

## 2022-03-18 PROCEDURE — 51798 US URINE CAPACITY MEASURE: CPT

## 2022-03-18 PROCEDURE — 96361 HYDRATE IV INFUSION ADD-ON: CPT | Mod: 59

## 2022-03-18 RX ORDER — OXYCODONE AND ACETAMINOPHEN 5; 325 MG/1; MG/1
1 TABLET ORAL EVERY 4 HOURS PRN
Qty: 18 TABLET | Refills: 0 | Status: SHIPPED | OUTPATIENT
Start: 2022-03-18 | End: 2024-03-04

## 2022-03-18 RX ORDER — KETOROLAC TROMETHAMINE 30 MG/ML
15 INJECTION, SOLUTION INTRAMUSCULAR; INTRAVENOUS
Status: COMPLETED | OUTPATIENT
Start: 2022-03-18 | End: 2022-03-18

## 2022-03-18 RX ORDER — OXYCODONE AND ACETAMINOPHEN 5; 325 MG/1; MG/1
1 TABLET ORAL
Status: COMPLETED | OUTPATIENT
Start: 2022-03-18 | End: 2022-03-18

## 2022-03-18 RX ADMIN — SODIUM CHLORIDE 1000 ML: 0.9 INJECTION, SOLUTION INTRAVENOUS at 03:03

## 2022-03-18 RX ADMIN — KETOROLAC TROMETHAMINE 15 MG: 30 INJECTION, SOLUTION INTRAMUSCULAR; INTRAVENOUS at 03:03

## 2022-03-18 RX ADMIN — SODIUM CHLORIDE 1000 ML: 0.9 INJECTION, SOLUTION INTRAVENOUS at 02:03

## 2022-03-18 RX ADMIN — OXYCODONE HYDROCHLORIDE AND ACETAMINOPHEN 1 TABLET: 5; 325 TABLET ORAL at 04:03

## 2022-03-18 NOTE — ED PROVIDER NOTES
Encounter Date: 3/18/2022       History     Chief Complaint   Patient presents with    Flank Pain     Pt has complaints of left sided flank pain that started around 1900, pt reports that he has not been able to urinate since 1600 yesterday but states that urine was normal prior. Pt was dx with prostate cx and scheduled to start radiation next month. Hx of kidney stones. Ambulatory, [facility]     HPI     Patient is a 67 year old male with history of prostate cancer and prior kidney stones who presents to the ED with acute onset of L flank pain.  Patient states that it feels similar in character to prior kidney stones but that is was less severe.  Denies any preceding hematuria.  States that he would not have come to the ED for pain alone but became worried when he was unable to void.  States that he doesn't feel the need to void right now, just worried that he hasn't.  No fevers.  No abd pain.  Has been pursuing active surveillance he tells me for prostate cancer but now PSA trending up so he is scheduled to start radiation next month.      Review of patient's allergies indicates:  No Known Allergies  Past Medical History:   Diagnosis Date    Anxiety     Kidney stone     Kidney stone     Prostate cancer      Past Surgical History:   Procedure Laterality Date    HERNIA REPAIR      TONSILLECTOMY      trus/bx 2020      trus/bx 2021      WISDOM TOOTH EXTRACTION       Family History   Problem Relation Age of Onset    Heart disease Father     Hypertension Father     Alzheimer's disease Mother     No Known Problems Sister     No Known Problems Brother     Heart disease Maternal Grandmother     Heart attack Maternal Grandmother     No Known Problems Maternal Grandfather     No Known Problems Paternal Grandmother     No Known Problems Paternal Grandfather      Social History     Tobacco Use    Smoking status: Never Smoker    Smokeless tobacco: Never Used   Substance Use Topics    Alcohol use: Yes      Alcohol/week: 1.0 standard drink     Types: 1 Glasses of wine per week     Comment: occasionally- once a week    Drug use: No     Review of Systems   Constitutional: Negative for fever.   HENT: Negative for trouble swallowing.    Respiratory: Negative for shortness of breath.    Cardiovascular: Negative for chest pain.   Gastrointestinal: Positive for nausea. Negative for abdominal pain and diarrhea.   Genitourinary: Positive for difficulty urinating and flank pain. Negative for dysuria, frequency and hematuria.   Musculoskeletal: Negative for back pain.   Skin: Negative for rash.   Neurological: Negative for weakness.   Psychiatric/Behavioral: Negative for confusion.       Physical Exam     Initial Vitals [03/18/22 0121]   BP Pulse Resp Temp SpO2   (!) 160/85 72 18 98.3 °F (36.8 °C) 97 %      MAP       --         Physical Exam     Constitutional: Well-developed, Well-nourished, No acute distressed, appears comfortable, Alert  HENT: Normocephalic, Atraumatic, Moist mucous membranes  Eyes: Conjunctiva normal  Neck: Supple, ROM normal  Cardiac: Regular rate  Pulmonary/Chest wall: No respiratory distress  Abdomen: Soft, nontender, nondistended, no rebound, no guarding, no CVA tenderness  Musc: Normal ROM, No obvious joint swelling  Neuro: oriented x 3, no focal neurologic deficit  Skin: Pink, warm, dry.  No rashes  Psych: Behavior normal, Mood and affect normal    Previous medical record and nursing documentation reviewed where available.          ED Course   Procedures  Labs Reviewed   CBC W/ AUTO DIFFERENTIAL - Abnormal; Notable for the following components:       Result Value    RBC 4.48 (*)     MCH 32.1 (*)     Lymph # 0.8 (*)     Gran % 82.7 (*)     Lymph % 10.0 (*)     All other components within normal limits   COMPREHENSIVE METABOLIC PANEL - Abnormal; Notable for the following components:    CO2 21 (*)     Glucose 131 (*)     All other components within normal limits   URINALYSIS, REFLEX TO URINE CULTURE -  Abnormal; Notable for the following components:    Ketones, UA 1+ (*)     Occult Blood UA 2+ (*)     Leukocytes, UA 1+ (*)     All other components within normal limits    Narrative:     Specimen Source->Urine   URINALYSIS MICROSCOPIC - Abnormal; Notable for the following components:    RBC, UA 29 (*)     WBC, UA 19 (*)     All other components within normal limits    Narrative:     Specimen Source->Urine   CULTURE, URINE          Imaging Results           CT Renal Stone Study ABD Pelvis WO (Final result)  Result time 03/18/22 03:04:04    Final result by Israel Portillo MD (03/18/22 03:04:04)                 Impression:      Findings most consistent with a calculus at the level of the ureteral orifice of the distal left ureter at the left ureterovesicular junction with obstructive findings as above.    Bilateral nonobstructing nephrolithiasis is also noted.    Additional findings are noted as above.    This report was flagged in Epic as abnormal.      Electronically signed by: Israel Portillo  Date:    03/18/2022  Time:    03:04             Narrative:    EXAMINATION:  CT RENAL STONE STUDY ABD PELVIS WO    CLINICAL HISTORY:  Flank pain, kidney stone suspected;    TECHNIQUE:  Low dose axial images, sagittal and coronal reformations were obtained from the lung bases to the pubic symphysis.  Contrast was not administered.    COMPARISON:  None    FINDINGS:  As best seen on axial image 149 at the posterior left aspect of the urinary bladder there is a density consistent with a calculus measuring approximately 2.8 mm.  Although it is possible that this has recently passed into the urinary bladder, given its position it is thought likely at the level of the ureteral orifice of the distal left ureter at the left ureterovesicular junction.  There is mild left hydroureter and hydronephrosis with mild to moderate perinephric stranding.    Bilateral nonobstructing nephrolithiasis is also noted.  There is no evidence for  right-sided ureteral calculus or obstructive uropathy.  The urinary bladder otherwise appears unremarkable for degree of distention.    The visualized lung bases demonstrate mild atelectatic change.  When accounting for limitations of the examination, there is no evidence for acute process of the liver, gallbladder, pancreas, spleen, stomach, or adrenal glands.  The abdominal aorta appears normal in caliber, atherosclerotic change is noted, otherwise not optimally evaluated on this noncontrast exam.  The prostate appears prominent.  Small fat-density inguinal hernias and a tiny fat density umbilical hernia noted, there is no bowel involvement.    There is no evidence for small bowel obstructive process.  The appendix is identified, difficult to delineate in its entirety, it does not appear inflamed.  Diverticula of the colon are noted, there is no evidence for acute diverticulitis, there is no evidence for inflammatory or obstructive process of the colon.  There is no evidence for free intraperitoneal air.    There is appearance of nonspecific soft tissue thickening about the right lower anterior pelvis near the inguinal region, this may relate to postoperative change, clinical correlation is needed.  The visualized osseous structures appear intact.  Chronic changes are noted.                                 Medications   sodium chloride 0.9% bolus 1,000 mL (0 mLs Intravenous Stopped 3/18/22 0317)   ketorolac injection 15 mg (15 mg Intravenous Given 3/18/22 0331)   sodium chloride 0.9% bolus 1,000 mL (0 mLs Intravenous Stopped 3/18/22 0430)   oxyCODONE-acetaminophen 5-325 mg per tablet 1 tablet (1 tablet Oral Given 3/18/22 0451)     Medical Decision Making:   History:   Old Medical Records: I decided to obtain old medical records.  Clinical Tests:   Lab Tests: Ordered and Reviewed  Radiological Study: Ordered and Reviewed  ED Management:  Patient is a 67 year old male with history of prostate cancer now here with  flank pain and inability to urinate for roughly 12 hours.  Bladder scan in the ED without significant urine in the bladder.  He is HD stable without signs of serious volume depletion - states that he has been eating and drinking normally.  Concern for significant obstruction? Neoplasm vs stone.  Have treated the patient with IVF and analgesia.  Labs including kidney function reassuring.  CT abd/pel with small renal stone at the level of the distal ureter with mild hydro.  No clear etiology of decreased UOP.  He eventually voided in the ED.  UA normal.  Possibly all still related to the kidney stone.  Plan to discharge home with recommendations to continue oral hydration, supportive care with analgesia.  Close follow up with urology.  Return to the ED with inability to void.                      Clinical Impression:   Final diagnoses:  [N20.0] Kidney stone (Primary)          ED Disposition Condition    Discharge Stable        ED Prescriptions     Medication Sig Dispense Start Date End Date Auth. Provider    oxyCODONE-acetaminophen (PERCOCET) 5-325 mg per tablet Take 1 tablet by mouth every 4 (four) hours as needed for Pain. 18 tablet 3/18/2022  Alexus Jeff MD        Follow-up Information     Follow up With Specialties Details Why Contact Info        Follow up with your urologist           Alexus Jeff MD  03/19/22 0415

## 2022-03-18 NOTE — ED NOTES
Bed: 04main  Expected date:   Expected time:   Means of arrival: Personal Transportation  Comments:

## 2022-03-18 NOTE — TELEPHONE ENCOUNTER
Pt c/o 4/10 pain to L kidney, and unable urinate x 8 hours.  Pt advised per protocol and verbalized understanding.    Reason for Disposition   [1] Unable to urinate (or only a few drops) > 4 hours AND [2] bladder feels very full (e.g., palpable bladder or strong urge to urinate)    Additional Information   Negative: Shock suspected (e.g., cold/pale/clammy skin, too weak to stand, low BP, rapid pulse)   Negative: Sounds like a life-threatening emergency to the triager    Protocols used: URINARY SYMPTOMS-A-AH

## 2022-03-20 LAB — BACTERIA UR CULT: NO GROWTH

## 2022-03-23 ENCOUNTER — LAB VISIT (OUTPATIENT)
Dept: LAB | Facility: HOSPITAL | Age: 67
End: 2022-03-23
Attending: UROLOGY
Payer: MEDICARE

## 2022-03-23 DIAGNOSIS — C61 PROSTATE CANCER: ICD-10-CM

## 2022-03-23 LAB — COMPLEXED PSA SERPL-MCNC: 11.5 NG/ML (ref 0–4)

## 2022-03-23 PROCEDURE — 36415 COLL VENOUS BLD VENIPUNCTURE: CPT | Performed by: UROLOGY

## 2022-03-23 PROCEDURE — 84153 ASSAY OF PSA TOTAL: CPT | Performed by: UROLOGY

## 2022-03-29 ENCOUNTER — PROCEDURE VISIT (OUTPATIENT)
Dept: UROLOGY | Facility: CLINIC | Age: 67
End: 2022-03-29
Attending: UROLOGY
Payer: MEDICARE

## 2022-03-29 DIAGNOSIS — C61 PROSTATE CANCER: Primary | ICD-10-CM

## 2022-03-29 PROCEDURE — 55876 TRUS W/ MARKERS: ICD-10-PCS | Mod: S$PBB,,, | Performed by: UROLOGY

## 2022-03-29 PROCEDURE — 55876 PLACE RT DEVICE/MARKER PROS: CPT | Mod: PBBFAC | Performed by: UROLOGY

## 2022-03-29 PROCEDURE — 76942 ECHO GUIDE FOR BIOPSY: CPT | Mod: 26,S$PBB,, | Performed by: UROLOGY

## 2022-03-29 PROCEDURE — 76942 TRUS W/ MARKERS: ICD-10-PCS | Mod: 26,S$PBB,, | Performed by: UROLOGY

## 2022-03-29 PROCEDURE — A4648 IMPLANTABLE TISSUE MARKER: HCPCS | Mod: PBBFAC | Performed by: UROLOGY

## 2022-03-29 RX ORDER — CIPROFLOXACIN 500 MG/1
500 TABLET ORAL 2 TIMES DAILY
Qty: 6 TABLET | Refills: 0 | Status: SHIPPED | OUTPATIENT
Start: 2022-03-29 | End: 2022-04-01

## 2022-03-29 NOTE — PROCEDURES
"TRUS w/ Markers    Date/Time: 3/29/2022 11:00 AM  Performed by: VA Sanchez MD  Authorized by: VA Sanchez MD     Consent Done?:  Yes (Written)  Time out: Immediately prior to procedure a "time out" was called to verify the correct patient, procedure, equipment, support staff and site/side marked as required.    History of Prostate Cancer: Yes    Position:  Lateral  Anesthesia:  10 cc 2% Lidocaine  Preparation: Patient was prepped and draped in usual sterile fashion    1cm gold seed placed at right base left apex: Gold seeds paired in a couplet was placed at the right mid peripheral zone.  Another couplet placed left mid peripheral zone.    Patient Tolerance:  Patient tolerated the procedure well with no immediate complications      "

## 2022-03-29 NOTE — PROGRESS NOTES
Subjective:       Patient ID: Epifanio Guallpa is a 67 y.o. male The patient's last visit with me was on 1/25/2022.     Chief Complaint:   No chief complaint on file.      Prostate Cancer   He underwent a prostate needle biopsy on 1/6/2020.  His biopsy was indicated due to: Elevated PSA.  Afterwards he experienced: Gross Hematuria and Blood in stool.  These symptoms have resolved.  His PSA prior to biopsy was 8.6.  His prostate size was 42 grams.  The ultrasound did not show a median lobe.  He currently does not have erectile dysfunction.  His pathology showed: prostate cancer.    Original Biopsy: 3+3=6 in 4/12 cores, Left Base/Mid    He met with Dr. Acevedo and agreed to active surveillance.  He has been doing active surveillance with Dr. Acevedo.  His last PSA was 8.0 on 1/22/2021.    He underwent a prostate needle biopsy on 5/6/2021.  His biopsy was indicated due to: Prostate Cancer.  Afterwards he experienced: Gross Hematuria and Blood in stool.  These symptoms have resolved.  His PSA prior to biopsy was 7.2.  His prostate size was 26 grams.  The ultrasound did not show a median lobe.  He currently does not have erectile dysfunction.  His pathology showed: Prostate Cancer.  Pathology: 3+4=7, 1 core 20%. 3+3=6, 15%    03/29/2022  Last visit his PSA remained higher.  He was unable to do the MRI due to the claustrophobic conditions.  He is here to discuss other options             ACTIVE MEDICAL ISSUES:  Patient Active Problem List   Diagnosis    Right inguinal hernia    S/P right inguinal hernia repair    RBBB       ALLERGIES AND MEDICATIONS: updated and reviewed.  Review of patient's allergies indicates:  No Known Allergies  Current Outpatient Medications   Medication Sig    alfuzosin (UROXATRAL) 10 mg Tb24 Take 1 tablet (10 mg total) by mouth daily with breakfast.    aspirin (ECOTRIN) 81 MG EC tablet Take 81 mg by mouth once daily.    CALCIUM ORAL Take by mouth once daily.    garlic Cap Take by mouth  I did not personally see the patient but I have reviewed and agree with the assessment and plan as documented in this note.  Hien Bassett, PhD -- Supervisor   Licensed Psychologist        once daily.     multivitamin (THERAGRAN) per tablet Take 1 tablet by mouth once daily.    omega-3 fatty acids 1,000 mg Cap Take 1 g by mouth.    oxyCODONE-acetaminophen (PERCOCET) 5-325 mg per tablet Take 1 tablet by mouth every 4 (four) hours as needed for Pain.     Current Facility-Administered Medications   Medication    gentamicin injection 80 mg       Review of Systems   Constitutional: Negative for activity change, fatigue, fever and unexpected weight change.   HENT: Negative for congestion.    Eyes: Negative for redness.   Respiratory: Negative for chest tightness and shortness of breath.    Cardiovascular: Negative for chest pain and leg swelling.   Gastrointestinal: Negative for abdominal pain, constipation, diarrhea, nausea and vomiting.   Genitourinary: Negative for dysuria, flank pain, frequency, hematuria, penile pain, penile swelling, scrotal swelling, testicular pain and urgency.   Musculoskeletal: Negative for arthralgias and back pain.   Neurological: Negative for dizziness and light-headedness.   Psychiatric/Behavioral: Negative for behavioral problems and confusion. The patient is not nervous/anxious.    All other systems reviewed and are negative.      Objective:      There were no vitals filed for this visit.  Physical Exam  Vitals and nursing note reviewed.   Constitutional:       Appearance: He is well-developed.   HENT:      Head: Normocephalic.   Eyes:      Conjunctiva/sclera: Conjunctivae normal.   Neck:      Thyroid: No thyromegaly.      Trachea: No tracheal deviation.   Cardiovascular:      Rate and Rhythm: Normal rate.      Heart sounds: Normal heart sounds.   Pulmonary:      Effort: Pulmonary effort is normal. No respiratory distress.      Breath sounds: Normal breath sounds. No wheezing.   Abdominal:      General: Bowel sounds are normal.      Palpations: Abdomen is soft.      Tenderness: There is no abdominal tenderness. There is no rebound.      Hernia: No hernia is present.    Musculoskeletal:         General: No tenderness. Normal range of motion.      Cervical back: Normal range of motion and neck supple.   Lymphadenopathy:      Cervical: No cervical adenopathy.   Skin:     General: Skin is warm and dry.      Findings: No erythema or rash.   Neurological:      Mental Status: He is alert and oriented to person, place, and time.   Psychiatric:         Behavior: Behavior normal.         Thought Content: Thought content normal.         Judgment: Judgment normal.         Urine dipstick shows negative for all components.  Micro exam: negative for WBC's or RBC's.    Component Ref Range & Units 7 d ago   (1/18/22) 4 mo ago   (9/27/21) 6 yr ago   (10/2/15) 6 yr ago   (10/2/15)   PSA Diagnostic 0.00 - 4.00 ng/mL 12.4 High   12.4 High  CM  2.7 CM  2.7 CM    Comment: PSA Expected levels:   Hormonal Therapy: <0.05 ng/ml   Prostatectomy: <0.01 ng/ml   Radiation Therapy: <1.00 ng/ml    Resulting Agency  [facility]             Narrative  Performed by: [facility]  PSA 3 months      Specimen Collected: 01/18/22 09:34 Last Resulted: 01/18/22 19:03                 Assessment:       No diagnosis found.      Plan:       1. Prostate cancer  He has decided to switch to active treatment he wants to go back to  with Dr. Acevedo  He may need markers    2. Nocturia more than twice per night  Limit evening fluids    3. Incomplete emptying of bladder  Uroxatral.              No follow-ups on file.

## 2022-05-13 ENCOUNTER — TELEPHONE (OUTPATIENT)
Dept: UROLOGY | Facility: CLINIC | Age: 67
End: 2022-05-13
Payer: MEDICARE

## 2022-05-13 NOTE — TELEPHONE ENCOUNTER
----- Message from Alexus Branch MA sent at 5/13/2022  1:56 PM CDT -----  Regarding: FW: Kady with Dr Acevedo    ----- Message -----  From: Maude Hays  Sent: 5/13/2022   1:45 PM CDT  To: [facility] Staff  Subject: Kady with Dr Acevedo                              .Type: Patient Call Back    Who called: Kady     What is the request in detail: patient is on treatment 14 out of 28 radiation. He Is having discomfort with urination and trouble starting his stream. Azo is not working. Placed on break from radiation. Is there any medication recommended. Urine sent for UA and culture results negative     Can the clinic reply by [facility]?    Would the patient rather a call back or a response via [facility]? Call     Best call back number [phone]

## 2022-05-18 ENCOUNTER — OFFICE VISIT (OUTPATIENT)
Dept: UROLOGY | Facility: CLINIC | Age: 67
End: 2022-05-18
Payer: MEDICARE

## 2022-05-18 VITALS — BODY MASS INDEX: 21.33 KG/M2 | WEIGHT: 157.31 LBS

## 2022-05-18 DIAGNOSIS — R30.0 DYSURIA: Primary | ICD-10-CM

## 2022-05-18 DIAGNOSIS — C61 PROSTATE CANCER: ICD-10-CM

## 2022-05-18 PROCEDURE — 99214 PR OFFICE/OUTPT VISIT, EST, LEVL IV, 30-39 MIN: ICD-10-PCS | Mod: S$PBB,,, | Performed by: STUDENT IN AN ORGANIZED HEALTH CARE EDUCATION/TRAINING PROGRAM

## 2022-05-18 PROCEDURE — 99999 PR PBB SHADOW E&M-EST. PATIENT-LVL III: ICD-10-PCS | Mod: PBBFAC,,, | Performed by: STUDENT IN AN ORGANIZED HEALTH CARE EDUCATION/TRAINING PROGRAM

## 2022-05-18 PROCEDURE — 99214 OFFICE O/P EST MOD 30 MIN: CPT | Mod: S$PBB,,, | Performed by: STUDENT IN AN ORGANIZED HEALTH CARE EDUCATION/TRAINING PROGRAM

## 2022-05-18 PROCEDURE — 99999 PR PBB SHADOW E&M-EST. PATIENT-LVL III: CPT | Mod: PBBFAC,,, | Performed by: STUDENT IN AN ORGANIZED HEALTH CARE EDUCATION/TRAINING PROGRAM

## 2022-05-18 PROCEDURE — 99213 OFFICE O/P EST LOW 20 MIN: CPT | Mod: PBBFAC | Performed by: STUDENT IN AN ORGANIZED HEALTH CARE EDUCATION/TRAINING PROGRAM

## 2022-05-18 RX ORDER — ACETAMINOPHEN 500 MG
500 TABLET ORAL EVERY 6 HOURS PRN
Qty: 40 TABLET | Refills: 0 | Status: SHIPPED | OUTPATIENT
Start: 2022-05-18 | End: 2022-05-28

## 2022-05-18 RX ORDER — OXYBUTYNIN CHLORIDE 5 MG/1
5 TABLET, EXTENDED RELEASE ORAL DAILY
Qty: 30 TABLET | Refills: 11 | Status: SHIPPED | OUTPATIENT
Start: 2022-05-18 | End: 2022-06-14

## 2022-05-18 RX ORDER — IBUPROFEN 800 MG/1
800 TABLET ORAL EVERY 6 HOURS PRN
Qty: 20 TABLET | Refills: 0 | Status: SHIPPED | OUTPATIENT
Start: 2022-05-18 | End: 2022-05-23

## 2022-05-18 NOTE — PROGRESS NOTES
Patient ID: Epifanio Guallpa is a 67 y.o. male.    Chief Complaint: Dysuria, Nocturia, Urinary Frequency, and Urinary Retention      HPI  67 y.o. who presents to the Urology clinic for evaluation of dysuria. Patient well known to urology clinic, last seen by Dr. Sanchez 3/29/22. Patient w/ hx of prostate cancer. Completed radiation therapy with Dr. Acevedo. On alfuzosin.   For past two weeks patient has been having pain at the end of urination associated with R flank pain. Patient has drastically cut down on fluids due to discomfort from voiding. Patient denies hematuria.   IPSS 7 ([id number])  Azo did not help symptoms  He has tried ibuprofen/tylenol intermittently but not scheduled  Medically Necessary ROS documented in HPI    Past Medical History  Active Ambulatory Problems     Diagnosis Date Noted    Right inguinal hernia 03/14/2017    S/P right inguinal hernia repair 03/15/2017    RBBB 07/02/2018     Resolved Ambulatory Problems     Diagnosis Date Noted    No Resolved Ambulatory Problems     Past Medical History:   Diagnosis Date    Anxiety     Kidney stone     Kidney stone     Prostate cancer          Past Surgical History  Past Surgical History:   Procedure Laterality Date    HERNIA REPAIR      TONSILLECTOMY      trus/bx 2020      trus/bx 2021      WISDOM TOOTH EXTRACTION         Social History  Social Connections: Not on file       Medications    Current Outpatient Medications:     alfuzosin (UROXATRAL) 10 mg Tb24, Take 1 tablet (10 mg total) by mouth daily with breakfast., Disp: 30 tablet, Rfl: 11    aspirin (ECOTRIN) 81 MG EC tablet, Take 81 mg by mouth once daily., Disp: , Rfl:     CALCIUM ORAL, Take by mouth once daily., Disp: , Rfl:     garlic Cap, Take by mouth once daily. , Disp: , Rfl:     multivitamin (THERAGRAN) per tablet, Take 1 tablet by mouth once daily., Disp: , Rfl:     omega-3 fatty acids 1,000 mg Cap, Take 1 g by mouth., Disp: , Rfl:     oxyCODONE-acetaminophen (PERCOCET)  5-325 mg per tablet, Take 1 tablet by mouth every 4 (four) hours as needed for Pain., Disp: 18 tablet, Rfl: 0    Current Facility-Administered Medications:     gentamicin injection 80 mg, 80 mg, Intramuscular, 1 time in Clinic[facility], [facility] MD    Allergies  Review of patient's allergies indicates:  No Known Allergies    Patient's PMH, FH, Social hx, Medications, allergies reviewed and updated as pertinent to today's visit    Objective:      Physical Exam  Constitutional:       Appearance: He is well-developed.   HENT:      Head: Normocephalic and atraumatic.   Eyes:      Conjunctiva/sclera: Conjunctivae normal.   Pulmonary:      Effort: Pulmonary effort is normal. No respiratory distress.   Abdominal:      General: Abdomen is flat. There is no distension.      Palpations: Abdomen is soft. There is no mass.      Tenderness: There is no abdominal tenderness. There is no right CVA tenderness, left CVA tenderness or guarding.   Skin:     General: Skin is warm.      Findings: No rash.   Neurological:      Mental Status: He is alert and oriented to person, place, and time.   Psychiatric:         Behavior: Behavior normal.             Lab Results   Component Value Date    PSADIAG 11.5 (H) 03/23/2022    PSADIAG 12.4 (H) 01/18/2022    PSADIAG 12.4 (H) 09/27/2021        Assessment:       1. Dysuria    2. Prostate cancer        Plan:       Discussed post radiation inflammation of the prostate  Discussed importance of hydration  Recommend tylenol/ibuprofen anti-inflammatory  Discussed oxybutynin for bladder spasms to help with urinary discomfort; Rx provided  POCT UA with ketones, neg nitrites/blood/WBC  PVR 14 cc, no evidence of retention    Follow up with established urologist Dr. Sanchez

## 2022-06-14 ENCOUNTER — OFFICE VISIT (OUTPATIENT)
Dept: UROLOGY | Facility: CLINIC | Age: 67
End: 2022-06-14
Payer: MEDICARE

## 2022-06-14 VITALS — HEIGHT: 72 IN | WEIGHT: 156.75 LBS | BODY MASS INDEX: 21.23 KG/M2 | RESPIRATION RATE: 16 BRPM

## 2022-06-14 DIAGNOSIS — R30.0 DYSURIA: ICD-10-CM

## 2022-06-14 DIAGNOSIS — C61 PROSTATE CANCER: Primary | ICD-10-CM

## 2022-06-14 DIAGNOSIS — R35.1 NOCTURIA MORE THAN TWICE PER NIGHT: ICD-10-CM

## 2022-06-14 DIAGNOSIS — R33.9 INCOMPLETE EMPTYING OF BLADDER: ICD-10-CM

## 2022-06-14 PROCEDURE — 99214 PR OFFICE/OUTPT VISIT, EST, LEVL IV, 30-39 MIN: ICD-10-PCS | Mod: S$PBB,,, | Performed by: UROLOGY

## 2022-06-14 PROCEDURE — 99213 OFFICE O/P EST LOW 20 MIN: CPT | Mod: PBBFAC | Performed by: UROLOGY

## 2022-06-14 PROCEDURE — 99999 PR PBB SHADOW E&M-EST. PATIENT-LVL III: ICD-10-PCS | Mod: PBBFAC,,, | Performed by: UROLOGY

## 2022-06-14 PROCEDURE — 99999 PR PBB SHADOW E&M-EST. PATIENT-LVL III: CPT | Mod: PBBFAC,,, | Performed by: UROLOGY

## 2022-06-14 PROCEDURE — 81001 URINALYSIS AUTO W/SCOPE: CPT | Mod: PBBFAC | Performed by: UROLOGY

## 2022-06-14 PROCEDURE — 99214 OFFICE O/P EST MOD 30 MIN: CPT | Mod: S$PBB,,, | Performed by: UROLOGY

## 2022-06-14 NOTE — PROGRESS NOTES
Subjective:       Patient ID: Epifanio Guallpa is a 67 y.o. male who was last seen in this office 5/18/2022    Chief Complaint:   Chief Complaint   Patient presents with    Follow-up       Prostate Cancer   He underwent a prostate needle biopsy on 1/6/2020.  His biopsy was indicated due to: Elevated PSA.  Afterwards he experienced: Gross Hematuria and Blood in stool.  These symptoms have resolved.  His PSA prior to biopsy was 8.6.  His prostate size was 42 grams.  The ultrasound did not show a median lobe.  He currently does not have erectile dysfunction.  His pathology showed: prostate cancer.    Original Biopsy: 3+3=6 in 4/12 cores, Left Base/Mid    He met with Dr. Acevedo and agreed to active surveillance.  He has been doing active surveillance with Dr. Acevedo.  His last PSA was 8.0 on 1/22/2021.    He underwent a prostate needle biopsy on 5/6/2021.  His biopsy was indicated due to: Prostate Cancer.  Afterwards he experienced: Gross Hematuria and Blood in stool.  These symptoms have resolved.  His PSA prior to biopsy was 7.2.  His prostate size was 26 grams.  The ultrasound did not show a median lobe.  He currently does not have erectile dysfunction.  His pathology showed: Prostate Cancer.  Pathology: 3+4=7, 1 core 20%. 3+3=6, 15%    03/11/2022  Last visit his PSA remained higher.  He was unable to do the MRI due to the claustrophobic conditions.  He is here to discuss other options     3/29/2022  Markers were placed    5/18/2022  He saw Dr. Marx for an urgent visit regarding dysuria.  He finished his radiation by Dr. Acevedo at [facility] on 6/09/2022.    06/14/2022  He is feeling better.  The dysuria is much improved.     ACTIVE MEDICAL ISSUES:  Patient Active Problem List   Diagnosis    Right inguinal hernia    S/P right inguinal hernia repair    RBBB       ALLERGIES AND MEDICATIONS: updated and reviewed.  Review of patient's allergies indicates:  No Known Allergies  Current Outpatient Medications   Medication  Sig    alfuzosin (UROXATRAL) 10 mg Tb24 Take 1 tablet (10 mg total) by mouth daily with breakfast.    aspirin (ECOTRIN) 81 MG EC tablet Take 81 mg by mouth once daily.    CALCIUM ORAL Take by mouth once daily.    garlic Cap Take by mouth once daily.     multivitamin (THERAGRAN) per tablet Take 1 tablet by mouth once daily.    omega-3 fatty acids 1,000 mg Cap Take 1 g by mouth.    oxyCODONE-acetaminophen (PERCOCET) 5-325 mg per tablet Take 1 tablet by mouth every 4 (four) hours as needed for Pain.     Current Facility-Administered Medications   Medication    gentamicin injection 80 mg       Review of Systems   Constitutional: Negative for activity change, fatigue, fever and unexpected weight change.   HENT: Negative for congestion.    Eyes: Negative for redness.   Respiratory: Negative for chest tightness and shortness of breath.    Cardiovascular: Negative for chest pain and leg swelling.   Gastrointestinal: Negative for abdominal pain, constipation, diarrhea, nausea and vomiting.   Genitourinary: Positive for dysuria and frequency. Negative for flank pain, hematuria, penile pain, penile swelling, scrotal swelling, testicular pain and urgency.   Musculoskeletal: Negative for arthralgias and back pain.   Neurological: Negative for dizziness and light-headedness.   Psychiatric/Behavioral: Negative for behavioral problems and confusion. The patient is not nervous/anxious.    All other systems reviewed and are negative.      Objective:      Vitals:    06/14/22 1039   Resp: 16   Weight: 71.1 kg (156 lb 12 oz)   Height: 6' (1.829 m)     Physical Exam  Vitals and nursing note reviewed.   Constitutional:       Appearance: He is well-developed.   HENT:      Head: Normocephalic.   Eyes:      Conjunctiva/sclera: Conjunctivae normal.   Neck:      Thyroid: No thyromegaly.      Trachea: No tracheal deviation.   Cardiovascular:      Rate and Rhythm: Normal rate.      Heart sounds: Normal heart sounds.   Pulmonary:       Effort: Pulmonary effort is normal. No respiratory distress.      Breath sounds: Normal breath sounds. No wheezing.   Abdominal:      General: Bowel sounds are normal.      Palpations: Abdomen is soft.      Tenderness: There is no abdominal tenderness. There is no rebound.      Hernia: No hernia is present.   Musculoskeletal:         General: No tenderness. Normal range of motion.      Cervical back: Normal range of motion and neck supple.   Lymphadenopathy:      Cervical: No cervical adenopathy.   Skin:     General: Skin is warm and dry.      Findings: No erythema or rash.   Neurological:      Mental Status: He is alert and oriented to person, place, and time.   Psychiatric:         Behavior: Behavior normal.         Thought Content: Thought content normal.         Judgment: Judgment normal.         Urine dipstick shows negative for all components.  Micro exam: negative for WBC's or RBC's.    Assessment:       1. Prostate cancer    2. Dysuria    3. Nocturia more than twice per night    4. Incomplete emptying of bladder          Plan:       1. Dysuria  Okay to stop Oxybutynin    2. Prostate cancer    - Prostate Specific Antigen, Diagnostic; Future    3. Nocturia more than twice per night    - POCT urinalysis, dipstick or tablet reag    4. Incomplete emptying of bladder  stable            Follow up in about 3 months (around 9/14/2022) for Follow up, Review PSA.

## 2022-08-15 ENCOUNTER — LAB VISIT (OUTPATIENT)
Dept: LAB | Facility: HOSPITAL | Age: 67
End: 2022-08-15
Attending: UROLOGY
Payer: MEDICARE

## 2022-08-15 DIAGNOSIS — C61 PROSTATE CANCER: ICD-10-CM

## 2022-08-15 LAB — COMPLEXED PSA SERPL-MCNC: 2.6 NG/ML (ref 0–4)

## 2022-08-15 PROCEDURE — 84153 ASSAY OF PSA TOTAL: CPT | Performed by: UROLOGY

## 2022-08-15 PROCEDURE — 36415 COLL VENOUS BLD VENIPUNCTURE: CPT | Performed by: UROLOGY

## 2022-10-05 DIAGNOSIS — Z12.11 COLON CANCER SCREENING: ICD-10-CM

## 2022-10-06 ENCOUNTER — OFFICE VISIT (OUTPATIENT)
Dept: UROLOGY | Facility: CLINIC | Age: 67
End: 2022-10-06
Payer: MEDICARE

## 2022-10-06 VITALS — BODY MASS INDEX: 22.22 KG/M2 | WEIGHT: 163.81 LBS

## 2022-10-06 DIAGNOSIS — R33.9 INCOMPLETE EMPTYING OF BLADDER: ICD-10-CM

## 2022-10-06 DIAGNOSIS — R35.1 NOCTURIA MORE THAN TWICE PER NIGHT: ICD-10-CM

## 2022-10-06 DIAGNOSIS — C61 PROSTATE CANCER: Primary | ICD-10-CM

## 2022-10-06 LAB
BILIRUB SERPL-MCNC: NEGATIVE MG/DL
BLOOD URINE, POC: NEGATIVE
COLOR, POC UA: YELLOW
GLUCOSE UR QL STRIP: NORMAL
KETONES UR QL STRIP: NEGATIVE
LEUKOCYTE ESTERASE URINE, POC: NEGATIVE
NITRITE, POC UA: NEGATIVE
PH, POC UA: 6
PROTEIN, POC: NORMAL
SPECIFIC GRAVITY, POC UA: 1020
UROBILINOGEN, POC UA: NORMAL

## 2022-10-06 PROCEDURE — 99213 OFFICE O/P EST LOW 20 MIN: CPT | Mod: PBBFAC | Performed by: UROLOGY

## 2022-10-06 PROCEDURE — 99213 PR OFFICE/OUTPT VISIT, EST, LEVL III, 20-29 MIN: ICD-10-PCS | Mod: S$PBB,,, | Performed by: UROLOGY

## 2022-10-06 PROCEDURE — 99213 OFFICE O/P EST LOW 20 MIN: CPT | Mod: S$PBB,,, | Performed by: UROLOGY

## 2022-10-06 PROCEDURE — 99999 PR PBB SHADOW E&M-EST. PATIENT-LVL III: CPT | Mod: PBBFAC,,, | Performed by: UROLOGY

## 2022-10-06 PROCEDURE — 81001 URINALYSIS AUTO W/SCOPE: CPT | Mod: PBBFAC | Performed by: UROLOGY

## 2022-10-06 PROCEDURE — 99999 PR PBB SHADOW E&M-EST. PATIENT-LVL III: ICD-10-PCS | Mod: PBBFAC,,, | Performed by: UROLOGY

## 2022-10-06 NOTE — PROGRESS NOTES
Subjective:       Patient ID: Epifanio Guallpa is a 67 y.o. male who was last seen in this office 6/14/2022    Chief Complaint:   Chief Complaint   Patient presents with    Follow-up     Prostate Cancer   He underwent a prostate needle biopsy on 1/6/2020.  His biopsy was indicated due to: Elevated PSA.  Afterwards he experienced: Gross Hematuria and Blood in stool.  These symptoms have resolved.  His PSA prior to biopsy was 8.6.  His prostate size was 42 grams.  The ultrasound did not show a median lobe.  He currently does not have erectile dysfunction.  His pathology showed: prostate cancer.    Original Biopsy: 3+3=6 in 4/12 cores, Left Base/Mid    He met with Dr. Acevedo and agreed to active surveillance.  He has been doing active surveillance with Dr. Acevedo.  His last PSA was 8.0 on 1/22/2021.    He underwent a prostate needle biopsy on 5/6/2021.  His biopsy was indicated due to: Prostate Cancer.  Afterwards he experienced: Gross Hematuria and Blood in stool.  These symptoms have resolved.  His PSA prior to biopsy was 7.2.  His prostate size was 26 grams.  The ultrasound did not show a median lobe.  He currently does not have erectile dysfunction.  His pathology showed: Prostate Cancer.  Pathology: 3+4=7, 1 core 20%. 3+3=6, 15%    03/11/2022  Last visit his PSA remained higher.  He was unable to do the MRI due to the claustrophobic conditions.  He is here to discuss other options     3/29/2022  Markers were placed    5/18/2022  He saw Dr. Marx for an urgent visit regarding dysuria.  He finished his radiation by Dr. Acevedo at [facility] on 6/09/2022.    06/14/2022  He is feeling better.  The dysuria is much improved.     10/06/2022  He feels well.  No dysuria.  IPSS Questionnaire (AUA-7):  Over the past month    1)  How often have you had a sensation of not emptying your bladder completely after you finish urinating?  0 - Not at all   2)  How often have you had to urinate again less than two hours after you  finished urinating? 1 - Less than 1 time in 5   3)  How often have you found you stopped and started again several times when you urinated?  1 - Less than 1 time in 5   4) How difficult have you found it to postpone urination?  0 - Not at all   5) How often have you had a weak urinary stream?  2 - Less than half the time   6) How often have you had to push or strain to begin urination?  0 - Not at all   7) How many times did you most typically get up to urinate from the time you went to bed until the time you got up in the morning?  2 - 2 times   Total score:  0-7 mildly symptomatic    8-19 moderately symptomatic    20-35 severely symptomatic        ACTIVE MEDICAL ISSUES:  Patient Active Problem List   Diagnosis    Right inguinal hernia    S/P right inguinal hernia repair    RBBB       ALLERGIES AND MEDICATIONS: updated and reviewed.  Review of patient's allergies indicates:  No Known Allergies  Current Outpatient Medications   Medication Sig    aspirin (ECOTRIN) 81 MG EC tablet Take 81 mg by mouth once daily.    CALCIUM ORAL Take by mouth once daily.    garlic Cap Take by mouth once daily.     multivitamin (THERAGRAN) per tablet Take 1 tablet by mouth once daily.    omega-3 fatty acids 1,000 mg Cap Take 1 g by mouth.    oxyCODONE-acetaminophen (PERCOCET) 5-325 mg per tablet Take 1 tablet by mouth every 4 (four) hours as needed for Pain. (Patient not taking: Reported on 10/6/2022)     Current Facility-Administered Medications   Medication    gentamicin injection 80 mg       Review of Systems   Constitutional:  Negative for activity change, fatigue, fever and unexpected weight change.   HENT:  Negative for congestion.    Eyes:  Negative for redness.   Respiratory:  Negative for chest tightness and shortness of breath.    Cardiovascular:  Negative for chest pain and leg swelling.   Gastrointestinal:  Negative for abdominal pain, constipation, diarrhea, nausea and vomiting.   Genitourinary:  Negative for dysuria, flank  pain, frequency, hematuria, penile pain, penile swelling, scrotal swelling, testicular pain and urgency.   Musculoskeletal:  Negative for arthralgias and back pain.   Neurological:  Negative for dizziness and light-headedness.   Psychiatric/Behavioral:  Negative for behavioral problems and confusion. The patient is not nervous/anxious.    All other systems reviewed and are negative.    Objective:      Vitals:    10/06/22 0828   Weight: 74.3 kg (163 lb 12.8 oz)     Physical Exam  Vitals and nursing note reviewed.   Constitutional:       Appearance: He is well-developed.   HENT:      Head: Normocephalic.   Eyes:      Conjunctiva/sclera: Conjunctivae normal.   Neck:      Thyroid: No thyromegaly.      Trachea: No tracheal deviation.   Cardiovascular:      Rate and Rhythm: Normal rate.      Heart sounds: Normal heart sounds.   Pulmonary:      Effort: Pulmonary effort is normal. No respiratory distress.      Breath sounds: Normal breath sounds. No wheezing.   Abdominal:      General: Bowel sounds are normal.      Palpations: Abdomen is soft.      Tenderness: There is no abdominal tenderness. There is no rebound.      Hernia: No hernia is present.   Musculoskeletal:         General: No tenderness. Normal range of motion.      Cervical back: Normal range of motion and neck supple.   Lymphadenopathy:      Cervical: No cervical adenopathy.   Skin:     General: Skin is warm and dry.      Findings: No erythema or rash.   Neurological:      Mental Status: He is alert and oriented to person, place, and time.   Psychiatric:         Behavior: Behavior normal.         Thought Content: Thought content normal.         Judgment: Judgment normal.       Urine dipstick shows negative for all components.  Micro exam: negative for WBC's or RBC's.    Component Ref Range & Units 1 mo ago   (8/15/22) 6 mo ago   (3/23/22) 8 mo ago   (1/18/22) 1 yr ago   (9/27/21) 7 yr ago   (10/2/15) 7 yr ago   (10/2/15)   PSA Diagnostic 0.00 - 4.00 ng/mL 2.6   11.5 High  CM  12.4 High  CM  12.4 High  CM  2.7 CM  2.7 CM    Comment: The testing method is a chemiluminescent microparticle immunoassay   manufactured by [employer] and performed on the [employer]   or   [employer] system. Values obtained with different assay manufacturers   for   methods may be different and cannot be used interchangeably.   PSA Expected levels:   Hormonal Therapy: <0.05 ng/ml   Prostatectomy: <0.01 ng/ml   Radiation Therapy: <1.00 ng/ml    Resulting Agency  [facility]           Narrative  Performed by: [facility]  PSA 3 months      Specimen Collected: 08/15/22 10:13 Last Resulted: 08/15/22 17:15             Assessment:       1. Prostate cancer    2. Nocturia more than twice per night    3. Incomplete emptying of bladder          Plan:       1. Prostate cancer  Doing well  - POCT urinalysis, dipstick or tablet reag  - Prostate Specific Antigen, Diagnostic; Future    2. Nocturia more than twice per night  Limit evening fluids    3. Incomplete emptying of bladder  stable          Follow up in about 3 months (around 1/6/2023) for Follow up, Review PSA.

## 2022-12-28 ENCOUNTER — LAB VISIT (OUTPATIENT)
Dept: LAB | Facility: HOSPITAL | Age: 67
End: 2022-12-28
Attending: UROLOGY
Payer: MEDICARE

## 2022-12-28 DIAGNOSIS — C61 PROSTATE CANCER: ICD-10-CM

## 2022-12-28 LAB — COMPLEXED PSA SERPL-MCNC: 1.6 NG/ML (ref 0–4)

## 2022-12-28 PROCEDURE — 84153 ASSAY OF PSA TOTAL: CPT | Performed by: UROLOGY

## 2022-12-28 PROCEDURE — 36415 COLL VENOUS BLD VENIPUNCTURE: CPT | Performed by: UROLOGY

## 2023-01-05 ENCOUNTER — OFFICE VISIT (OUTPATIENT)
Dept: UROLOGY | Facility: CLINIC | Age: 68
End: 2023-01-05
Payer: MEDICARE

## 2023-01-05 VITALS — HEIGHT: 72 IN | WEIGHT: 168.13 LBS | BODY MASS INDEX: 22.77 KG/M2

## 2023-01-05 DIAGNOSIS — C61 PROSTATE CANCER: Primary | ICD-10-CM

## 2023-01-05 DIAGNOSIS — R33.9 INCOMPLETE EMPTYING OF BLADDER: ICD-10-CM

## 2023-01-05 DIAGNOSIS — N52.9 ERECTILE DYSFUNCTION, UNSPECIFIED ERECTILE DYSFUNCTION TYPE: ICD-10-CM

## 2023-01-05 DIAGNOSIS — R35.1 NOCTURIA MORE THAN TWICE PER NIGHT: ICD-10-CM

## 2023-01-05 LAB
BILIRUB SERPL-MCNC: NEGATIVE MG/DL
BLOOD URINE, POC: NEGATIVE
COLOR, POC UA: YELLOW
GLUCOSE UR QL STRIP: NORMAL
KETONES UR QL STRIP: NEGATIVE
LEUKOCYTE ESTERASE URINE, POC: NEGATIVE
NITRITE, POC UA: NEGATIVE
PH, POC UA: 7
PROTEIN, POC: NEGATIVE
SPECIFIC GRAVITY, POC UA: 1015
UROBILINOGEN, POC UA: NORMAL

## 2023-01-05 PROCEDURE — 99999 PR PBB SHADOW E&M-EST. PATIENT-LVL III: ICD-10-PCS | Mod: PBBFAC,,, | Performed by: UROLOGY

## 2023-01-05 PROCEDURE — 99214 OFFICE O/P EST MOD 30 MIN: CPT | Mod: S$PBB,,, | Performed by: UROLOGY

## 2023-01-05 PROCEDURE — 81001 URINALYSIS AUTO W/SCOPE: CPT | Mod: PBBFAC | Performed by: UROLOGY

## 2023-01-05 PROCEDURE — 99999 PR PBB SHADOW E&M-EST. PATIENT-LVL III: CPT | Mod: PBBFAC,,, | Performed by: UROLOGY

## 2023-01-05 PROCEDURE — 99213 OFFICE O/P EST LOW 20 MIN: CPT | Mod: PBBFAC | Performed by: UROLOGY

## 2023-01-05 PROCEDURE — 99214 PR OFFICE/OUTPT VISIT, EST, LEVL IV, 30-39 MIN: ICD-10-PCS | Mod: S$PBB,,, | Performed by: UROLOGY

## 2023-01-05 RX ORDER — TADALAFIL 20 MG/1
20 TABLET ORAL DAILY PRN
Qty: 10 TABLET | Refills: 11 | Status: SHIPPED | OUTPATIENT
Start: 2023-01-05 | End: 2024-03-04

## 2023-01-05 NOTE — PROGRESS NOTES
Subjective:       Patient ID: Epifanio Guallpa is a 67 y.o. male The patient's last visit with me was on 10/6/2022.     Chief Complaint:   Chief Complaint   Patient presents with    Follow-up     Prostate Cancer   He underwent a prostate needle biopsy on 1/6/2020.  His biopsy was indicated due to: Elevated PSA.  Afterwards he experienced: Gross Hematuria and Blood in stool.  These symptoms have resolved.  His PSA prior to biopsy was 8.6.  His prostate size was 42 grams.  The ultrasound did not show a median lobe.  He currently does not have erectile dysfunction.  His pathology showed: prostate cancer.    Original Biopsy: 3+3=6 in 4/12 cores, Left Base/Mid    He met with Dr. Acevedo and agreed to active surveillance.  He has been doing active surveillance with Dr. Acevedo.  His last PSA was 8.0 on 1/22/2021.    He underwent a prostate needle biopsy on 5/6/2021.  His biopsy was indicated due to: Prostate Cancer.  Afterwards he experienced: Gross Hematuria and Blood in stool.  These symptoms have resolved.  His PSA prior to biopsy was 7.2.  His prostate size was 26 grams.  The ultrasound did not show a median lobe.  He currently does not have erectile dysfunction.  His pathology showed: Prostate Cancer.  Pathology: 3+4=7, 1 core 20%. 3+3=6, 15%    03/11/2022  Last visit his PSA remained higher.  He was unable to do the MRI due to the claustrophobic conditions.  He is here to discuss other options     3/29/2022  Markers were placed    5/18/2022  He saw Dr. Marx for an urgent visit regarding dysuria.  He finished his radiation by Dr. Acevedo at [facility] on 6/09/2022.    06/14/2022  He is feeling better.  The dysuria is much improved.     10/6/2022  He feels well.  No dysuria.  IPSS Questionnaire (AUA-7): 6    01/05/2023  He is doing well.  He has not been on Uroxatral for about a month and doing well.  IPSS Questionnaire (AUA-7):  Over the past month    1)  How often have you had a sensation of not emptying your bladder  completely after you finish urinating?  0 - Not at all   2)  How often have you had to urinate again less than two hours after you finished urinating? 1 - Less than 1 time in 5   3)  How often have you found you stopped and started again several times when you urinated?  1 - Less than 1 time in 5   4) How difficult have you found it to postpone urination?  0 - Not at all   5) How often have you had a weak urinary stream?  1 - Less than 1 time in 5   6) How often have you had to push or strain to begin urination?  1 - Less than 1 time in 5   7) How many times did you most typically get up to urinate from the time you went to bed until the time you got up in the morning?  2 - 2 times   Total score:  0-7 mildly symptomatic    8-19 moderately symptomatic    20-35 severely symptomatic        ACTIVE MEDICAL ISSUES:  Patient Active Problem List   Diagnosis    Right inguinal hernia    S/P right inguinal hernia repair    RBBB       ALLERGIES AND MEDICATIONS: updated and reviewed.  Review of patient's allergies indicates:  No Known Allergies  Current Outpatient Medications   Medication Sig    aspirin (ECOTRIN) 81 MG EC tablet Take 81 mg by mouth once daily.    CALCIUM ORAL Take by mouth once daily.    garlic Cap Take by mouth once daily.     multivitamin (THERAGRAN) per tablet Take 1 tablet by mouth once daily.    omega-3 fatty acids 1,000 mg Cap Take 1 g by mouth.    oxyCODONE-acetaminophen (PERCOCET) 5-325 mg per tablet Take 1 tablet by mouth every 4 (four) hours as needed for Pain. (Patient not taking: Reported on 10/6/2022)    tadalafiL (CIALIS) 20 MG Tab Take 1 tablet (20 mg total) by mouth daily as needed (erectile dysfunction).     Current Facility-Administered Medications   Medication    gentamicin injection 80 mg       Review of Systems   Constitutional:  Negative for activity change, fatigue, fever and unexpected weight change.   HENT:  Negative for congestion.    Eyes:  Negative for redness.   Respiratory:   Negative for chest tightness and shortness of breath.    Cardiovascular:  Negative for chest pain and leg swelling.   Gastrointestinal:  Negative for abdominal pain, constipation, diarrhea, nausea and vomiting.   Genitourinary:  Negative for dysuria, flank pain, frequency, hematuria, penile pain, penile swelling, scrotal swelling, testicular pain and urgency.   Musculoskeletal:  Negative for arthralgias and back pain.   Neurological:  Negative for dizziness and light-headedness.   Psychiatric/Behavioral:  Negative for behavioral problems and confusion. The patient is not nervous/anxious.    All other systems reviewed and are negative.    Objective:      Vitals:    01/05/23 0856   Weight: 76.2 kg (168 lb 1.6 oz)   Height: 6' (1.829 m)       Physical Exam  Vitals and nursing note reviewed.   Constitutional:       Appearance: He is well-developed.   HENT:      Head: Normocephalic.   Eyes:      Conjunctiva/sclera: Conjunctivae normal.   Neck:      Thyroid: No thyromegaly.      Trachea: No tracheal deviation.   Cardiovascular:      Rate and Rhythm: Normal rate.      Heart sounds: Normal heart sounds.   Pulmonary:      Effort: Pulmonary effort is normal. No respiratory distress.      Breath sounds: Normal breath sounds. No wheezing.   Abdominal:      General: Bowel sounds are normal.      Palpations: Abdomen is soft.      Tenderness: There is no abdominal tenderness. There is no rebound.      Hernia: No hernia is present.   Musculoskeletal:         General: No tenderness. Normal range of motion.      Cervical back: Normal range of motion and neck supple.   Lymphadenopathy:      Cervical: No cervical adenopathy.   Skin:     General: Skin is warm and dry.      Findings: No erythema or rash.   Neurological:      Mental Status: He is alert and oriented to person, place, and time.   Psychiatric:         Behavior: Behavior normal.         Thought Content: Thought content normal.         Judgment: Judgment normal.       Urine  dipstick shows negative for all components.  Micro exam: negative for WBC's or RBC's.    Component Ref Range & Units 8 d ago   (12/28/22) 4 mo ago   (8/15/22) 9 mo ago   (3/23/22) 11 mo ago   (1/18/22) 1 yr ago   (9/27/21) 7 yr ago   (10/2/15) 7 yr ago   (10/2/15)   PSA Diagnostic 0.00 - 4.00 ng/mL 1.6  2.6 CM  11.5 High  CM  12.4 High  CM  12.4 High  CM  2.7 CM  2.7 CM    Comment: The testing method is a chemiluminescent microparticle immunoassay   manufactured by [employer] and performed on the [employer]   or   [employer] system. Values obtained with different assay manufacturers   for   methods may be different and cannot be used interchangeably.   PSA Expected levels:   Hormonal Therapy: <0.05 ng/ml   Prostatectomy: <0.01 ng/ml   Radiation Therapy: <1.00 ng/ml    Resulting Agency  [facility]           Narrative  Performed by: [facility]  PSA 3 months      Specimen Collected: 12/28/22 10:19 Last Resulted: 12/28/22 19:16               Assessment:       1. Prostate cancer    2. Nocturia more than twice per night    3. Incomplete emptying of bladder    4. Erectile dysfunction, unspecified erectile dysfunction type            Plan:       1. Prostate cancer  - Prostate Specific Antigen, Diagnostic; Future  - POCT urinalysis, dipstick or tablet reag    2. Nocturia more than twice per night  Limit evening fluids      3. Incomplete emptying of bladder  Stable  Okay to not take Uroxatral     4. Erectile dysfunction, unspecified erectile dysfunction type    - tadalafiL (CIALIS) 20 MG Tab; Take 1 tablet (20 mg total) by mouth daily as needed (erectile dysfunction).  Dispense: 10 tablet; Refill: 11           Follow up in about 3 months (around 4/5/2023) for Follow up Established, Review PSA.

## 2023-03-30 ENCOUNTER — LAB VISIT (OUTPATIENT)
Dept: LAB | Facility: HOSPITAL | Age: 68
End: 2023-03-30
Attending: UROLOGY
Payer: MEDICARE

## 2023-03-30 DIAGNOSIS — R35.1 NOCTURIA MORE THAN TWICE PER NIGHT: ICD-10-CM

## 2023-03-30 LAB — COMPLEXED PSA SERPL-MCNC: 2.1 NG/ML (ref 0–4)

## 2023-03-30 PROCEDURE — 36415 COLL VENOUS BLD VENIPUNCTURE: CPT | Mod: PO | Performed by: UROLOGY

## 2023-03-30 PROCEDURE — 84153 ASSAY OF PSA TOTAL: CPT | Performed by: UROLOGY

## 2023-04-13 ENCOUNTER — PES CALL (OUTPATIENT)
Dept: ADMINISTRATIVE | Facility: CLINIC | Age: 68
End: 2023-04-13
Payer: MEDICARE

## 2023-04-27 ENCOUNTER — PES CALL (OUTPATIENT)
Dept: ADMINISTRATIVE | Facility: CLINIC | Age: 68
End: 2023-04-27
Payer: MEDICARE

## 2023-06-06 ENCOUNTER — PES CALL (OUTPATIENT)
Dept: ADMINISTRATIVE | Facility: CLINIC | Age: 68
End: 2023-06-06
Payer: MEDICARE

## 2023-06-27 ENCOUNTER — OFFICE VISIT (OUTPATIENT)
Dept: UROLOGY | Facility: CLINIC | Age: 68
End: 2023-06-27
Payer: MEDICARE

## 2023-06-27 VITALS — BODY MASS INDEX: 22.22 KG/M2 | WEIGHT: 163.81 LBS

## 2023-06-27 DIAGNOSIS — C61 PROSTATE CANCER: Primary | ICD-10-CM

## 2023-06-27 DIAGNOSIS — N52.8 OTHER MALE ERECTILE DYSFUNCTION: ICD-10-CM

## 2023-06-27 DIAGNOSIS — R33.9 INCOMPLETE EMPTYING OF BLADDER: ICD-10-CM

## 2023-06-27 DIAGNOSIS — R35.1 NOCTURIA MORE THAN TWICE PER NIGHT: ICD-10-CM

## 2023-06-27 PROCEDURE — 99213 OFFICE O/P EST LOW 20 MIN: CPT | Mod: PBBFAC | Performed by: UROLOGY

## 2023-06-27 PROCEDURE — 99214 PR OFFICE/OUTPT VISIT, EST, LEVL IV, 30-39 MIN: ICD-10-PCS | Mod: S$PBB,,, | Performed by: UROLOGY

## 2023-06-27 PROCEDURE — 99214 OFFICE O/P EST MOD 30 MIN: CPT | Mod: S$PBB,,, | Performed by: UROLOGY

## 2023-06-27 PROCEDURE — 99999 PR PBB SHADOW E&M-EST. PATIENT-LVL III: ICD-10-PCS | Mod: PBBFAC,,, | Performed by: UROLOGY

## 2023-06-27 PROCEDURE — 99999 PR PBB SHADOW E&M-EST. PATIENT-LVL III: CPT | Mod: PBBFAC,,, | Performed by: UROLOGY

## 2023-06-27 NOTE — PROGRESS NOTES
Subjective:       Patient ID: Epifanio Guallpa is a 68 y.o. male The patient's last visit with me was on 1/5/2023.     Chief Complaint:   No chief complaint on file.    Prostate Cancer   He underwent a prostate needle biopsy on 1/6/2020.  His biopsy was indicated due to: Elevated PSA.  Afterwards he experienced: Gross Hematuria and Blood in stool.  These symptoms have resolved.  His PSA prior to biopsy was 8.6.  His prostate size was 42 grams.  The ultrasound did not show a median lobe.  He currently does not have erectile dysfunction.  His pathology showed: prostate cancer.    Original Biopsy: 3+3=6 in 4/12 cores, Left Base/Mid    He met with Dr. Acevedo and agreed to active surveillance.  He has been doing active surveillance with Dr. Acevedo.  His last PSA was 8.0 on 1/22/2021.    He underwent a prostate needle biopsy on 5/6/2021.  His biopsy was indicated due to: Prostate Cancer.  Afterwards he experienced: Gross Hematuria and Blood in stool.  These symptoms have resolved.  His PSA prior to biopsy was 7.2.  His prostate size was 26 grams.  The ultrasound did not show a median lobe.  He currently does not have erectile dysfunction.  His pathology showed: Prostate Cancer.  Pathology: 3+4=7, 1 core 20%. 3+3=6, 15%    03/11/2022  Last visit his PSA remained higher.  He was unable to do the MRI due to the claustrophobic conditions.  He is here to discuss other options     3/29/2022  Markers were placed    5/18/2022  He saw Dr. Marx for an urgent visit regarding dysuria.  He finished his radiation by Dr. Acevedo at [facility] on 6/09/2022.    06/14/2022  He is feeling better.  The dysuria is much improved.     10/6/2022  He feels well.  No dysuria.  IPSS Questionnaire (AUA-7): 6    1/5/2022  He is doing well.  He has not been on Uroxatral for about a month and doing well.    06/27/2023  He feels well.  He denies hematuria or dysuria.  Cialis works.  IPSS Questionnaire (AUA-7):  Over the past month    1)  How often have  you had a sensation of not emptying your bladder completely after you finish urinating?  1 - Less than 1 time in 5   2)  How often have you had to urinate again less than two hours after you finished urinating? 1 - Less than 1 time in 5   3)  How often have you found you stopped and started again several times when you urinated?  1 - Less than 1 time in 5   4) How difficult have you found it to postpone urination?  0 - Not at all   5) How often have you had a weak urinary stream?  2 - Less than half the time   6) How often have you had to push or strain to begin urination?  1 - Less than 1 time in 5   7) How many times did you most typically get up to urinate from the time you went to bed until the time you got up in the morning?  2 - 2 times   Total score:  0-7 mildly symptomatic    8-19 moderately symptomatic  8/2    20-35 severely symptomatic        ACTIVE MEDICAL ISSUES:  Patient Active Problem List   Diagnosis    Right inguinal hernia    S/P right inguinal hernia repair    RBBB       ALLERGIES AND MEDICATIONS: updated and reviewed.  Review of patient's allergies indicates:  No Known Allergies  Current Outpatient Medications   Medication Sig    aspirin (ECOTRIN) 81 MG EC tablet Take 81 mg by mouth once daily.    CALCIUM ORAL Take by mouth once daily.    garlic Cap Take by mouth once daily.     multivitamin (THERAGRAN) per tablet Take 1 tablet by mouth once daily.    omega-3 fatty acids 1,000 mg Cap Take 1 g by mouth.    oxyCODONE-acetaminophen (PERCOCET) 5-325 mg per tablet Take 1 tablet by mouth every 4 (four) hours as needed for Pain. (Patient not taking: Reported on 10/6/2022)    tadalafiL (CIALIS) 20 MG Tab Take 1 tablet (20 mg total) by mouth daily as needed (erectile dysfunction).     Current Facility-Administered Medications   Medication    gentamicin injection 80 mg       Review of Systems   Constitutional:  Negative for activity change, fatigue, fever and unexpected weight change.   HENT:  Negative for  congestion.    Eyes:  Negative for redness.   Respiratory:  Negative for chest tightness and shortness of breath.    Cardiovascular:  Negative for chest pain and leg swelling.   Gastrointestinal:  Negative for abdominal pain, constipation, diarrhea, nausea and vomiting.   Genitourinary:  Negative for dysuria, flank pain, frequency, hematuria, penile pain, penile swelling, scrotal swelling, testicular pain and urgency.   Musculoskeletal:  Negative for arthralgias and back pain.   Neurological:  Negative for dizziness and light-headedness.   Psychiatric/Behavioral:  Negative for behavioral problems and confusion. The patient is not nervous/anxious.    All other systems reviewed and are negative.    Objective:      Vitals:    06/27/23 1421   Weight: 74.3 kg (163 lb 12.8 oz)         Physical Exam  Vitals and nursing note reviewed.   Constitutional:       Appearance: He is well-developed.   HENT:      Head: Normocephalic.   Eyes:      Conjunctiva/sclera: Conjunctivae normal.   Neck:      Thyroid: No thyromegaly.      Trachea: No tracheal deviation.   Cardiovascular:      Rate and Rhythm: Normal rate.      Heart sounds: Normal heart sounds.   Pulmonary:      Effort: Pulmonary effort is normal. No respiratory distress.      Breath sounds: Normal breath sounds. No wheezing.   Abdominal:      General: Bowel sounds are normal.      Palpations: Abdomen is soft.      Tenderness: There is no abdominal tenderness. There is no rebound.      Hernia: No hernia is present.   Musculoskeletal:         General: No tenderness. Normal range of motion.      Cervical back: Normal range of motion and neck supple.   Lymphadenopathy:      Cervical: No cervical adenopathy.   Skin:     General: Skin is warm and dry.      Findings: No erythema or rash.   Neurological:      Mental Status: He is alert and oriented to person, place, and time.   Psychiatric:         Behavior: Behavior normal.         Thought Content: Thought content normal.          Judgment: Judgment normal.       Urine dipstick shows negative for all components.  Micro exam: negative for WBC's or RBC's.    Component Ref Range & Units 2 mo ago  (3/30/23) 6 mo ago  (12/28/22) 10 mo ago  (8/15/22) 1 yr ago  (3/23/22) 1 yr ago  (1/18/22) 1 yr ago  (9/27/21) 7 yr ago  (10/2/15) 7 yr ago  (10/2/15)   PSA Diagnostic 0.00 - 4.00 ng/mL 2.1  1.6 CM  2.6 CM  11.5 High  CM  12.4 High  CM  12.4 High  CM  2.7 CM  2.7 CM    Comment: The testing method is a chemiluminescent microparticle immunoassay   manufactured by [employer] and performed on the [employer]   or   [employer] system. Values obtained with different assay manufacturers   for   methods may be different and cannot be used interchangeably.   PSA Expected levels:   Hormonal Therapy: <0.05 ng/ml   Prostatectomy: <0.01 ng/ml   Radiation Therapy: <1.00 ng/ml    Resulting Agency  [facility]           Narrative  Performed by: [facility]  PSA 3 months      Specimen Collected: 03/30/23 09:30 Last Resulted: 03/30/23 19:21               Assessment:       1. Prostate cancer    2. Nocturia more than twice per night    3. Incomplete emptying of bladder    4. Other male erectile dysfunction              Plan:       1. Prostate cancer  Doing well  - Prostate Specific Antigen, Diagnostic; Future  - Prostate Specific Antigen, Diagnostic; Future    2. Nocturia more than twice per night  Limit evening fluids    3. Incomplete emptying of bladder      4. Other male erectile dysfunction  Cialis           Follow up in about 3 months (around 9/27/2023) for Follow up Established, Review PSA.

## 2023-06-28 ENCOUNTER — LAB VISIT (OUTPATIENT)
Dept: LAB | Facility: HOSPITAL | Age: 68
End: 2023-06-28
Attending: UROLOGY
Payer: MEDICARE

## 2023-06-28 DIAGNOSIS — C61 PROSTATE CANCER: ICD-10-CM

## 2023-06-28 LAB — COMPLEXED PSA SERPL-MCNC: 1.3 NG/ML (ref 0–4)

## 2023-06-28 PROCEDURE — 36415 COLL VENOUS BLD VENIPUNCTURE: CPT | Mod: PO | Performed by: UROLOGY

## 2023-06-28 PROCEDURE — 84153 ASSAY OF PSA TOTAL: CPT | Performed by: UROLOGY

## 2023-06-29 ENCOUNTER — TELEPHONE (OUTPATIENT)
Dept: UROLOGY | Facility: CLINIC | Age: 68
End: 2023-06-29
Payer: MEDICARE

## 2023-06-29 NOTE — TELEPHONE ENCOUNTER
Pt notified of PSA  results      ----- Message from Rick Sanchez MD sent at 6/29/2023  7:51 AM CDT -----  PSA is good at 1.3

## 2023-08-03 ENCOUNTER — PES CALL (OUTPATIENT)
Dept: ADMINISTRATIVE | Facility: CLINIC | Age: 68
End: 2023-08-03
Payer: MEDICARE

## 2023-10-02 ENCOUNTER — LAB VISIT (OUTPATIENT)
Dept: LAB | Facility: HOSPITAL | Age: 68
End: 2023-10-02
Attending: UROLOGY
Payer: MEDICARE

## 2023-10-02 DIAGNOSIS — C61 PROSTATE CANCER: ICD-10-CM

## 2023-10-02 LAB — COMPLEXED PSA SERPL-MCNC: 2 NG/ML (ref 0–4)

## 2023-10-02 PROCEDURE — 84153 ASSAY OF PSA TOTAL: CPT | Performed by: UROLOGY

## 2023-10-02 PROCEDURE — 36415 COLL VENOUS BLD VENIPUNCTURE: CPT | Mod: PO | Performed by: UROLOGY

## 2023-10-05 ENCOUNTER — OFFICE VISIT (OUTPATIENT)
Dept: UROLOGY | Facility: CLINIC | Age: 68
End: 2023-10-05
Payer: MEDICARE

## 2023-10-05 VITALS — BODY MASS INDEX: 22.16 KG/M2 | WEIGHT: 163.38 LBS

## 2023-10-05 DIAGNOSIS — R35.1 NOCTURIA MORE THAN TWICE PER NIGHT: ICD-10-CM

## 2023-10-05 DIAGNOSIS — R33.9 INCOMPLETE EMPTYING OF BLADDER: ICD-10-CM

## 2023-10-05 DIAGNOSIS — C61 PROSTATE CANCER: Primary | ICD-10-CM

## 2023-10-05 PROCEDURE — 99214 PR OFFICE/OUTPT VISIT, EST, LEVL IV, 30-39 MIN: ICD-10-PCS | Mod: S$PBB,,, | Performed by: UROLOGY

## 2023-10-05 PROCEDURE — 99999 PR PBB SHADOW E&M-EST. PATIENT-LVL III: CPT | Mod: PBBFAC,,, | Performed by: UROLOGY

## 2023-10-05 PROCEDURE — 99999 PR PBB SHADOW E&M-EST. PATIENT-LVL III: ICD-10-PCS | Mod: PBBFAC,,, | Performed by: UROLOGY

## 2023-10-05 PROCEDURE — 99213 OFFICE O/P EST LOW 20 MIN: CPT | Mod: PBBFAC | Performed by: UROLOGY

## 2023-10-05 PROCEDURE — 99214 OFFICE O/P EST MOD 30 MIN: CPT | Mod: S$PBB,,, | Performed by: UROLOGY

## 2023-10-05 RX ORDER — ALFUZOSIN HYDROCHLORIDE 10 MG/1
10 TABLET, EXTENDED RELEASE ORAL DAILY
Qty: 90 TABLET | Refills: 3 | Status: SHIPPED | OUTPATIENT
Start: 2023-10-05 | End: 2024-09-29

## 2023-10-05 NOTE — PROGRESS NOTES
Subjective:       Patient ID: Epifanio Guallpa is a 68 y.o. male The patient's last visit with me was on 6/27/2023.     Chief Complaint:   Chief Complaint   Patient presents with    Results     Prostate Cancer   He underwent a prostate needle biopsy on 1/6/2020.  His biopsy was indicated due to: Elevated PSA.  Afterwards he experienced: Gross Hematuria and Blood in stool.  These symptoms have resolved.  His PSA prior to biopsy was 8.6.  His prostate size was 42 grams.  The ultrasound did not show a median lobe.  He currently does not have erectile dysfunction.  His pathology showed: prostate cancer.    Original Biopsy: 3+3=6 in 4/12 cores, Left Base/Mid    He met with Dr. Acevedo and agreed to active surveillance.  He has been doing active surveillance with Dr. Acevedo.  His last PSA was 8.0 on 1/22/2021.    He underwent a prostate needle biopsy on 5/6/2021.  His biopsy was indicated due to: Prostate Cancer.  Afterwards he experienced: Gross Hematuria and Blood in stool.  These symptoms have resolved.  His PSA prior to biopsy was 7.2.  His prostate size was 26 grams.  The ultrasound did not show a median lobe.  He currently does not have erectile dysfunction.  His pathology showed: Prostate Cancer.  Pathology: 3+4=7, 1 core 20%. 3+3=6, 15%    03/11/2022  Last visit his PSA remained higher.  He was unable to do the MRI due to the claustrophobic conditions.  He is here to discuss other options     3/29/2022  Markers were placed    5/18/2022  He saw Dr. Marx for an urgent visit regarding dysuria.  He finished his radiation by Dr. Acevedo at [facility] on 6/09/2022.    06/14/2022  He is feeling better.  The dysuria is much improved.     10/6/2022  He feels well.  No dysuria.  IPSS Questionnaire (AUA-7): 6    1/5/2022  He is doing well.  He has not been on Uroxatral for about a month and doing well.    6/27/2023  He feels well.  He denies hematuria or dysuria.  Cialis works.    10/05/2023  He feels okay, he would like to  restart Uroxatral.  His stream is not was strong.          ACTIVE MEDICAL ISSUES:  Patient Active Problem List   Diagnosis    Right inguinal hernia    S/P right inguinal hernia repair    RBBB       ALLERGIES AND MEDICATIONS: updated and reviewed.  Review of patient's allergies indicates:  No Known Allergies  Current Outpatient Medications   Medication Sig    aspirin (ECOTRIN) 81 MG EC tablet Take 81 mg by mouth once daily.    CALCIUM ORAL Take by mouth once daily.    garlic Cap Take by mouth once daily.     multivitamin (THERAGRAN) per tablet Take 1 tablet by mouth once daily.    omega-3 fatty acids 1,000 mg Cap Take 1 g by mouth.    alfuzosin (UROXATRAL) 10 mg Tb24 Take 1 tablet (10 mg total) by mouth once daily.    oxyCODONE-acetaminophen (PERCOCET) 5-325 mg per tablet Take 1 tablet by mouth every 4 (four) hours as needed for Pain. (Patient not taking: Reported on 10/6/2022)    tadalafiL (CIALIS) 20 MG Tab Take 1 tablet (20 mg total) by mouth daily as needed (erectile dysfunction).     Current Facility-Administered Medications   Medication    gentamicin injection 80 mg       Review of Systems   Constitutional:  Negative for activity change, fatigue, fever and unexpected weight change.   HENT:  Negative for congestion.    Eyes:  Negative for redness.   Respiratory:  Negative for chest tightness and shortness of breath.    Cardiovascular:  Negative for chest pain and leg swelling.   Gastrointestinal:  Negative for abdominal pain, constipation, diarrhea, nausea and vomiting.   Genitourinary:  Negative for dysuria, flank pain, frequency, hematuria, penile pain, penile swelling, scrotal swelling, testicular pain and urgency.   Musculoskeletal:  Negative for arthralgias and back pain.   Neurological:  Negative for dizziness and light-headedness.   Psychiatric/Behavioral:  Negative for behavioral problems and confusion. The patient is not nervous/anxious.    All other systems reviewed and are negative.      Objective:       Vitals:    10/05/23 0853   Weight: 74.1 kg (163 lb 5.8 oz)           Physical Exam  Vitals and nursing note reviewed.   Constitutional:       Appearance: He is well-developed.   HENT:      Head: Normocephalic.   Eyes:      Conjunctiva/sclera: Conjunctivae normal.   Neck:      Thyroid: No thyromegaly.      Trachea: No tracheal deviation.   Cardiovascular:      Rate and Rhythm: Normal rate.      Heart sounds: Normal heart sounds.   Pulmonary:      Effort: Pulmonary effort is normal. No respiratory distress.      Breath sounds: Normal breath sounds. No wheezing.   Abdominal:      General: Bowel sounds are normal.      Palpations: Abdomen is soft.      Tenderness: There is no abdominal tenderness. There is no rebound.      Hernia: No hernia is present.   Musculoskeletal:         General: No tenderness. Normal range of motion.      Cervical back: Normal range of motion and neck supple.   Lymphadenopathy:      Cervical: No cervical adenopathy.   Skin:     General: Skin is warm and dry.      Findings: No erythema or rash.   Neurological:      Mental Status: He is alert and oriented to person, place, and time.   Psychiatric:         Behavior: Behavior normal.         Thought Content: Thought content normal.         Judgment: Judgment normal.         Urine dipstick shows negative for all components.  Micro exam: negative for WBC's or RBC's.    Component Ref Range & Units 3 d ago  (10/2/23) 3 mo ago  (6/28/23) 6 mo ago  (3/30/23) 9 mo ago  (12/28/22) 1 yr ago  (8/15/22) 1 yr ago  (3/23/22) 1 yr ago  (1/18/22)   PSA Diagnostic 0.00 - 4.00 ng/mL 2.0  1.3 CM  2.1 CM  1.6 CM  2.6 CM  11.5 High  CM  12.4 High  CM    Comment: The testing method is a chemiluminescent microparticle immunoassay   manufactured by [employer] and performed on the [employer]   or   [employer] system. Values obtained with different assay manufacturers   for   methods may be different and cannot be used interchangeably.   PSA Expected levels:    Hormonal Therapy: <0.05 ng/ml   Prostatectomy: <0.01 ng/ml   Radiation Therapy: <1.00 ng/ml    Resulting Agency  [facility]              Narrative  Performed by: [facility]  PSA 3 months      Specimen Collected: 10/02/23 07:22 Last Resulted: 10/02/23 12:30               Assessment:       1. Prostate cancer    2. Nocturia more than twice per night    3. Incomplete emptying of bladder                Plan:       1. Prostate cancer  stable  - Prostate Specific Antigen, Diagnostic; Future    2. Nocturia more than twice per night  Restart Uroxatral  - alfuzosin (UROXATRAL) 10 mg Tb24; Take 1 tablet (10 mg total) by mouth once daily.  Dispense: 90 tablet; Refill: 3    3. Incomplete emptying of bladder  As above           Follow up in about 3 months (around 1/5/2024) for Follow up Established.

## 2023-11-16 ENCOUNTER — OFFICE VISIT (OUTPATIENT)
Dept: FAMILY MEDICINE | Facility: CLINIC | Age: 68
End: 2023-11-16
Payer: MEDICARE

## 2023-11-16 VITALS
DIASTOLIC BLOOD PRESSURE: 78 MMHG | OXYGEN SATURATION: 98 % | SYSTOLIC BLOOD PRESSURE: 110 MMHG | RESPIRATION RATE: 18 BRPM | TEMPERATURE: 99 F | HEART RATE: 67 BPM | HEIGHT: 72 IN | WEIGHT: 164.25 LBS | BODY MASS INDEX: 22.25 KG/M2

## 2023-11-16 DIAGNOSIS — Z23 NEED FOR PNEUMOCOCCAL VACCINE: ICD-10-CM

## 2023-11-16 DIAGNOSIS — Z12.12 ENCOUNTER FOR COLORECTAL CANCER SCREENING: ICD-10-CM

## 2023-11-16 DIAGNOSIS — Z23 NEEDS FLU SHOT: ICD-10-CM

## 2023-11-16 DIAGNOSIS — M67.432 GANGLION CYST OF DORSUM OF LEFT WRIST: Primary | ICD-10-CM

## 2023-11-16 DIAGNOSIS — Z12.11 ENCOUNTER FOR COLORECTAL CANCER SCREENING: ICD-10-CM

## 2023-11-16 PROCEDURE — 99214 OFFICE O/P EST MOD 30 MIN: CPT | Mod: PBBFAC,PO,25 | Performed by: PHYSICIAN ASSISTANT

## 2023-11-16 PROCEDURE — 90677 PCV20 VACCINE IM: CPT | Mod: PBBFAC,PO

## 2023-11-16 PROCEDURE — 99999 PR PBB SHADOW E&M-EST. PATIENT-LVL IV: ICD-10-PCS | Mod: PBBFAC,,, | Performed by: PHYSICIAN ASSISTANT

## 2023-11-16 PROCEDURE — 99999PBSHW PNEUMOCOCCAL CONJUGATE VACCINE 20-VALENT: Mod: PBBFAC,,,

## 2023-11-16 PROCEDURE — G0008 ADMIN INFLUENZA VIRUS VAC: HCPCS | Mod: PBBFAC,PO

## 2023-11-16 PROCEDURE — 99214 PR OFFICE/OUTPT VISIT, EST, LEVL IV, 30-39 MIN: ICD-10-PCS | Mod: S$PBB,,, | Performed by: PHYSICIAN ASSISTANT

## 2023-11-16 PROCEDURE — 99999 PR PBB SHADOW E&M-EST. PATIENT-LVL IV: CPT | Mod: PBBFAC,,, | Performed by: PHYSICIAN ASSISTANT

## 2023-11-16 PROCEDURE — 99214 OFFICE O/P EST MOD 30 MIN: CPT | Mod: S$PBB,,, | Performed by: PHYSICIAN ASSISTANT

## 2023-11-16 PROCEDURE — 99999PBSHW PNEUMOCOCCAL CONJUGATE VACCINE 20-VALENT: ICD-10-PCS | Mod: PBBFAC,,,

## 2023-11-16 PROCEDURE — 99999PBSHW FLU VACCINE - QUADRIVALENT - ADJUVANTED: Mod: PBBFAC,,,

## 2023-11-16 NOTE — PROGRESS NOTES
Health Maintenance Due   Topic     Pneumococcal Vaccines (Age 65+) (1 - PCV)     Shingles Vaccine (1 of 2) Hx of chickenpox. Notified pt can get vaccine at pharmacy.    RSV Vaccine (Age 60+) (1 - 1-dose 60+ series) Not offered at this Facility    Colorectal Cancer Screening      Influenza Vaccine (1)     COVID-19 Vaccine (3 - 2023-24 season) Not offered at this Facility

## 2023-11-16 NOTE — PROGRESS NOTES
Pt tolerated flu and pneumonia vaccine to left deltoid without difficulty; no adverse reaction noted; VIS given  [employer] was given to patient on 11/16/2023 9:09 AM

## 2023-11-16 NOTE — PROGRESS NOTES
Subjective     Patient ID: Epifanio Guallpa is a 68 y.o. male.    Chief Complaint: Mass (L wrist)    HPI:  67 y/o male presents to clinic with left wrist mass. Patient states that he first noticed it on Sunday night while watching television and it has looked the same since then. The lump and surrounding area has no pain, redness, or burning skin. No numbness or tingling in fingers or hand. Patient denies recent injuries, new activities, and new medications. He states that his mother had a similar lump on the same area of her wrist about 30 years ago.       Review of Systems   Constitutional:  Negative for activity change, appetite change, chills, fatigue and fever.   HENT:  Negative for hearing loss, rhinorrhea and sore throat.    Eyes:  Negative for visual disturbance.   Respiratory:  Negative for cough, shortness of breath and wheezing.    Cardiovascular:  Negative for chest pain and leg swelling.   Gastrointestinal:  Negative for blood in stool, constipation, diarrhea, nausea and vomiting.   Genitourinary:  Negative for difficulty urinating, dysuria and hematuria.   Musculoskeletal:  Negative for arthralgias, back pain and myalgias.        Lump on left wrist   Allergic/Immunologic: Negative for environmental allergies and food allergies.   Neurological:  Negative for light-headedness, numbness and headaches.          Objective     Physical Exam  Constitutional:       Appearance: Normal appearance. He is normal weight.   HENT:      Head: Normocephalic and atraumatic.   Eyes:      Extraocular Movements: Extraocular movements intact.      Pupils: Pupils are equal, round, and reactive to light.   Cardiovascular:      Rate and Rhythm: Normal rate and regular rhythm.   Pulmonary:      Effort: Pulmonary effort is normal.      Breath sounds: Normal breath sounds.   Musculoskeletal:         General: No tenderness or signs of injury.      Cervical back: Normal range of motion and neck supple.      Comments: 2 cm lump  on ventral side of left radial wrist   Skin:     General: Skin is warm and dry.      Findings: No bruising, erythema or rash.   Neurological:      Mental Status: He is alert.   Psychiatric:         Mood and Affect: Mood normal.         Behavior: Behavior normal.         Thought Content: Thought content normal.         Judgment: Judgment normal.        Assessment and Plan     1. Ganglion cyst of dorsum of left wrist        -     Discussed can refer to ortho for aspiration otherwise continue to monitor. Pt chooses to monitor for now. If becomes larger or painful will notify me    2. Encounter for colorectal cancer screening  -     Fecal Immunochemical Test (iFOBT); Future; Expected date: 11/16/2023    3. Needs flu shot  -     Influenza - Quadrivalent (Adjuvanted)    4. Need for pneumococcal vaccine  -     (In Office Administered) Pneumococcal Conjugate Vaccine (20 Valent) (IM) (Preferred)     I hereby acknowledge that I am relying upon documentation authored by a PA student working under my supervision and further I hereby attest that I have verified the student documentation or findings by personally re-performing the physical exam and medical decision making activities of the Evaluation and Management service to be billed.  Keri Maldonado PAC      No follow-ups on file.

## 2023-11-21 ENCOUNTER — LAB VISIT (OUTPATIENT)
Dept: LAB | Facility: HOSPITAL | Age: 68
End: 2023-11-21
Payer: MEDICARE

## 2023-11-21 DIAGNOSIS — Z12.12 ENCOUNTER FOR COLORECTAL CANCER SCREENING: ICD-10-CM

## 2023-11-21 DIAGNOSIS — Z12.11 ENCOUNTER FOR COLORECTAL CANCER SCREENING: ICD-10-CM

## 2023-11-21 LAB — HEMOCCULT STL QL IA: NEGATIVE

## 2023-11-21 PROCEDURE — 82274 ASSAY TEST FOR BLOOD FECAL: CPT | Performed by: PHYSICIAN ASSISTANT

## 2024-01-05 ENCOUNTER — LAB VISIT (OUTPATIENT)
Dept: LAB | Facility: HOSPITAL | Age: 69
End: 2024-01-05
Attending: UROLOGY
Payer: MEDICARE

## 2024-01-05 DIAGNOSIS — C61 PROSTATE CANCER: ICD-10-CM

## 2024-01-05 LAB — COMPLEXED PSA SERPL-MCNC: 1.3 NG/ML (ref 0–4)

## 2024-01-05 PROCEDURE — 84153 ASSAY OF PSA TOTAL: CPT | Performed by: UROLOGY

## 2024-01-05 PROCEDURE — 36415 COLL VENOUS BLD VENIPUNCTURE: CPT | Performed by: UROLOGY

## 2024-01-12 ENCOUNTER — OFFICE VISIT (OUTPATIENT)
Dept: UROLOGY | Facility: CLINIC | Age: 69
End: 2024-01-12
Payer: MEDICARE

## 2024-01-12 DIAGNOSIS — N52.8 OTHER MALE ERECTILE DYSFUNCTION: ICD-10-CM

## 2024-01-12 DIAGNOSIS — R35.1 NOCTURIA MORE THAN TWICE PER NIGHT: ICD-10-CM

## 2024-01-12 DIAGNOSIS — C61 PROSTATE CANCER: Primary | ICD-10-CM

## 2024-01-12 DIAGNOSIS — R33.9 INCOMPLETE EMPTYING OF BLADDER: ICD-10-CM

## 2024-01-12 PROCEDURE — 99213 OFFICE O/P EST LOW 20 MIN: CPT | Mod: S$PBB,,, | Performed by: UROLOGY

## 2024-01-12 PROCEDURE — 99212 OFFICE O/P EST SF 10 MIN: CPT | Mod: PBBFAC | Performed by: UROLOGY

## 2024-01-12 PROCEDURE — 99999 PR PBB SHADOW E&M-EST. PATIENT-LVL II: CPT | Mod: PBBFAC,,, | Performed by: UROLOGY

## 2024-01-12 NOTE — PROGRESS NOTES
Subjective:       Patient ID: Epifanio Guallpa is a 68 y.o. male The patient's last visit with me was on 10/5/2023.     Chief Complaint:   No chief complaint on file.    Prostate Cancer   He underwent a prostate needle biopsy on 1/6/2020.  His biopsy was indicated due to: Elevated PSA.  Afterwards he experienced: Gross Hematuria and Blood in stool.  These symptoms have resolved.  His PSA prior to biopsy was 8.6.  His prostate size was 42 grams.  The ultrasound did not show a median lobe.  He currently does not have erectile dysfunction.  His pathology showed: prostate cancer.    Original Biopsy: 3+3=6 in 4/12 cores, Left Base/Mid    He met with Dr. Acevedo and agreed to active surveillance.  He has been doing active surveillance with Dr. Acevedo.  His last PSA was 8.0 on 1/22/2021.    He underwent a prostate needle biopsy on 5/6/2021.  His biopsy was indicated due to: Prostate Cancer.  Afterwards he experienced: Gross Hematuria and Blood in stool.  These symptoms have resolved.  His PSA prior to biopsy was 7.2.  His prostate size was 26 grams.  The ultrasound did not show a median lobe.  He currently does not have erectile dysfunction.  His pathology showed: Prostate Cancer.  Pathology: 3+4=7, 1 core 20%. 3+3=6, 15%    03/11/2022  Last visit his PSA remained higher.  He was unable to do the MRI due to the claustrophobic conditions.  He is here to discuss other options     3/29/2022  Markers were placed    5/18/2022  He saw Dr. Marx for an urgent visit regarding dysuria.  He finished his radiation by Dr. Acevedo at [facility] on 6/09/2022.    06/14/2022  He is feeling better.  The dysuria is much improved.     10/6/2022  He feels well.  No dysuria.  IPSS Questionnaire (AUA-7): 6    1/5/2022  He is doing well.  He has not been on Uroxatral for about a month and doing well.    6/27/2023  He feels well.  He denies hematuria or dysuria.  Cialis works.    10/5/2023  He feels okay, he would like to restart Uroxatral.  His  stream is not was strong.    01/12/2024  He is back with a new PSA.  He is taking Uroxatral.  He is not sure how well his stream is improved with the medication.  He does not want a procedure.          ACTIVE MEDICAL ISSUES:  Patient Active Problem List   Diagnosis    Right inguinal hernia    S/P right inguinal hernia repair    RBBB       ALLERGIES AND MEDICATIONS: updated and reviewed.  Review of patient's allergies indicates:  No Known Allergies  Current Outpatient Medications   Medication Sig    alfuzosin (UROXATRAL) 10 mg Tb24 Take 1 tablet (10 mg total) by mouth once daily.    aspirin (ECOTRIN) 81 MG EC tablet Take 81 mg by mouth once daily.    CALCIUM ORAL Take by mouth once daily.    garlic Cap Take by mouth once daily.     multivitamin (THERAGRAN) per tablet Take 1 tablet by mouth once daily.    omega-3 fatty acids 1,000 mg Cap Take 1 g by mouth.    oxyCODONE-acetaminophen (PERCOCET) 5-325 mg per tablet Take 1 tablet by mouth every 4 (four) hours as needed for Pain. (Patient not taking: Reported on 10/6/2022)    tadalafiL (CIALIS) 20 MG Tab Take 1 tablet (20 mg total) by mouth daily as needed (erectile dysfunction). (Patient not taking: Reported on 11/16/2023)     Current Facility-Administered Medications   Medication    gentamicin injection 80 mg       Review of Systems   Constitutional:  Negative for activity change, fatigue, fever and unexpected weight change.   HENT:  Negative for congestion.    Eyes:  Negative for redness.   Respiratory:  Negative for chest tightness and shortness of breath.    Cardiovascular:  Negative for chest pain and leg swelling.   Gastrointestinal:  Negative for abdominal pain, constipation, diarrhea, nausea and vomiting.   Genitourinary:  Negative for dysuria, flank pain, frequency, hematuria, penile pain, penile swelling, scrotal swelling, testicular pain and urgency.   Musculoskeletal:  Negative for arthralgias and back pain.   Neurological:  Negative for dizziness and  light-headedness.   Psychiatric/Behavioral:  Negative for behavioral problems and confusion. The patient is not nervous/anxious.    All other systems reviewed and are negative.      Objective:      There were no vitals filed for this visit.          Physical Exam  Vitals and nursing note reviewed.   Constitutional:       Appearance: He is well-developed.   HENT:      Head: Normocephalic.   Eyes:      Conjunctiva/sclera: Conjunctivae normal.   Neck:      Thyroid: No thyromegaly.      Trachea: No tracheal deviation.   Cardiovascular:      Rate and Rhythm: Normal rate.      Heart sounds: Normal heart sounds.   Pulmonary:      Effort: Pulmonary effort is normal. No respiratory distress.      Breath sounds: Normal breath sounds. No wheezing.   Abdominal:      General: Bowel sounds are normal.      Palpations: Abdomen is soft.      Tenderness: There is no abdominal tenderness. There is no rebound.      Hernia: No hernia is present.   Musculoskeletal:         General: No tenderness. Normal range of motion.      Cervical back: Normal range of motion and neck supple.   Lymphadenopathy:      Cervical: No cervical adenopathy.   Skin:     General: Skin is warm and dry.      Findings: No erythema or rash.   Neurological:      Mental Status: He is alert and oriented to person, place, and time.   Psychiatric:         Behavior: Behavior normal.         Thought Content: Thought content normal.         Judgment: Judgment normal.         Urine dipstick shows negative for all components.  Micro exam: negative for WBC's or RBC's.               Component Ref Range & Units 7 d ago  (1/5/24) 3 mo ago  (10/2/23) 6 mo ago  (6/28/23) 9 mo ago  (3/30/23) 1 yr ago  (12/28/22) 1 yr ago  (8/15/22) 1 yr ago  (3/23/22)   PSA Diagnostic 0.00 - 4.00 ng/mL 1.3 2.0 CM 1.3 CM 2.1 CM 1.6 CM 2.6 CM 11.5 High  CM   Comment: The testing method is a chemiluminescent microparticle immunoassay  manufactured by [employer] and performed on the    or  [employer] system. Values obtained with different assay manufacturers  for  methods may be different and cannot be used interchangeably.  PSA Expected levels:  Hormonal Therapy: <0.05 ng/ml  Prostatectomy: <0.01 ng/ml  Radiation Therapy: <1.00 ng/ml   Resulting Agency  [facility]              Narrative  Performed by: [facility]  PSA 3 months      Specimen Collected: 01/05/24 09:13 CST Last Resulted: 01/05/24 17:52 CST             Assessment:       1. Prostate cancer    2. Nocturia more than twice per night    3. Incomplete emptying of bladder    4. Other male erectile dysfunction                  Plan:       1. Prostate cancer  Doing well  - Prostate Specific Antigen, Diagnostic; Future    2. Nocturia more than twice per night   Uroxatral    3. Incomplete emptying of bladder  stable    4. Other male erectile dysfunction  Cailis           Follow up in about 3 months (around 4/12/2024) for Follow up Established, Review PSA.

## 2024-03-04 ENCOUNTER — HOSPITAL ENCOUNTER (OUTPATIENT)
Dept: RADIOLOGY | Facility: HOSPITAL | Age: 69
Discharge: HOME | End: 2024-03-04
Attending: INTERNAL MEDICINE
Payer: MEDICARE

## 2024-03-04 ENCOUNTER — OFFICE VISIT (OUTPATIENT)
Dept: FAMILY MEDICINE | Facility: CLINIC | Age: 69
End: 2024-03-04
Payer: MEDICARE

## 2024-03-04 VITALS
SYSTOLIC BLOOD PRESSURE: 134 MMHG | OXYGEN SATURATION: 97 % | TEMPERATURE: 99 F | WEIGHT: 167.31 LBS | HEIGHT: 72 IN | HEART RATE: 62 BPM | BODY MASS INDEX: 22.66 KG/M2 | RESPIRATION RATE: 18 BRPM | DIASTOLIC BLOOD PRESSURE: 80 MMHG

## 2024-03-04 DIAGNOSIS — R42 DIZZINESS AND GIDDINESS: ICD-10-CM

## 2024-03-04 DIAGNOSIS — I77.89 OTHER SPECIFIED DISORDERS OF ARTERIES AND ARTERIOLES: ICD-10-CM

## 2024-03-04 DIAGNOSIS — R42 DIZZINESS AND GIDDINESS: Primary | ICD-10-CM

## 2024-03-04 DIAGNOSIS — I70.0 AORTIC ATHEROSCLEROSIS: ICD-10-CM

## 2024-03-04 PROCEDURE — 70450 CT HEAD/BRAIN W/O DYE: CPT | Mod: TC

## 2024-03-04 PROCEDURE — 99215 OFFICE O/P EST HI 40 MIN: CPT | Mod: PBBFAC,PO,25 | Performed by: INTERNAL MEDICINE

## 2024-03-04 PROCEDURE — 99999 PR PBB SHADOW E&M-EST. PATIENT-LVL V: CPT | Mod: PBBFAC,,, | Performed by: INTERNAL MEDICINE

## 2024-03-04 PROCEDURE — 70450 CT HEAD/BRAIN W/O DYE: CPT | Mod: 26,,, | Performed by: RADIOLOGY

## 2024-03-04 PROCEDURE — 93880 EXTRACRANIAL BILAT STUDY: CPT | Mod: 26,,, | Performed by: RADIOLOGY

## 2024-03-04 PROCEDURE — 93880 EXTRACRANIAL BILAT STUDY: CPT | Mod: TC

## 2024-03-04 PROCEDURE — 99214 OFFICE O/P EST MOD 30 MIN: CPT | Mod: S$PBB,,, | Performed by: INTERNAL MEDICINE

## 2024-03-04 RX ORDER — MECLIZINE HCL 12.5 MG 12.5 MG/1
12.5 TABLET ORAL 3 TIMES DAILY PRN
Qty: 90 TABLET | Refills: 0 | Status: SHIPPED | OUTPATIENT
Start: 2024-03-04

## 2024-03-04 NOTE — PROGRESS NOTES
Chief Complaint: Dizziness (1wk ago.)      Epifanio Guallpa  is a 69 y.o. year old patient who presents today for dizziness    Dizziness for a week. Sx initially just in the evening, now has it the whole day. Usually after walking but also occurred when driving.     Past Surgical History:   Procedure Laterality Date    HERNIA REPAIR      TONSILLECTOMY      trus/bx 2020      trus/bx 2021      WISDOM TOOTH EXTRACTION          Family History   Problem Relation Age of Onset    Heart disease Father     Hypertension Father     Alzheimer's disease Mother     No Known Problems Sister     No Known Problems Brother     Heart disease Maternal Grandmother     Heart attack Maternal Grandmother     No Known Problems Maternal Grandfather     No Known Problems Paternal Grandmother     No Known Problems Paternal Grandfather         Social History     Socioeconomic History    Marital status: Single   Tobacco Use    Smoking status: Never    Smokeless tobacco: Never   Substance and Sexual Activity    Alcohol use: Yes     Alcohol/week: 1.0 standard drink of alcohol     Types: 1 Glasses of wine per week     Comment: occasionally- once a week    Drug use: No    Sexual activity: Not Currently     Partners: Female         Current Outpatient Medications:     alfuzosin (UROXATRAL) 10 mg Tb24, Take 1 tablet (10 mg total) by mouth once daily., Disp: 90 tablet, Rfl: 3    aspirin (ECOTRIN) 81 MG EC tablet, Take 81 mg by mouth once daily., Disp: , Rfl:     CALCIUM ORAL, Take by mouth once daily., Disp: , Rfl:     coffee/theanine/superoxide dis (NEURIVA DE-STRESS ORAL), Take by mouth., Disp: , Rfl:     garlic Cap, Take by mouth once daily. , Disp: , Rfl:     glucosamine/chondr mercer A sod (OSTEO BI-FLEX ORAL), Take by mouth., Disp: , Rfl:     multivitamin (THERAGRAN) per tablet, Take 1 tablet by mouth once daily., Disp: , Rfl:     omega-3 fatty acids 1,000 mg Cap, Take 1 g by mouth., Disp: , Rfl:     meclizine (ANTIVERT) 12.5 mg tablet, Take 1  tablet (12.5 mg total) by mouth 3 (three) times daily as needed for Dizziness., Disp: 90 tablet, Rfl: 0    Current Facility-Administered Medications:     gentamicin injection 80 mg, 80 mg, Intramuscular, 1 time in Clinic/HOD, Rick Sanchez MD     Review of Systems   HENT:  Negative for ear pain, hearing loss and tinnitus.    Eyes:  Negative for blurred vision and double vision.   Respiratory:  Negative for shortness of breath.    Cardiovascular:  Negative for chest pain and palpitations.   Musculoskeletal:  Negative for neck pain.   Neurological:  Positive for dizziness. Negative for sensory change, focal weakness, loss of consciousness, weakness and headaches.        Objective:      Vitals:    03/04/24 0759   BP: 134/80   Pulse: 62   Resp: 18   Temp: 99.1 °F (37.3 °C)       Physical Exam  Vitals and nursing note reviewed.   Constitutional:       Appearance: Normal appearance.   HENT:      Head: Normocephalic and atraumatic.   Cardiovascular:      Rate and Rhythm: Normal rate and regular rhythm.   Pulmonary:      Effort: Pulmonary effort is normal.      Breath sounds: Normal breath sounds. No wheezing or rales.   Abdominal:      General: Bowel sounds are normal.      Palpations: Abdomen is soft.      Tenderness: There is no abdominal tenderness.   Musculoskeletal:      Right lower leg: No edema.      Left lower leg: No edema.   Skin:     General: Skin is warm and dry.   Neurological:      General: No focal deficit present.      Mental Status: He is alert and oriented to person, place, and time.      Sensory: No sensory deficit.      Motor: No weakness.      Coordination: Coordination normal.      Gait: Gait normal.   Psychiatric:         Mood and Affect: Mood normal.         Behavior: Behavior normal.          Assessment:       1. Dizziness and giddiness    2. Other specified disorders of arteries and arterioles    3. Aortic atherosclerosis          Plan:   1. Dizziness and giddiness  Assessment &  Plan:  Like vasovagal, however need to rule out intracranial process vs TIA. Unlikely 2/2 dehydration or hypoglycemia  + vertigo   Occurred when driving, when turning his neck to the side     - counseled on making slow positional changes   - PT for vestibular therapy   - CT brain   - Doppler carotids  - meclizine PRN    Orders:  -     CT Head Without Contrast; Future; Expected date: 03/04/2024  -     US Carotid Bilateral; Future; Expected date: 03/04/2024  -     Ambulatory referral/consult to Physical/Occupational Therapy; Future; Expected date: 03/11/2024  -     meclizine (ANTIVERT) 12.5 mg tablet; Take 1 tablet (12.5 mg total) by mouth 3 (three) times daily as needed for Dizziness.  Dispense: 90 tablet; Refill: 0    2. Other specified disorders of arteries and arterioles  -     US Carotid Bilateral; Future; Expected date: 03/04/2024    3. Aortic atherosclerosis  Assessment & Plan:  Carotid doppler   Continue ASA  Will need lipid panel              No follow-ups on file.

## 2024-03-04 NOTE — PROGRESS NOTES
Health Maintenance Due   Topic     Shingles Vaccine (1 of 2) Hx of chickenpox. Notified pt can get vaccine at pharmacy.    RSV Vaccine (Age 60+ and Pregnant patients) (1 - 1-dose 60+ series) Not offered at this Facility    COVID-19 Vaccine (3 - 2023-24 season) Not offered at this Facility

## 2024-03-04 NOTE — ASSESSMENT & PLAN NOTE
Like vasovagal, however need to rule out intracranial process vs TIA. Unlikely 2/2 dehydration or hypoglycemia  + vertigo   Occurred when driving, when turning his neck to the side     - counseled on making slow positional changes   - PT for vestibular therapy   - CT brain   - Doppler carotids  - meclizine PRN

## 2024-04-05 ENCOUNTER — CLINICAL SUPPORT (OUTPATIENT)
Dept: REHABILITATION | Facility: HOSPITAL | Age: 69
End: 2024-04-05
Attending: INTERNAL MEDICINE
Payer: MEDICARE

## 2024-04-05 DIAGNOSIS — R42 DIZZINESS AND GIDDINESS: ICD-10-CM

## 2024-04-05 DIAGNOSIS — R42 DIZZINESS: Primary | ICD-10-CM

## 2024-04-05 DIAGNOSIS — R26.89 IMBALANCE: ICD-10-CM

## 2024-04-05 PROCEDURE — 97165 OT EVAL LOW COMPLEX 30 MIN: CPT | Mod: PN

## 2024-04-05 PROCEDURE — 97530 THERAPEUTIC ACTIVITIES: CPT | Mod: PN

## 2024-04-05 NOTE — PROGRESS NOTES
[facility]  Initial Neurological Evaluation - Evaluation Only     Date: 4/5/2024  Patient: Epifanio Guallpa  Chart Number: [id number]    Therapy Diagnosis:   Encounter Diagnosis   Name Primary?    Dizziness and giddiness      Physician: Ksenia Mcdonald MD    Physician Orders: Vestibular therapy  Medical Diagnosis: dizziness and giddiness  Evaluation Date: 4/5/2024  Plan of Care Expiration Period: Not established -- eval only  Insurance Authorization period Expiration: 3/4/2025  Visit # / Visits Authorized: 1 / 1  FOTO: 4/5/2024    Time In:1120  Time Out: 1155  Total Billable (one on one) Time: 35 minutes    Precautions: Standard    Subjective     History of Current Condition:   Dizziness starting ~60 days ago. Had one instance of bad room-spinning while driving, but has not had any since. No N/V. Some slight imbalance but no falls. Feels good cutting grass or exercising. Had meclizine prescribed but takes it PRN. Symptoms are unchanging. Spinning happened when turning head to right when driving. Reluctant to education regarding vestibular etiology of symptoms    Pain:  No pain    Functional Limitations/Social History:    Home/Living environment : No issues navigating home environment   DME: none    Leisure: exercising (hasn't been limited since dizziness)     Driving: yes     Prior Level of Function: independent   Current Level of Function: independent     Functional Status      Functional Status:  Independent in all I/ADLs    Comments: --    Past Medical History/Physical Systems Review:     Past Medical History:  Epifanio Guallpa  has a past medical history of Anxiety, Kidney stone, Kidney stone, and Prostate cancer.    Past Surgical History:  Epifanio Guallpa  has a past surgical history that includes Tonsillectomy; [place] tooth extraction; Hernia repair; trus/bx 2020; and trus/bx 2021.    Current Medications:  Epifanio has a current medication list which includes the  following prescription(s): alfuzosin, aspirin, calcium, coffee/theanine/superoxide dis, garlic, glucosamine/chondr mercer a sod, meclizine, multivitamin, and omega-3 fatty acids, and the following Facility-Administered Medications: gentamicin.    Allergies:  Review of patient's allergies indicates:  No Known Allergies     Objective     Cognitive Exam:  No deficits noted; not assessed formally    Oculomotor Exam: WNL  Oculomotor ROM:   Eye Alignment:   Visual Field:   Acuity:     Spontaneous Nystagmus:   Gaze Holding Nystagmus:   Gaze Holding (No Fixation):   Smooth Pursuits:     Horizontal:   Vertical:   Saccades:    Horizontal:   Vertical:   Near Point Convergence (cm):   Accommodation:   Fixation (5 second sustained visual attention):     VOR Slow Head Movement:   Head Thrust:   Dynamic Visual Acuity (DVA):   VOR Cancellation:   Head Shake:   Cover/Cross Cover:  Cover/Uncover:    Balance     Postural control: MCTSIB: Evaluation 04/05/2024 (Average of 3 trials)   1. Eyes Open/feet together/Firm: 30 seconds   2. Eyes Closed/feet together/Firm:  30 seconds   3. Eyes Open/feet together/Foam:  30 seconds   4. Eyes Closed/feet together/Foam:  8 seconds   TOTAL 108/120       CMS Impairment/Limitation/Restriction for FOTO Vestibular Survey    Therapist reviewed FOTO scores for Epifanio Guallpa on 4/5/2024.   FOTO documents entered into [employer] - see Media section.             Treatment     Treatment Time In: 1145  Treatment Time Out: 1155  Total Treatment time separate from Evaluation time: 10 minutes     Epifanio participated in dynamic functional therapeutic activities to improve functional performance for 10 minutes, including:  - Review of balance HEP: see patient instructions  Home Exercises and Patient Education Provided    Education provided:   -role of OT, goals for OT, scheduling/cancellations, insurance limitations with patient.  -Additional Education provided: vestibular system's role in balance, etiology of  vestibulopathy, etc.     Written Home Exercises Provided: yes.  Exercises were reviewed and Epifanio was able to demonstrate them prior to the end of the session.    Epifanio demonstrated good  understanding of the education provided.     See EMR under Patient Instructions for exercises provided 4/5/2024.    Assessment     Epifanio Guallpa is a 69 y.o. male referred to outpatient occupational therapy and presents with a medical diagnosis of dizziness and giddiness and demonstrates decreased balance. Pt educated on possible vestibular etiology of balance deficits and provided with HEP. Pt not interested in continuing with vestibular therapy. Pt appropriate to eval and dc with HEP for balance deficits.     Medical Necessity is demonstrated by the following  Occupational Profile/History  Co-morbidities and personal factors that may impact the plan of care [x] LOW: Brief chart review  [] MODERATE: Expanded chart review   [] HIGH: Extensive chart review    Moderate / High Support Documentation:      Examination  Performance deficits relating to physical, cognitive or psychosocial skills that result in activity limitations and/or participation restrictions  [] LOW: addressing 1-3 Performance deficits  [] MODERATE: 3-5 Performance deficits  [] HIGH: 5+ Performance deficits (please support below)    Moderate / High Support Documentation:    Physical:      Cognitive:      Psychosocial:         Treatment Options [x] LOW: Limited options  [] MODERATE: Several options  [] HIGH: Multiple options      Decision Making/ Complexity Score: low         The following goals were discussed with the patient and patient is in agreement with them as to be addressed in the treatment plan.     Patient's Goals for Therapy: --     Goals:   Evaluation only; no goals established.    Plan     Patient evaluated as dcumented above. In conjunction with ourough chart review and patient history, it is determied that patient does not require skilled  occupational therapy services at this time. Patient provided with HEP and verbalized understanding. Patient made aware that he/she can obtain new referral for evaluation should occupational therapy servcices become warranted in future.

## 2024-04-12 ENCOUNTER — LAB VISIT (OUTPATIENT)
Dept: LAB | Facility: HOSPITAL | Age: 69
End: 2024-04-12
Attending: UROLOGY
Payer: MEDICARE

## 2024-04-12 DIAGNOSIS — C61 PROSTATE CANCER: ICD-10-CM

## 2024-04-12 LAB — COMPLEXED PSA SERPL-MCNC: 1.3 NG/ML (ref 0–4)

## 2024-04-12 PROCEDURE — 84153 ASSAY OF PSA TOTAL: CPT | Performed by: UROLOGY

## 2024-04-12 PROCEDURE — 36415 COLL VENOUS BLD VENIPUNCTURE: CPT | Performed by: UROLOGY

## 2024-04-22 ENCOUNTER — OFFICE VISIT (OUTPATIENT)
Dept: UROLOGY | Facility: CLINIC | Age: 69
End: 2024-04-22
Payer: MEDICARE

## 2024-04-22 VITALS — WEIGHT: 171.94 LBS | BODY MASS INDEX: 23.32 KG/M2

## 2024-04-22 DIAGNOSIS — R35.1 NOCTURIA MORE THAN TWICE PER NIGHT: ICD-10-CM

## 2024-04-22 DIAGNOSIS — C61 PROSTATE CANCER: Primary | ICD-10-CM

## 2024-04-22 DIAGNOSIS — N52.8 OTHER MALE ERECTILE DYSFUNCTION: ICD-10-CM

## 2024-04-22 PROCEDURE — 99213 OFFICE O/P EST LOW 20 MIN: CPT | Mod: S$PBB,,, | Performed by: UROLOGY

## 2024-04-22 PROCEDURE — 99999 PR PBB SHADOW E&M-EST. PATIENT-LVL III: CPT | Mod: PBBFAC,,, | Performed by: UROLOGY

## 2024-04-22 PROCEDURE — 99213 OFFICE O/P EST LOW 20 MIN: CPT | Mod: PBBFAC | Performed by: UROLOGY

## 2024-04-22 NOTE — PROGRESS NOTES
Subjective:       Patient ID: Epifanio Guallpa is a 69 y.o. male The patient's last visit with me was on 1/12/2024.     Chief Complaint:   No chief complaint on file.    Prostate Cancer   He underwent a prostate needle biopsy on 1/6/2020.  His biopsy was indicated due to: Elevated PSA.  Afterwards he experienced: Gross Hematuria and Blood in stool.  These symptoms have resolved.  His PSA prior to biopsy was 8.6.  His prostate size was 42 grams.  The ultrasound did not show a median lobe.  He currently does not have erectile dysfunction.  His pathology showed: prostate cancer.    Original Biopsy: 3+3=6 in 4/12 cores, Left Base/Mid    He met with Dr. Acevedo and agreed to active surveillance.  He has been doing active surveillance with Dr. Acevedo.  His last PSA was 8.0 on 1/22/2021.    He underwent a prostate needle biopsy on 5/6/2021.  His biopsy was indicated due to: Prostate Cancer.  Afterwards he experienced: Gross Hematuria and Blood in stool.  These symptoms have resolved.  His PSA prior to biopsy was 7.2.  His prostate size was 26 grams.  The ultrasound did not show a median lobe.  He currently does not have erectile dysfunction.  His pathology showed: Prostate Cancer.  Pathology: 3+4=7, 1 core 20%. 3+3=6, 15%    03/11/2022  Last visit his PSA remained higher.  He was unable to do the MRI due to the claustrophobic conditions.  He is here to discuss other options     3/29/2022  Markers were placed    5/18/2022  He saw Dr. Marx for an urgent visit regarding dysuria.  He finished his radiation by Dr. Acevedo at [facility] on 6/09/2022.    06/14/2022  He is feeling better.  The dysuria is much improved.     10/6/2022  He feels well.  No dysuria.  IPSS Questionnaire (AUA-7): 6    1/5/2022  He is doing well.  He has not been on Uroxatral for about a month and doing well.    6/27/2023  He feels well.  He denies hematuria or dysuria.  Cialis works.    10/5/2023  He feels okay, he would like to restart Uroxatral.  His  stream is not was strong.    1/12/2024  He is back with a new PSA.  He is taking Uroxatral.  He is not sure how well his stream is improved with the medication.  He does not want a procedure.    04/22/2024  He feels well.  He has no complaint.            ACTIVE MEDICAL ISSUES:  Patient Active Problem List   Diagnosis    Right inguinal hernia    S/P right inguinal hernia repair    RBBB    Aortic atherosclerosis    Dizziness and giddiness    Dizziness    Imbalance       ALLERGIES AND MEDICATIONS: updated and reviewed.  Review of patient's allergies indicates:  No Known Allergies  Current Outpatient Medications   Medication Sig Dispense Refill    alfuzosin (UROXATRAL) 10 mg Tb24 Take 1 tablet (10 mg total) by mouth once daily. 90 tablet 3    aspirin (ECOTRIN) 81 MG EC tablet Take 81 mg by mouth once daily.      CALCIUM ORAL Take by mouth once daily.      coffee/theanine/superoxide dis (NEURIVA DE-STRESS ORAL) Take by mouth.      garlic Cap Take by mouth once daily.       glucosamine/chondr mercer A sod (OSTEO BI-FLEX ORAL) Take by mouth.      meclizine (ANTIVERT) 12.5 mg tablet Take 1 tablet (12.5 mg total) by mouth 3 (three) times daily as needed for Dizziness. 90 tablet 0    multivitamin (THERAGRAN) per tablet Take 1 tablet by mouth once daily.      omega-3 fatty acids 1,000 mg Cap Take 1 g by mouth.       Current Facility-Administered Medications   Medication Dose Route Frequency Provider Last Rate Last Admin    gentamicin injection 80 mg  80 mg Intramuscular 1 time in Clinic/HOD Rick Sanchez MD           Review of Systems   Constitutional:  Negative for activity change, fatigue, fever and unexpected weight change.   HENT:  Negative for congestion.    Eyes:  Negative for redness.   Respiratory:  Negative for chest tightness and shortness of breath.    Cardiovascular:  Negative for chest pain and leg swelling.   Gastrointestinal:  Negative for abdominal pain, constipation, diarrhea, nausea and vomiting.    Genitourinary:  Negative for dysuria, flank pain, frequency, hematuria, penile pain, penile swelling, scrotal swelling, testicular pain and urgency.   Musculoskeletal:  Negative for arthralgias and back pain.   Neurological:  Negative for dizziness and light-headedness.   Psychiatric/Behavioral:  Negative for behavioral problems and confusion. The patient is not nervous/anxious.    All other systems reviewed and are negative.      Objective:      Vitals:    04/22/24 1440   Weight: 78 kg (171 lb 15.3 oz)             Physical Exam  Vitals and nursing note reviewed.   Constitutional:       Appearance: He is well-developed.   HENT:      Head: Normocephalic.   Eyes:      Conjunctiva/sclera: Conjunctivae normal.   Neck:      Thyroid: No thyromegaly.      Trachea: No tracheal deviation.   Cardiovascular:      Rate and Rhythm: Normal rate.      Heart sounds: Normal heart sounds.   Pulmonary:      Effort: Pulmonary effort is normal. No respiratory distress.      Breath sounds: Normal breath sounds. No wheezing.   Abdominal:      General: Bowel sounds are normal.      Palpations: Abdomen is soft.      Tenderness: There is no abdominal tenderness. There is no rebound.      Hernia: No hernia is present.   Musculoskeletal:         General: No tenderness. Normal range of motion.      Cervical back: Normal range of motion and neck supple.   Lymphadenopathy:      Cervical: No cervical adenopathy.   Skin:     General: Skin is warm and dry.      Findings: No erythema or rash.   Neurological:      Mental Status: He is alert and oriented to person, place, and time.   Psychiatric:         Behavior: Behavior normal.         Thought Content: Thought content normal.         Judgment: Judgment normal.         Urine dipstick shows negative for all components.  Micro exam: negative for WBC's or RBC's.               Component Ref Range & Units 10 d ago  (4/12/24) 3 mo ago  (1/5/24) 6 mo ago  (10/2/23) 9 mo ago  (6/28/23) 1 yr ago  (3/30/23)  1 yr ago  (12/28/22) 1 yr ago  (8/15/22)   PSA Diagnostic 0.00 - 4.00 ng/mL 1.3 1.3 CM 2.0 CM 1.3 CM 2.1 CM 1.6 CM 2.6 CM   Comment: The testing method is a chemiluminescent microparticle immunoassay  manufactured by [employer] and performed on the   or  [employer] system. Values obtained with different assay manufacturers  for  methods may be different and cannot be used interchangeably.  PSA Expected levels:  Hormonal Therapy: <0.05 ng/ml  Prostatectomy: <0.01 ng/ml  Radiation Therapy: <1.00 ng/ml   Resulting Agency  [facility]              Narrative  Performed by: [facility]  PSA 3 months      Specimen Collected: 04/12/24 08:05 CDT               Assessment:       1. Prostate cancer    2. Nocturia more than twice per night    3. Other male erectile dysfunction                    Plan:       1. Prostate cancer  Doing well  - Prostate Specific Antigen, Diagnostic; Future    2. Nocturia more than twice per night   Uroxatral    3. Incomplete emptying of bladder  stable    4. Other male erectile dysfunction  Cailis           Follow up in about 6 months (around 10/22/2024) for Review PSA.

## 2024-06-07 ENCOUNTER — TELEPHONE (OUTPATIENT)
Dept: UROLOGY | Facility: CLINIC | Age: 69
End: 2024-06-07
Payer: MEDICARE

## 2024-06-07 NOTE — TELEPHONE ENCOUNTER
Pt was contacted pt informed provider does not have anything available today. Appt scheduled for Monday. Pt advised to go to ED or Urgent care over the weekend if pain is unbearable.  ----- Message from Augustus Avila MA sent at 6/7/2024  8:07 AM CDT -----  Type: Patient Call Back    Who called:Self    What is the request in detail:pt. States he's having frequent urination, urethral pain and pain around his kidneys .. He's asking to be scheduled to day please ..     Can the clinic reply by [facility]?NO    Would the patient rather a call back or a response via [facility]? Yes, call     Best call back number: [phone] (home)

## 2024-06-10 ENCOUNTER — OFFICE VISIT (OUTPATIENT)
Dept: UROLOGY | Facility: CLINIC | Age: 69
End: 2024-06-10
Payer: MEDICARE

## 2024-06-10 VITALS — BODY MASS INDEX: 22.47 KG/M2 | WEIGHT: 165.69 LBS

## 2024-06-10 DIAGNOSIS — Z87.442 HISTORY OF KIDNEY STONES: ICD-10-CM

## 2024-06-10 DIAGNOSIS — C61 PROSTATE CANCER: ICD-10-CM

## 2024-06-10 DIAGNOSIS — R35.1 NOCTURIA MORE THAN TWICE PER NIGHT: Primary | ICD-10-CM

## 2024-06-10 DIAGNOSIS — N52.8 OTHER MALE ERECTILE DYSFUNCTION: ICD-10-CM

## 2024-06-10 PROCEDURE — 87086 URINE CULTURE/COLONY COUNT: CPT | Performed by: UROLOGY

## 2024-06-10 PROCEDURE — 99999 PR PBB SHADOW E&M-EST. PATIENT-LVL III: CPT | Mod: PBBFAC,,, | Performed by: UROLOGY

## 2024-06-10 PROCEDURE — 99213 OFFICE O/P EST LOW 20 MIN: CPT | Mod: PBBFAC | Performed by: UROLOGY

## 2024-06-10 PROCEDURE — 99214 OFFICE O/P EST MOD 30 MIN: CPT | Mod: S$PBB,,, | Performed by: UROLOGY

## 2024-06-10 NOTE — PROGRESS NOTES
Subjective:       Patient ID: Epifanio Guallpa is a 69 y.o. male The patient's last visit with me was on 4/22/2024.     Chief Complaint:   Chief Complaint   Patient presents with    Penis Pain    Urinary Urgency     Prostate Cancer   He underwent a prostate needle biopsy on 1/6/2020.  His biopsy was indicated due to: Elevated PSA.  Afterwards he experienced: Gross Hematuria and Blood in stool.  These symptoms have resolved.  His PSA prior to biopsy was 8.6.  His prostate size was 42 grams.  The ultrasound did not show a median lobe.  He currently does not have erectile dysfunction.  His pathology showed: prostate cancer.    Original Biopsy: 3+3=6 in 4/12 cores, Left Base/Mid    He met with Dr. Acevedo and agreed to active surveillance.  He has been doing active surveillance with Dr. Acevedo.  His last PSA was 8.0 on 1/22/2021.    He underwent a prostate needle biopsy on 5/6/2021.  His biopsy was indicated due to: Prostate Cancer.  Afterwards he experienced: Gross Hematuria and Blood in stool.  These symptoms have resolved.  His PSA prior to biopsy was 7.2.  His prostate size was 26 grams.  The ultrasound did not show a median lobe.  He currently does not have erectile dysfunction.  His pathology showed: Prostate Cancer.  Pathology: 3+4=7, 1 core 20%. 3+3=6, 15%    03/11/2022  Last visit his PSA remained higher.  He was unable to do the MRI due to the claustrophobic conditions.  He is here to discuss other options     3/29/2022  Markers were placed    5/18/2022  He saw Dr. Marx for an urgent visit regarding dysuria.  He finished his radiation by Dr. Acevedo at [facility] on 6/09/2022.    06/14/2022  He is feeling better.  The dysuria is much improved.     10/6/2022  He feels well.  No dysuria.  IPSS Questionnaire (AUA-7): 6    1/5/2022  He is doing well.  He has not been on Uroxatral for about a month and doing well.    6/27/2023  He feels well.  He denies hematuria or dysuria.  Cialis works.    10/5/2023  He feels okay,  he would like to restart Uroxatral.  His stream is not was strong.    1/12/2024  He is back with a new PSA.  He is taking Uroxatral.  He is not sure how well his stream is improved with the medication.  He does not want a procedure.    4/22/2024  He feels well.  He has no complaint.    06/10/2024  Last week he developed a dull back ache, penile pain, and urinary urgency.  This lasted about 3 days and has now resolved.  He denies fever or gross hematuria.            ACTIVE MEDICAL ISSUES:  Patient Active Problem List   Diagnosis    Right inguinal hernia    S/P right inguinal hernia repair    RBBB    Aortic atherosclerosis    Dizziness and giddiness    Dizziness    Imbalance       ALLERGIES AND MEDICATIONS: updated and reviewed.  Review of patient's allergies indicates:  No Known Allergies  Current Outpatient Medications   Medication Sig    alfuzosin (UROXATRAL) 10 mg Tb24 Take 1 tablet (10 mg total) by mouth once daily.    aspirin (ECOTRIN) 81 MG EC tablet Take 81 mg by mouth once daily.    CALCIUM ORAL Take by mouth once daily.    coffee/theanine/superoxide dis (NEURIVA DE-STRESS ORAL) Take by mouth.    garlic Cap Take by mouth once daily.     glucosamine/chondr emrcer A sod (OSTEO BI-FLEX ORAL) Take by mouth.    meclizine (ANTIVERT) 12.5 mg tablet Take 1 tablet (12.5 mg total) by mouth 3 (three) times daily as needed for Dizziness.    multivitamin (THERAGRAN) per tablet Take 1 tablet by mouth once daily.    omega-3 fatty acids 1,000 mg Cap Take 1 g by mouth.     Current Facility-Administered Medications   Medication    gentamicin injection 80 mg       Review of Systems   Constitutional:  Negative for activity change, fatigue, fever and unexpected weight change.   HENT:  Negative for congestion.    Eyes:  Negative for redness.   Respiratory:  Negative for chest tightness and shortness of breath.    Cardiovascular:  Negative for chest pain and leg swelling.   Gastrointestinal:  Negative for abdominal pain, constipation,  diarrhea, nausea and vomiting.   Genitourinary:  Negative for dysuria, flank pain, frequency, hematuria, penile pain, penile swelling, scrotal swelling, testicular pain and urgency.   Musculoskeletal:  Negative for arthralgias and back pain.   Neurological:  Negative for dizziness and light-headedness.   Psychiatric/Behavioral:  Negative for behavioral problems and confusion. The patient is not nervous/anxious.    All other systems reviewed and are negative.      Objective:      Vitals:    06/10/24 1512   Weight: 75.1 kg (165 lb 10.8 oz)               Physical Exam  Vitals and nursing note reviewed.   Constitutional:       Appearance: He is well-developed.   HENT:      Head: Normocephalic.   Eyes:      Conjunctiva/sclera: Conjunctivae normal.   Neck:      Thyroid: No thyromegaly.      Trachea: No tracheal deviation.   Cardiovascular:      Rate and Rhythm: Normal rate.      Heart sounds: Normal heart sounds.   Pulmonary:      Effort: Pulmonary effort is normal. No respiratory distress.      Breath sounds: Normal breath sounds. No wheezing.   Abdominal:      General: Bowel sounds are normal.      Palpations: Abdomen is soft.      Tenderness: There is no abdominal tenderness. There is no rebound.      Hernia: No hernia is present.   Musculoskeletal:         General: No tenderness. Normal range of motion.      Cervical back: Normal range of motion and neck supple.   Lymphadenopathy:      Cervical: No cervical adenopathy.   Skin:     General: Skin is warm and dry.      Findings: No erythema or rash.   Neurological:      Mental Status: He is alert and oriented to person, place, and time.   Psychiatric:         Behavior: Behavior normal.         Thought Content: Thought content normal.         Judgment: Judgment normal.         Urine dipstick shows negative for all components.  Micro exam: negative for WBC's or RBC's.               Component Ref Range & Units 10 d ago  (4/12/24) 3 mo ago  (1/5/24) 6 mo ago  (10/2/23) 9 mo  ago  (6/28/23) 1 yr ago  (3/30/23) 1 yr ago  (12/28/22) 1 yr ago  (8/15/22)   PSA Diagnostic 0.00 - 4.00 ng/mL 1.3 1.3 CM 2.0 CM 1.3 CM 2.1 CM 1.6 CM 2.6 CM   Comment: The testing method is a chemiluminescent microparticle immunoassay  manufactured by [employer] and performed on the [employer]  or  [employer] system. Values obtained with different assay manufacturers  for  methods may be different and cannot be used interchangeably.  PSA Expected levels:  Hormonal Therapy: <0.05 ng/ml  Prostatectomy: <0.01 ng/ml  Radiation Therapy: <1.00 ng/ml   Resulting Agency  [facility]              Narrative  Performed by: [facility]  PSA 3 months      Specimen Collected: 04/12/24 08:05 CDT               Assessment:       1. Nocturia more than twice per night    2. Prostate cancer    3. Other male erectile dysfunction    4. History of kidney stones                      Plan:       1. Nocturia more than twice per night    - Urine culture    2. Prostate cancer  PSA in October    3. Other male erectile dysfunction  stable    4. History of kidney stones    - US Retroperitoneal Complete; Future           Follow up in about 4 months (around 10/10/2024) for Review PSA.

## 2024-06-12 LAB — BACTERIA UR CULT: NORMAL

## 2024-06-20 ENCOUNTER — HOSPITAL ENCOUNTER (OUTPATIENT)
Dept: RADIOLOGY | Facility: HOSPITAL | Age: 69
Discharge: HOME | End: 2024-06-20
Attending: UROLOGY
Payer: MEDICARE

## 2024-06-20 DIAGNOSIS — Z87.442 HISTORY OF KIDNEY STONES: ICD-10-CM

## 2024-06-20 PROCEDURE — 76770 US EXAM ABDO BACK WALL COMP: CPT | Mod: TC

## 2024-06-20 PROCEDURE — 76770 US EXAM ABDO BACK WALL COMP: CPT | Mod: 26,,, | Performed by: INTERNAL MEDICINE

## 2024-07-10 ENCOUNTER — OFFICE VISIT (OUTPATIENT)
Dept: FAMILY MEDICINE | Facility: CLINIC | Age: 69
End: 2024-07-10
Payer: MEDICARE

## 2024-07-10 VITALS
SYSTOLIC BLOOD PRESSURE: 122 MMHG | RESPIRATION RATE: 16 BRPM | DIASTOLIC BLOOD PRESSURE: 70 MMHG | WEIGHT: 166.25 LBS | TEMPERATURE: 100 F | HEART RATE: 79 BPM | BODY MASS INDEX: 22.52 KG/M2 | HEIGHT: 72 IN | OXYGEN SATURATION: 95 %

## 2024-07-10 DIAGNOSIS — R05.9 COUGH, UNSPECIFIED TYPE: ICD-10-CM

## 2024-07-10 DIAGNOSIS — J02.9 SORE THROAT: ICD-10-CM

## 2024-07-10 DIAGNOSIS — Z20.822 SUSPECTED COVID-19 VIRUS INFECTION: Primary | ICD-10-CM

## 2024-07-10 DIAGNOSIS — R09.81 NASAL CONGESTION: ICD-10-CM

## 2024-07-10 DIAGNOSIS — J06.9 UPPER RESPIRATORY TRACT INFECTION, UNSPECIFIED TYPE: ICD-10-CM

## 2024-07-10 PROCEDURE — 99214 OFFICE O/P EST MOD 30 MIN: CPT | Mod: PBBFAC,PO | Performed by: INTERNAL MEDICINE

## 2024-07-10 PROCEDURE — 0241U SARS-COV2 (COVID) WITH FLU/RSV BY PCR: CPT | Performed by: INTERNAL MEDICINE

## 2024-07-10 PROCEDURE — 99214 OFFICE O/P EST MOD 30 MIN: CPT | Mod: S$PBB,,, | Performed by: INTERNAL MEDICINE

## 2024-07-10 PROCEDURE — 99999 PR PBB SHADOW E&M-EST. PATIENT-LVL IV: CPT | Mod: PBBFAC,,, | Performed by: INTERNAL MEDICINE

## 2024-07-10 RX ORDER — PROMETHAZINE HYDROCHLORIDE AND DEXTROMETHORPHAN HYDROBROMIDE 6.25; 15 MG/5ML; MG/5ML
5 SYRUP ORAL EVERY 12 HOURS PRN
Qty: 100 ML | Refills: 0 | Status: SHIPPED | OUTPATIENT
Start: 2024-07-10 | End: 2024-07-20

## 2024-07-10 NOTE — PROGRESS NOTES
Subjective:       Patient ID: Epifanio Guallpa is a pleasant 69 y.o. White male patient    Chief Complaint: Cough (X3 days), Sore Throat (X3 days), and Nasal Congestion (X3 days)      Patient is a new pt to me but is established patient from Dr. Wylie, last visit with Dr. Mcdonald on March 4, 2024 for dizziness and giddiness.    HPI:      Patient with past medical history as per list of problems below coming today due to concern of upper respiratory infection.  Symptoms started 3 days ago with a scratchy throat, then followed by nasal congestion, developed a cough that is dry, can come any time, mild.  He had some body aches.  He had a fever up to 101 yesterday evening with chills.  He denies any shortness of breath, chest pain, palpitations, no headache.  May have some loss of taste and smell.  On last Friday, he accompanied his brother in law to the ED, Dx of COVID.  He was never swabbed for COVID.  His last COVID shot was in 2021.  He is up-to-date for flu shot.    He took some NSAIDs as well as over-the-counter Ara-[place]gin for severe cold and cough drops.    Patient Active Problem List   Diagnosis    Right inguinal hernia    S/P right inguinal hernia repair    RBBB    Aortic atherosclerosis    Dizziness and giddiness    Dizziness    Imbalance         PAST MEDICAL HISTORY  Past Medical History:   Diagnosis Date    Anxiety     Kidney stone     Kidney stone     Prostate cancer         PAST SURGICAL HISTORY:  Past Surgical History:   Procedure Laterality Date    HERNIA REPAIR      TONSILLECTOMY      trus/bx 2020      trus/bx 2021      WISDOM TOOTH EXTRACTION          SOCIAL HISTORY:   reports that he has never smoked. He has never used smokeless tobacco. He reports current alcohol use of about 1.0 standard drink of alcohol per week. He reports that he does not use drugs.     FAMILY HISTORY:  Family History   Problem Relation Name Age of Onset    Heart disease Father      Hypertension Father      Alzheimer's disease  Mother      No Known Problems Sister      No Known Problems Brother      Heart disease Maternal Grandmother      Heart attack Maternal Grandmother      No Known Problems Maternal Grandfather      No Known Problems Paternal Grandmother      No Known Problems Paternal Grandfather          ALLERGIES:   Review of patient's allergies indicates:  No Known Allergies    MEDICATIONS:    Current Outpatient Medications:     alfuzosin (UROXATRAL) 10 mg Tb24, Take 1 tablet (10 mg total) by mouth once daily., Disp: 90 tablet, Rfl: 3    aspirin (ECOTRIN) 81 MG EC tablet, Take 81 mg by mouth once daily., Disp: , Rfl:     CALCIUM ORAL, Take by mouth once daily., Disp: , Rfl:     coffee/theanine/superoxide dis (NEURIVA DE-STRESS ORAL), Take by mouth., Disp: , Rfl:     garlic Cap, Take by mouth once daily. , Disp: , Rfl:     glucosamine/chondr mercer A sod (OSTEO BI-FLEX ORAL), Take by mouth., Disp: , Rfl:     meclizine (ANTIVERT) 12.5 mg tablet, Take 1 tablet (12.5 mg total) by mouth 3 (three) times daily as needed for Dizziness., Disp: 90 tablet, Rfl: 0    multivitamin (THERAGRAN) per tablet, Take 1 tablet by mouth once daily., Disp: , Rfl:     omega-3 fatty acids 1,000 mg Cap, Take 1 g by mouth., Disp: , Rfl:     promethazine-dextromethorphan (PROMETHAZINE-DM) 6.25-15 mg/5 mL Syrp, Take 5 mLs by mouth every 12 (twelve) hours as needed., Disp: 100 mL, Rfl: 0    Current Facility-Administered Medications:     gentamicin injection 80 mg, 80 mg, Intramuscular, 1 time in Clinic/HOD, Rick Sanchez MD    Review of Systems   Constitutional:  Positive for chills, fatigue and fever.   HENT:  Positive for congestion, postnasal drip, rhinorrhea and sore throat.    Respiratory:  Positive for cough.    Cardiovascular: Negative.    Gastrointestinal: Negative.    All other systems reviewed and are negative.      Objective:      Physical Exam  Vitals reviewed.   Constitutional:       Appearance: Normal appearance.   HENT:      Right Ear:  Tympanic membrane normal.      Left Ear: Tympanic membrane normal.      Mouth/Throat:      Pharynx: Posterior oropharyngeal erythema (mild, diffuse) present.   Eyes:      Conjunctiva/sclera: Conjunctivae normal.   Cardiovascular:      Rate and Rhythm: Normal rate and regular rhythm.      Pulses: Normal pulses.      Heart sounds: Normal heart sounds.   Pulmonary:      Effort: Pulmonary effort is normal.      Breath sounds: Normal breath sounds.   Abdominal:      General: Bowel sounds are normal.   Skin:     General: Skin is warm and dry.   Neurological:      Mental Status: He is alert. Mental status is at baseline.   Psychiatric:         Mood and Affect: Mood normal.         Behavior: Behavior normal.         Thought Content: Thought content normal.         Judgment: Judgment normal.         Vitals:    07/10/24 1418   BP: 122/70   BP Location: Right arm   Patient Position: Sitting   BP Method: Large (Manual)   Pulse: 79   Resp: 16   Temp: 99.5 °F (37.5 °C)   TempSrc: Oral   SpO2: 95%   Weight: 75.4 kg (166 lb 3.6 oz)   Height: 6' (1.829 m)     Body mass index is 22.54 kg/m².    RESULTS: Reviewed labs from last 12 months    Last Lab Results:     Lab Results   Component Value Date    WBC 7.60 03/18/2022    HGB 14.4 03/18/2022    HCT 41.5 03/18/2022     03/18/2022     03/18/2022    K 3.9 03/18/2022     03/18/2022    CO2 21 (L) 03/18/2022    BUN 18 03/18/2022    CREATININE 1.1 03/18/2022    CALCIUM 9.4 03/18/2022    ALBUMIN 4.2 03/18/2022    AST 24 03/18/2022    ALT 20 03/18/2022    CHOL 211 (H) 10/11/2019    TRIG 78 10/11/2019    HDL 51 10/11/2019    LDLCALC 144.4 10/11/2019    HGBA1C 4.8 10/11/2019    TSH 0.907 10/11/2019    PSA 7.2 (H) 02/27/2020         Assessment & Plan:       1. Suspected COVID-19 virus infection  -     SARS-Cov2 (COVID) with FLU/RSV by PCR    See HPI, contact with the family member with COVID on Friday, symptoms started 2 days later.  Vitals fine, I will do a swab, patient will  quarantine until results, advised regarding drinking plenty of fluid and rest.  Will follow up when I have the results.  Patient lives by himself.  Discussed symptoms and signs of concern.    2. Upper respiratory tract infection, unspecified type  -     SARS-Cov2 (COVID) with FLU/RSV by PCR    See above.    3. Sore throat    See above, mild, rather scratchy at the beginning of the symptoms.    4. Cough, unspecified type  -     SARS-Cov2 (COVID) with FLU/RSV by PCR  -     promethazine-dextromethorphan (PROMETHAZINE-DM) 6.25-15 mg/5 mL Syrp; Take 5 mLs by mouth every 12 (twelve) hours as needed.  Dispense: 100 mL; Refill: 0    Will provide cough syrup.     5. Nasal congestion  -     SARS-Cov2 (COVID) with FLU/RSV by PCR       No follow-ups on file.    This note was created by combination of typed  and M-Modal dictation.  Transcription errors may be present.  If there are any questions, please contact me.

## 2024-07-10 NOTE — PROGRESS NOTES
Health Maintenance Due   Topic     Shingles Vaccine (1 of 2) CONSULT WITH PCP    High Dose Statin  CONSULT WITH PCP    RSV Vaccine (Age 60+ and Pregnant patients) (1 - 1-dose 60+ series) Not offered at this facility.    COVID-19 Vaccine (3 - Moderna risk series) Not offered at this facility.

## 2024-07-11 LAB
INFLUENZA A, MOLECULAR: NOT DETECTED
INFLUENZA B, MOLECULAR: NOT DETECTED
RSV AG BY MOLECULAR METHOD: NOT DETECTED
SARS-COV-2 RNA RESP QL NAA+PROBE: DETECTED

## 2024-09-25 DIAGNOSIS — R35.1 NOCTURIA MORE THAN TWICE PER NIGHT: ICD-10-CM

## 2024-09-25 RX ORDER — ALFUZOSIN HYDROCHLORIDE 10 MG/1
10 TABLET, EXTENDED RELEASE ORAL
Qty: 90 TABLET | Refills: 3 | Status: SHIPPED | OUTPATIENT
Start: 2024-09-25

## 2024-10-18 ENCOUNTER — LAB VISIT (OUTPATIENT)
Dept: LAB | Facility: HOSPITAL | Age: 69
End: 2024-10-18
Attending: UROLOGY
Payer: MEDICARE

## 2024-10-18 DIAGNOSIS — C61 PROSTATE CANCER: ICD-10-CM

## 2024-10-18 LAB — COMPLEXED PSA SERPL-MCNC: 0.71 NG/ML (ref 0–4)

## 2024-10-18 PROCEDURE — 36415 COLL VENOUS BLD VENIPUNCTURE: CPT | Performed by: UROLOGY

## 2024-10-18 PROCEDURE — 84153 ASSAY OF PSA TOTAL: CPT | Performed by: UROLOGY

## 2024-10-22 ENCOUNTER — OFFICE VISIT (OUTPATIENT)
Dept: UROLOGY | Facility: CLINIC | Age: 69
End: 2024-10-22
Payer: MEDICARE

## 2024-10-22 VITALS — WEIGHT: 163.13 LBS | BODY MASS INDEX: 22.13 KG/M2

## 2024-10-22 DIAGNOSIS — Z87.442 HISTORY OF KIDNEY STONES: ICD-10-CM

## 2024-10-22 DIAGNOSIS — R35.1 NOCTURIA MORE THAN TWICE PER NIGHT: ICD-10-CM

## 2024-10-22 DIAGNOSIS — N52.8 OTHER MALE ERECTILE DYSFUNCTION: ICD-10-CM

## 2024-10-22 DIAGNOSIS — C61 PROSTATE CANCER: Primary | ICD-10-CM

## 2024-10-22 PROCEDURE — 99999 PR PBB SHADOW E&M-EST. PATIENT-LVL III: CPT | Mod: PBBFAC,,, | Performed by: UROLOGY

## 2024-10-22 PROCEDURE — 99214 OFFICE O/P EST MOD 30 MIN: CPT | Mod: S$PBB,,, | Performed by: UROLOGY

## 2024-10-22 PROCEDURE — 99213 OFFICE O/P EST LOW 20 MIN: CPT | Mod: PBBFAC | Performed by: UROLOGY

## 2024-10-22 NOTE — PROGRESS NOTES
Subjective:       Patient ID: Epifanio Guallpa is a 69 y.o. male The patient's last visit with me was on 6/10/2024.     Chief Complaint:   Chief Complaint   Patient presents with    Follow-up    Results     Psa Results      Prostate Cancer   He underwent a prostate needle biopsy on 1/6/2020.  His biopsy was indicated due to: Elevated PSA.  Afterwards he experienced: Gross Hematuria and Blood in stool.  These symptoms have resolved.  His PSA prior to biopsy was 8.6.  His prostate size was 42 grams.  The ultrasound did not show a median lobe.  He currently does not have erectile dysfunction.  His pathology showed: prostate cancer.    Original Biopsy: 3+3=6 in 4/12 cores, Left Base/Mid    He met with Dr. Acevedo and agreed to active surveillance.  He has been doing active surveillance with Dr. Acevedo.  His last PSA was 8.0 on 1/22/2021.    He underwent a prostate needle biopsy on 5/6/2021.  His biopsy was indicated due to: Prostate Cancer.  Afterwards he experienced: Gross Hematuria and Blood in stool.  These symptoms have resolved.  His PSA prior to biopsy was 7.2.  His prostate size was 26 grams.  The ultrasound did not show a median lobe.  He currently does not have erectile dysfunction.  His pathology showed: Prostate Cancer.  Pathology: 3+4=7, 1 core 20%. 3+3=6, 15%    03/11/2022  Last visit his PSA remained higher.  He was unable to do the MRI due to the claustrophobic conditions.  He is here to discuss other options     3/29/2022  Markers were placed    5/18/2022  He saw Dr. Marx for an urgent visit regarding dysuria.  He finished his radiation by Dr. Acevedo at [facility] on 6/09/2022.    06/14/2022  He is feeling better.  The dysuria is much improved.     10/6/2022  He feels well.  No dysuria.  IPSS Questionnaire (AUA-7): 6    1/5/2022  He is doing well.  He has not been on Uroxatral for about a month and doing well.    6/27/2023  He feels well.  He denies hematuria or dysuria.  Cialis works.    10/5/2023  He  feels okay, he would like to restart Uroxatral.  His stream is not was strong.    1/12/2024  He is back with a new PSA.  He is taking Uroxatral.  He is not sure how well his stream is improved with the medication.  He does not want a procedure.    4/22/2024  He feels well.  He has no complaint.    6/10/2024  Last week he developed a dull back ache, penile pain, and urinary urgency.  This lasted about 3 days and has now resolved.  He denies fever or gross hematuria.    10/22/2024  He has some periodic ache.  None currently.  He is back with a new PSA.              ACTIVE MEDICAL ISSUES:  Patient Active Problem List   Diagnosis    Right inguinal hernia    S/P right inguinal hernia repair    RBBB    Aortic atherosclerosis    Dizziness and giddiness    Dizziness    Imbalance       ALLERGIES AND MEDICATIONS: updated and reviewed.  Review of patient's allergies indicates:  No Known Allergies  Current Outpatient Medications   Medication Sig    alfuzosin (UROXATRAL) 10 mg Tb24 TAKE 1 TABLET(10 MG) BY MOUTH EVERY DAY    aspirin (ECOTRIN) 81 MG EC tablet Take 81 mg by mouth once daily.    CALCIUM ORAL Take by mouth once daily.    coffee/theanine/superoxide dis (NEURIVA DE-STRESS ORAL) Take by mouth.    garlic Cap Take by mouth once daily.     glucosamine/chondr mercer A sod (OSTEO BI-FLEX ORAL) Take by mouth.    meclizine (ANTIVERT) 12.5 mg tablet Take 1 tablet (12.5 mg total) by mouth 3 (three) times daily as needed for Dizziness.    multivitamin (THERAGRAN) per tablet Take 1 tablet by mouth once daily.    omega-3 fatty acids 1,000 mg Cap Take 1 g by mouth.     Current Facility-Administered Medications   Medication    gentamicin injection 80 mg       Review of Systems   Constitutional:  Negative for activity change, fatigue, fever and unexpected weight change.   HENT:  Negative for congestion.    Eyes:  Negative for redness.   Respiratory:  Negative for chest tightness and shortness of breath.    Cardiovascular:  Negative for  chest pain and leg swelling.   Gastrointestinal:  Negative for abdominal pain, constipation, diarrhea, nausea and vomiting.   Genitourinary:  Negative for dysuria, flank pain, frequency, hematuria, penile pain, penile swelling, scrotal swelling, testicular pain and urgency.   Musculoskeletal:  Negative for arthralgias and back pain.   Neurological:  Negative for dizziness and light-headedness.   Psychiatric/Behavioral:  Negative for behavioral problems and confusion. The patient is not nervous/anxious.    All other systems reviewed and are negative.      Objective:      Vitals:    10/22/24 1016   Weight: 74 kg (163 lb 2.3 oz)                 Physical Exam  Vitals and nursing note reviewed.   Constitutional:       Appearance: He is well-developed.   HENT:      Head: Normocephalic.   Eyes:      Conjunctiva/sclera: Conjunctivae normal.   Neck:      Thyroid: No thyromegaly.      Trachea: No tracheal deviation.   Cardiovascular:      Rate and Rhythm: Normal rate.      Heart sounds: Normal heart sounds.   Pulmonary:      Effort: Pulmonary effort is normal. No respiratory distress.      Breath sounds: Normal breath sounds. No wheezing.   Abdominal:      General: Bowel sounds are normal.      Palpations: Abdomen is soft.      Tenderness: There is no abdominal tenderness. There is no rebound.      Hernia: No hernia is present.   Musculoskeletal:         General: No tenderness. Normal range of motion.      Cervical back: Normal range of motion and neck supple.   Lymphadenopathy:      Cervical: No cervical adenopathy.   Skin:     General: Skin is warm and dry.      Findings: No erythema or rash.   Neurological:      Mental Status: He is alert and oriented to person, place, and time.   Psychiatric:         Behavior: Behavior normal.         Thought Content: Thought content normal.         Judgment: Judgment normal.         Urine dipstick shows negative for all components.  Micro exam: negative for WBC's or RBC's.                Component Ref Range & Units 4 d ago  (10/18/24) 6 mo ago  (4/12/24) 9 mo ago  (1/5/24) 1 yr ago  (10/2/23) 1 yr ago  (6/28/23) 1 yr ago  (3/30/23) 1 yr ago  (12/28/22)   PSA Diagnostic 0.00 - 4.00 ng/mL 0.71 1.3 CM 1.3 CM 2.0 CM 1.3 CM 2.1 CM 1.6 CM   Comment: The testing method is a chemiluminescent microparticle immunoassay  manufactured by [employer] and performed on the [employer]  or  [employer] system. Values obtained with different assay manufacturers  for  methods may be different and cannot be used interchangeably.  PSA Expected levels:  Hormonal Therapy: <0.05 ng/ml  Prostatectomy: <0.01 ng/ml  Radiation Therapy: <1.00 ng/ml   Resulting Agency  [facility]              Narrative  Performed by: [facility]  6 months   Specimen Collected: 10/18/24 07:46 CDT       Assessment:       1. Prostate cancer    2. History of kidney stones    3. Other male erectile dysfunction    4. Nocturia more than twice per night                        Plan:       1. Prostate cancer (Primary)  Continue to monitor after radiation    - Prostate Specific Antigen, Diagnostic; Future    2. History of kidney stones  Monitor  We discussed a procedure, but he wants to wait    3. Other male erectile dysfunction  Viagra when needed    4. Nocturia more than twice per night  Uroxatral           Follow up in about 6 months (around 4/22/2025) for Follow up Established.

## 2025-04-22 ENCOUNTER — LAB VISIT (OUTPATIENT)
Dept: LAB | Facility: HOSPITAL | Age: 70
End: 2025-04-22
Attending: UROLOGY
Payer: MEDICARE

## 2025-04-22 DIAGNOSIS — C61 PROSTATE CANCER: ICD-10-CM

## 2025-04-22 LAB — PSA SERPL-MCNC: 0.94 NG/ML

## 2025-04-22 PROCEDURE — 36415 COLL VENOUS BLD VENIPUNCTURE: CPT

## 2025-04-22 PROCEDURE — 84153 ASSAY OF PSA TOTAL: CPT

## 2025-04-23 ENCOUNTER — RESULTS FOLLOW-UP (OUTPATIENT)
Dept: UROLOGY | Facility: HOSPITAL | Age: 70
End: 2025-04-23

## 2025-04-29 ENCOUNTER — OFFICE VISIT (OUTPATIENT)
Dept: UROLOGY | Facility: CLINIC | Age: 70
End: 2025-04-29
Payer: MEDICARE

## 2025-04-29 VITALS — WEIGHT: 164.88 LBS | BODY MASS INDEX: 22.37 KG/M2

## 2025-04-29 DIAGNOSIS — R35.1 NOCTURIA MORE THAN TWICE PER NIGHT: ICD-10-CM

## 2025-04-29 DIAGNOSIS — C61 PROSTATE CANCER: Primary | ICD-10-CM

## 2025-04-29 DIAGNOSIS — Z87.442 HISTORY OF KIDNEY STONES: ICD-10-CM

## 2025-04-29 DIAGNOSIS — N52.8 OTHER MALE ERECTILE DYSFUNCTION: ICD-10-CM

## 2025-04-29 PROCEDURE — 99999 PR PBB SHADOW E&M-EST. PATIENT-LVL III: CPT | Mod: PBBFAC,,, | Performed by: UROLOGY

## 2025-04-29 PROCEDURE — 99214 OFFICE O/P EST MOD 30 MIN: CPT | Mod: S$PBB,,, | Performed by: UROLOGY

## 2025-04-29 PROCEDURE — 99213 OFFICE O/P EST LOW 20 MIN: CPT | Mod: PBBFAC | Performed by: UROLOGY

## 2025-04-29 NOTE — PROGRESS NOTES
Subjective:       Patient ID: Epifanio Guallpa is a 70 y.o. male The patient's last visit with me was on 10/22/2024.     Chief Complaint:   Chief Complaint   Patient presents with    Follow-up     6m     Prostate Cancer   He underwent a prostate needle biopsy on 1/6/2020.  His biopsy was indicated due to: Elevated PSA.  Afterwards he experienced: Gross Hematuria and Blood in stool.  These symptoms have resolved.  His PSA prior to biopsy was 8.6.  His prostate size was 42 grams.  The ultrasound did not show a median lobe.  He currently does not have erectile dysfunction.  His pathology showed: prostate cancer.    Original Biopsy: 3+3=6 in 4/12 cores, Left Base/Mid    He met with Dr. Acevedo and agreed to active surveillance.  He has been doing active surveillance with Dr. Acevedo.  His last PSA was 8.0 on 1/22/2021.    He underwent a prostate needle biopsy on 5/6/2021.  His biopsy was indicated due to: Prostate Cancer.  Afterwards he experienced: Gross Hematuria and Blood in stool.  These symptoms have resolved.  His PSA prior to biopsy was 7.2.  His prostate size was 26 grams.  The ultrasound did not show a median lobe.  He currently does not have erectile dysfunction.  His pathology showed: Prostate Cancer.  Pathology: 3+4=7, 1 core 20%. 3+3=6, 15%    03/11/2022  Last visit his PSA remained higher.  He was unable to do the MRI due to the claustrophobic conditions.  He is here to discuss other options     3/29/2022  Markers were placed    5/18/2022  He saw Dr. Marx for an urgent visit regarding dysuria.  He finished his radiation by Dr. Acevedo at [facility] on 6/09/2022.    06/14/2022  He is feeling better.  The dysuria is much improved.     10/6/2022  He feels well.  No dysuria.  IPSS Questionnaire (AUA-7): 6    1/5/2022  He is doing well.  He has not been on Uroxatral for about a month and doing well.    6/27/2023  He feels well.  He denies hematuria or dysuria.  Cialis works.    10/5/2023  He feels okay, he would  like to restart Uroxatral.  His stream is not was strong.    1/12/2024  He is back with a new PSA.  He is taking Uroxatral.  He is not sure how well his stream is improved with the medication.  He does not want a procedure.    4/22/2024  He feels well.  He has no complaint.    6/10/2024  Last week he developed a dull back ache, penile pain, and urinary urgency.  This lasted about 3 days and has now resolved.  He denies fever or gross hematuria.    10/22/2024  He has some periodic ache.  None currently.  He is back with a new PSA.      04/29/2025  He is back with a new PSA.  He still has some pain on the left side at times.              ACTIVE MEDICAL ISSUES:  Patient Active Problem List   Diagnosis    Right inguinal hernia    S/P right inguinal hernia repair    RBBB    Aortic atherosclerosis    Dizziness and giddiness    Dizziness    Imbalance       ALLERGIES AND MEDICATIONS: updated and reviewed.  Review of patient's allergies indicates:  No Known Allergies  Current Outpatient Medications   Medication Sig    alfuzosin (UROXATRAL) 10 mg Tb24 TAKE 1 TABLET(10 MG) BY MOUTH EVERY DAY    aspirin (ECOTRIN) 81 MG EC tablet Take 81 mg by mouth once daily.    CALCIUM ORAL Take by mouth once daily.    coffee/theanine/superoxide dis (NEURIVA DE-STRESS ORAL) Take by mouth.    garlic Cap Take by mouth once daily.     glucosamine/chondr mercer A sod (OSTEO BI-FLEX ORAL) Take by mouth.    meclizine (ANTIVERT) 12.5 mg tablet Take 1 tablet (12.5 mg total) by mouth 3 (three) times daily as needed for Dizziness.    multivitamin (THERAGRAN) per tablet Take 1 tablet by mouth once daily.    omega-3 fatty acids 1,000 mg Cap Take 1 g by mouth.     Current Facility-Administered Medications   Medication    gentamicin injection 80 mg       Review of Systems   Constitutional:  Negative for activity change, fatigue, fever and unexpected weight change.   HENT:  Negative for congestion.    Eyes:  Negative for redness.   Respiratory:  Negative for  chest tightness and shortness of breath.    Cardiovascular:  Negative for chest pain and leg swelling.   Gastrointestinal:  Negative for abdominal pain, constipation, diarrhea, nausea and vomiting.   Genitourinary:  Negative for dysuria, flank pain, frequency, hematuria, penile pain, penile swelling, scrotal swelling, testicular pain and urgency.   Musculoskeletal:  Negative for arthralgias and back pain.   Neurological:  Negative for dizziness and light-headedness.   Psychiatric/Behavioral:  Negative for behavioral problems and confusion. The patient is not nervous/anxious.    All other systems reviewed and are negative.      Objective:      Vitals:    04/29/25 1027   Weight: 74.8 kg (164 lb 14.5 oz)                   Physical Exam  Vitals and nursing note reviewed.   Constitutional:       Appearance: He is well-developed.   HENT:      Head: Normocephalic.   Eyes:      Conjunctiva/sclera: Conjunctivae normal.   Neck:      Thyroid: No thyromegaly.      Trachea: No tracheal deviation.   Cardiovascular:      Rate and Rhythm: Normal rate.      Heart sounds: Normal heart sounds.   Pulmonary:      Effort: Pulmonary effort is normal. No respiratory distress.      Breath sounds: Normal breath sounds. No wheezing.   Abdominal:      General: Bowel sounds are normal.      Palpations: Abdomen is soft.      Tenderness: There is no abdominal tenderness. There is no rebound.      Hernia: No hernia is present.   Musculoskeletal:         General: No tenderness. Normal range of motion.      Cervical back: Normal range of motion and neck supple.   Lymphadenopathy:      Cervical: No cervical adenopathy.   Skin:     General: Skin is warm and dry.      Findings: No erythema or rash.   Neurological:      Mental Status: He is alert and oriented to person, place, and time.   Psychiatric:         Behavior: Behavior normal.         Thought Content: Thought content normal.         Judgment: Judgment normal.         Urine dipstick shows  negative for all components.  Micro exam: negative for WBC's or RBC's.           Component  Ref Range & Units (hover) 7 d ago  (4/22/25) 5 yr ago  (2/27/20) 5 yr ago  (10/11/19) 19 yr ago  (8/22/05)   Prostate Specific Antigen 0.94 7.2 High  R, CM 8.6 High  R, CM 0.7 R   Comment: PSA Expected levels:  Hormonal therapy: < 0.05 ng/mL  Prostatectomy: < 0.01 ng/mL  Radiation therapy: < 1.00 ng/mL   Resulting Agency [facility]              Narrative  Performed by: [facility]  The testing method is a chemiluminescent microparticle immunoassay manufactured by [employer] and performed on the [employer] or [employer] system. Values obtained with different assay manufacturers for methods may be different and cannot be used interchangeably.   Specimen Collected: 04/22/25 10:19 CDT Last Resulted: 04/22/25 16:25 CDT                  Component Ref Range & Units 4 d ago  (10/18/24) 6 mo ago  (4/12/24) 9 mo ago  (1/5/24) 1 yr ago  (10/2/23) 1 yr ago  (6/28/23) 1 yr ago  (3/30/23) 1 yr ago  (12/28/22)   PSA Diagnostic 0.00 - 4.00 ng/mL 0.71 1.3 CM 1.3 CM 2.0 CM 1.3 CM 2.1 CM 1.6 CM   Comment: The testing method is a chemiluminescent microparticle immunoassay  manufactured by [employer] and performed on the [employer]  or  [employer] system. Values obtained with different assay manufacturers  for  methods may be different and cannot be used interchangeably.  PSA Expected levels:  Hormonal Therapy: <0.05 ng/ml  Prostatectomy: <0.01 ng/ml  Radiation Therapy: <1.00 ng/ml   Resulting Agency  [facility]              Narrative  Performed by: [facility]  6 months   Specimen Collected: 10/18/24 07:46 CDT       Assessment:       1. Prostate cancer    2. History of kidney stones    3. Other male erectile dysfunction    4. Nocturia more than twice per night                          Plan:       1. Prostate cancer (Primary)  Continue to monitor after radiation    - Prostate Specific Antigen, Diagnostic;  Future    2. History of kidney stones  Monitor  We discussed a procedure, but he wants to wait    3. Other male erectile dysfunction  Viagra when needed    4. Nocturia more than twice per night  Uroxatral           Follow up in about 1 year (around 4/29/2026) for Follow up Established.

## 2025-06-30 ENCOUNTER — OFFICE VISIT (OUTPATIENT)
Dept: FAMILY MEDICINE | Facility: CLINIC | Age: 70
End: 2025-06-30
Payer: MEDICARE

## 2025-06-30 VITALS
WEIGHT: 166 LBS | SYSTOLIC BLOOD PRESSURE: 138 MMHG | HEART RATE: 70 BPM | HEIGHT: 72 IN | TEMPERATURE: 99 F | RESPIRATION RATE: 16 BRPM | BODY MASS INDEX: 22.48 KG/M2 | OXYGEN SATURATION: 98 % | DIASTOLIC BLOOD PRESSURE: 70 MMHG

## 2025-06-30 DIAGNOSIS — R21 RASH: Primary | ICD-10-CM

## 2025-06-30 DIAGNOSIS — R26.89 IMBALANCE: ICD-10-CM

## 2025-06-30 PROCEDURE — 99213 OFFICE O/P EST LOW 20 MIN: CPT | Mod: S$PBB,,,

## 2025-06-30 PROCEDURE — G2211 COMPLEX E/M VISIT ADD ON: HCPCS | Mod: ,,,

## 2025-06-30 PROCEDURE — 99999 PR PBB SHADOW E&M-EST. PATIENT-LVL III: CPT | Mod: PBBFAC,,,

## 2025-06-30 PROCEDURE — 99213 OFFICE O/P EST LOW 20 MIN: CPT | Mod: PBBFAC,PO

## 2025-06-30 RX ORDER — TRIAMCINOLONE ACETONIDE 1 MG/G
CREAM TOPICAL 2 TIMES DAILY
Qty: 80 G | Refills: 1 | Status: SHIPPED | OUTPATIENT
Start: 2025-06-30

## 2025-06-30 RX ORDER — CETIRIZINE HYDROCHLORIDE 10 MG/1
10 TABLET ORAL DAILY
Qty: 30 TABLET | Refills: 1 | Status: SHIPPED | OUTPATIENT
Start: 2025-06-30 | End: 2026-06-30

## 2025-06-30 NOTE — PROGRESS NOTES
Health Maintenance Due   Topic     Shingles Vaccine (1 of 2) hx chickenpox ; inform pt can get vaccine at pharmacy.    High Dose Statin  Consult with PCP    RSV Vaccine (Age 60+ and Pregnant patients) (1 - Risk 60-74 years 1-dose series) Not given at this facility       COVID-19 Vaccine (3 - Moderna risk series)     Lipid Panel      Colorectal Cancer Screening  Consult with PCP

## 2025-06-30 NOTE — PROGRESS NOTES
HPI     Chief Complaint:  Chief Complaint   Patient presents with    Herpes Zoster       Epifanio Guallpa is a 70 y.o. male with multiple medical diagnoses as listed in the medical history and problem list that presents for rash    HPI    History of Present Illness    CHIEF COMPLAINT:  Epifanio presents today for evaluation of a rash concerning for shingles    HISTORY OF PRESENT ILLNESS:  He reports a rash on the right side of his abdomen that started Saturday morning, characterized by itching with mild burning sensation. The rash initially appeared on his back before spreading to the right side of his abdomen. He notes the symptoms are predominantly itchy rather than painful. He reports a red spot on his chest, approximately fingertip-sized, present for 3-4 days prior to the back rash development. The chest lesion was initially thought to be a scratch but has not resolved. He denies presence of bumps or lumps within the red spot.    MEDICAL HISTORY:  He has a history of shingles at age 25, which presented as a round area of clear blisters.      ROS:  General: -fever, -chills, -fatigue, -weight gain, -weight loss  Eyes: -vision changes, -redness, -discharge  ENT: -ear pain, -nasal congestion, -sore throat  Cardiovascular: -chest pain, -palpitations, -lower extremity edema  Respiratory: -cough, -shortness of breath  Gastrointestinal: -abdominal pain, -nausea, -vomiting, -diarrhea, -constipation, -blood in stool  Genitourinary: -dysuria, -hematuria, -frequency  Musculoskeletal: -joint pain, -muscle pain  Skin: +rash, -lesion, +itching  Neurological: -headache, -dizziness, -numbness, -tingling  Psychiatric: -anxiety, -depression, -sleep difficulty             Assessment & Plan     Assessment & Plan    Assessed rash, noting it does not fully align with typical shingles presentation due to lack of vesicular blisters and atypical pattern.  Considered differential diagnoses, including age-related skin changes and other  rash etiologies.  Opted for conservative management with topical steroid and oral antihistamine initially, reserving antiviral therapy if symptoms worsen or evolve.  Recommend monitoring for development of blisters or expansion of rash.      PERSONAL HISTORY OF HERPES ZOSTER:  - Documented patient's previous herpes zoster infection at age 25, which presented with clear vesicles.    FOLLOW-UP:  - Follow up in the future for colorectal screening and labs.         Problem List Items Addressed This Visit       Rash - Primary    Relevant Medications    triamcinolone acetonide 0.1% (KENALOG) 0.1 % cream    cetirizine (ZYRTEC) 10 MG tablet  RASH:  - Epifanio presents with itching and burning sensation on the right side of abdomen with a flat rash that appeared Saturday morning.  - One spot is starting to look like a blister, but overall the rash is more pruritic than burning and differs from the pattern on the arm.  - Discussed that while this may be shingles, the presentation is atypical compared to the characteristic band-like rash with clustered blisters typically seen in shingles.  - Explained that shingles can worsen and become more contagious as blisters open and weep.  - Prescribed triamcinolone cream (topical steroid) to reduce pruritus and cetirizine 10 mg daily as an antihistamine for potential other spots.  - Instructed patient to contact office if rash worsens, develops vesicles, or does not improve with prescribed treatment.  - Advised that antiviral treatment may be necessary if symptoms worsen.  - Mentioned availability of zoster vaccine for prevention.  - Scheduled follow up in 3 days for condition update.     Imbalance  Stable    --------------------------------------------      Health Maintenance:  Health Maintenance         Date Due Completion Date    Shingles Vaccine (1 of 2) Never done ---    High Dose Statin Never done ---    RSV Vaccine (Age 60+ and Pregnant patients) (1 - Risk 60-74 years 1-dose  series) Never done ---    COVID-19 Vaccine (3 - Moderna risk series) 05/19/2021 4/21/2021    Lipid Panel 10/11/2024 10/11/2019    Colorectal Cancer Screening 11/21/2024 11/21/2023    Override on 9/21/2018: Done    Influenza Vaccine (Season Ended) 09/01/2025 11/16/2023    TETANUS VACCINE 10/18/2026 10/18/2016            Health maintenance reviewed and Advised patient on the importance of completing overdue health maintenance items    Follow Up:  Follow up if symptoms worsen or fail to improve.    Exam     Review of Systems:  (as noted above)  Review of Systems    Physical Exam:   Physical Exam  Vitals:    06/30/25 1019   BP: 138/70   BP Location: Right arm   Patient Position: Sitting   Pulse: 70   Resp: 16   Temp: 99 °F (37.2 °C)   TempSrc: Oral   SpO2: 98%   Weight: 75.3 kg (166 lb 0.1 oz)   Height: 6' (1.829 m)      Body mass index is 22.51 kg/m².    Physical Exam    General: No acute distress. Well-developed. Well-nourished.  Eyes: EOMI. Sclerae anicteric.  HENT: Normocephalic. Atraumatic. Nares patent.   Cardiovascular: Regular rate. Regular rhythm. No murmurs. No rubs. No gallops. Normal S1, S2.  Respiratory: Normal respiratory effort. Clear to auscultation bilaterally. No rales. No rhonchi. No wheezing.  Musculoskeletal: No  obvious deformity.  Extremities: No lower extremity edema.  Neurological: Alert & oriented x3. No slurred speech. Normal gait.  Psychiatric: Normal mood. Normal affect. Good insight. Good judgment.  Skin: Warm. Dry. No rash. Single blister-like lesion on right abdomen. Flat red spots on right abdomen.           History     Past Medical History:  Past Medical History:   Diagnosis Date    Anxiety     Kidney stone     Kidney stone     Prostate cancer        Past Surgical History:  Past Surgical History:   Procedure Laterality Date    HERNIA REPAIR      TONSILLECTOMY      trus/bx 2020      trus/bx 2021      WISDOM TOOTH EXTRACTION         Social History:  Social History[1]    Family  History:  Family History   Problem Relation Name Age of Onset    Heart disease Father      Hypertension Father      Alzheimer's disease Mother      No Known Problems Sister      No Known Problems Brother      Heart disease Maternal Grandmother      Heart attack Maternal Grandmother      No Known Problems Maternal Grandfather      No Known Problems Paternal Grandmother      No Known Problems Paternal Grandfather         Allergies and Medications: (updated and reviewed)  Review of patient's allergies indicates:  No Known Allergies  Current Medications[2]    No care team member to display         - The patient is given an After Visit Summary that lists all medications with directions, allergies, education, orders placed during this encounter and follow-up instructions.      - I have reviewed the patient's medical information including past medical, family, and social history sections including the medications and allergies.      - We discussed the patient's current medications.     This note was created by combination of typed  and MModal dictation.  Transcription errors may be present.  If there are any questions, please contact me.     This note was generated with the assistance of ambient listening technology. Verbal consent was obtained by the patient and accompanying visitor(s) for the recording of patient appointment to facilitate this note. I attest to having reviewed and edited the generated note for accuracy, though some syntax or spelling errors may persist. Please contact the author of this note for any clarification.          Sara Whitman PA-C                      [1]   Social History  Socioeconomic History    Marital status: Single   Tobacco Use    Smoking status: Never    Smokeless tobacco: Never   Substance and Sexual Activity    Alcohol use: Yes     Alcohol/week: 1.0 standard drink of alcohol     Types: 1 Glasses of wine per week     Comment: occasionally- once a week    Drug  use: No    Sexual activity: Not Currently     Partners: Female     Social Drivers of Health     Stress: Stress Concern Present (2/2/2021)    Received from [facility]    Citizen of [place] [employer] - Occupational Stress Questionnaire     Feeling of Stress : Rather much   [2]   Current Outpatient Medications   Medication Sig Dispense Refill    alfuzosin (UROXATRAL) 10 mg Tb24 TAKE 1 TABLET(10 MG) BY MOUTH EVERY DAY 90 tablet 3    aspirin (ECOTRIN) 81 MG EC tablet Take 81 mg by mouth once daily.      CALCIUM ORAL Take by mouth once daily.      coffee/theanine/superoxide dis (NEURIVA DE-STRESS ORAL) Take by mouth.      garlic Cap Take by mouth once daily.       glucosamine/chondr mercer A sod (OSTEO BI-FLEX ORAL) Take by mouth.      meclizine (ANTIVERT) 12.5 mg tablet Take 1 tablet (12.5 mg total) by mouth 3 (three) times daily as needed for Dizziness. 90 tablet 0    multivitamin (THERAGRAN) per tablet Take 1 tablet by mouth once daily.      omega-3 fatty acids 1,000 mg Cap Take 1 g by mouth.      cetirizine (ZYRTEC) 10 MG tablet Take 1 tablet (10 mg total) by mouth once daily. 30 tablet 1    triamcinolone acetonide 0.1% (KENALOG) 0.1 % cream Apply topically 2 (two) times daily. 80 g 1     Current Facility-Administered Medications   Medication Dose Route Frequency Provider Last Rate Last Admin    gentamicin injection 80 mg  80 mg Intramuscular 1 time in Clinic/HOD Rick Sanchez MD

## 2025-07-02 ENCOUNTER — PATIENT MESSAGE (OUTPATIENT)
Dept: FAMILY MEDICINE | Facility: CLINIC | Age: 70
End: 2025-07-02
Payer: MEDICARE

## 2025-07-14 ENCOUNTER — HOSPITAL ENCOUNTER (EMERGENCY)
Facility: HOSPITAL | Age: 70
Discharge: HOME | End: 2025-07-14
Attending: EMERGENCY MEDICINE
Payer: MEDICARE

## 2025-07-14 VITALS
WEIGHT: 162 LBS | HEIGHT: 72 IN | TEMPERATURE: 99 F | HEART RATE: 60 BPM | OXYGEN SATURATION: 100 % | DIASTOLIC BLOOD PRESSURE: 72 MMHG | SYSTOLIC BLOOD PRESSURE: 143 MMHG | BODY MASS INDEX: 21.94 KG/M2 | RESPIRATION RATE: 17 BRPM

## 2025-07-14 DIAGNOSIS — K40.90 INGUINAL HERNIA WITHOUT OBSTRUCTION OR GANGRENE, RECURRENCE NOT SPECIFIED, UNSPECIFIED LATERALITY: ICD-10-CM

## 2025-07-14 DIAGNOSIS — N13.30 HYDRONEPHROSIS, UNSPECIFIED HYDRONEPHROSIS TYPE: ICD-10-CM

## 2025-07-14 DIAGNOSIS — R10.31 RIGHT GROIN PAIN: Primary | ICD-10-CM

## 2025-07-14 LAB
ABSOLUTE EOSINOPHIL (OHS): 0.06 K/UL
ABSOLUTE MONOCYTE (OHS): 0.45 K/UL (ref 0.3–1)
ABSOLUTE NEUTROPHIL COUNT (OHS): 2.37 K/UL (ref 1.8–7.7)
ALBUMIN SERPL BCP-MCNC: 4 G/DL (ref 3.5–5.2)
ALP SERPL-CCNC: 60 UNIT/L (ref 40–150)
ALT SERPL W/O P-5'-P-CCNC: 16 UNIT/L (ref 10–44)
ANION GAP (OHS): 13 MMOL/L (ref 8–16)
AST SERPL-CCNC: 17 UNIT/L (ref 11–45)
BASOPHILS # BLD AUTO: 0.01 K/UL
BASOPHILS NFR BLD AUTO: 0.3 %
BILIRUB SERPL-MCNC: 1 MG/DL (ref 0.1–1)
BILIRUB UR QL STRIP.AUTO: NEGATIVE
BUN SERPL-MCNC: 16 MG/DL (ref 8–23)
CALCIUM SERPL-MCNC: 8.9 MG/DL (ref 8.7–10.5)
CHLORIDE SERPL-SCNC: 105 MMOL/L (ref 95–110)
CLARITY UR: CLEAR
CO2 SERPL-SCNC: 23 MMOL/L (ref 23–29)
COLOR UR AUTO: YELLOW
CREAT SERPL-MCNC: 1 MG/DL (ref 0.5–1.4)
ERYTHROCYTE [DISTWIDTH] IN BLOOD BY AUTOMATED COUNT: 12.6 % (ref 11.5–14.5)
GFR SERPLBLD CREATININE-BSD FMLA CKD-EPI: >60 ML/MIN/1.73/M2
GLUCOSE SERPL-MCNC: 93 MG/DL (ref 70–110)
GLUCOSE UR QL STRIP: NEGATIVE
HCT VFR BLD AUTO: 44 % (ref 40–54)
HGB BLD-MCNC: 14.8 GM/DL (ref 14–18)
HGB UR QL STRIP: NEGATIVE
IMM GRANULOCYTES # BLD AUTO: 0.02 K/UL (ref 0–0.04)
IMM GRANULOCYTES NFR BLD AUTO: 0.5 % (ref 0–0.5)
KETONES UR QL STRIP: NEGATIVE
LEUKOCYTE ESTERASE UR QL STRIP: NEGATIVE
LYMPHOCYTES # BLD AUTO: 1.07 K/UL (ref 1–4.8)
MAGNESIUM SERPL-MCNC: 1.8 MG/DL (ref 1.6–2.6)
MCH RBC QN AUTO: 32.3 PG (ref 27–31)
MCHC RBC AUTO-ENTMCNC: 33.6 G/DL (ref 32–36)
MCV RBC AUTO: 96 FL (ref 82–98)
NITRITE UR QL STRIP: NEGATIVE
NUCLEATED RBC (/100WBC) (OHS): 0 /100 WBC
PH UR STRIP: 5 [PH]
PLATELET # BLD AUTO: 180 K/UL (ref 150–450)
PMV BLD AUTO: 9.4 FL (ref 9.2–12.9)
POTASSIUM SERPL-SCNC: 3.6 MMOL/L (ref 3.5–5.1)
PROT SERPL-MCNC: 7.3 GM/DL (ref 6–8.4)
PROT UR QL STRIP: NEGATIVE
RBC # BLD AUTO: 4.58 M/UL (ref 4.6–6.2)
RELATIVE EOSINOPHIL (OHS): 1.5 %
RELATIVE LYMPHOCYTE (OHS): 26.9 % (ref 18–48)
RELATIVE MONOCYTE (OHS): 11.3 % (ref 4–15)
RELATIVE NEUTROPHIL (OHS): 59.5 % (ref 38–73)
SODIUM SERPL-SCNC: 141 MMOL/L (ref 136–145)
SP GR UR STRIP: 1.01
UROBILINOGEN UR STRIP-ACNC: NEGATIVE EU/DL
WBC # BLD AUTO: 3.98 K/UL (ref 3.9–12.7)

## 2025-07-14 PROCEDURE — 25500020 PHARM REV CODE 255: Performed by: EMERGENCY MEDICINE

## 2025-07-14 PROCEDURE — 83735 ASSAY OF MAGNESIUM: CPT

## 2025-07-14 PROCEDURE — 85025 COMPLETE CBC W/AUTO DIFF WBC: CPT

## 2025-07-14 PROCEDURE — 99285 EMERGENCY DEPT VISIT HI MDM: CPT | Mod: 25

## 2025-07-14 PROCEDURE — 81003 URINALYSIS AUTO W/O SCOPE: CPT

## 2025-07-14 PROCEDURE — 80053 COMPREHEN METABOLIC PANEL: CPT

## 2025-07-14 RX ADMIN — IOHEXOL 75 ML: 350 INJECTION, SOLUTION INTRAVENOUS at 08:07

## 2025-07-14 NOTE — ED PROVIDER NOTES
Encounter Date: 7/14/2025       History     Chief Complaint   Patient presents with    Groin Pain     R sided groin pain at the site of previous hernia repair  (2017) Pt states pain has been getting worse over the past 2 days. States last night the pain became constant and not relieved by Tylenol. Denies groin swelling. Denies urinary or bowel complaints.     HPI    Patient is a 70-year-old male with a history of prostate cancer status post radiation presenting to the ED with concerns regarding his hernia.  Patient reports that intermittently, he has had pain to the right groin, where his hernia had previously been repaired, but since last night, it was worse.  Unrelieved by Tylenol.    Denies abdominal pain, nausea, vomiting, new issues with the urination, bowel changes.    Review of patient's allergies indicates:  No Known Allergies  Past Medical History:   Diagnosis Date    Anxiety     Kidney stone     Kidney stone     Prostate cancer      Past Surgical History:   Procedure Laterality Date    HERNIA REPAIR      TONSILLECTOMY      trus/bx 2020      trus/bx 2021      WISDOM TOOTH EXTRACTION       Family History   Problem Relation Name Age of Onset    Heart disease Father      Hypertension Father      Alzheimer's disease Mother      No Known Problems Sister      No Known Problems Brother      Heart disease Maternal Grandmother      Heart attack Maternal Grandmother      No Known Problems Maternal Grandfather      No Known Problems Paternal Grandmother      No Known Problems Paternal Grandfather       Social History[1]  Review of Systems    Physical Exam     Initial Vitals [07/14/25 0640]   BP Pulse Resp Temp SpO2   (!) 154/81 75 16 98.4 °F (36.9 °C) 99 %      MAP       --         Physical Exam    Constitutional: He appears well-developed and well-nourished. He is not diaphoretic. No distress.   HENT:   Head: Normocephalic.   Cardiovascular:  Normal rate and regular rhythm.           Pulmonary/Chest: Breath sounds  normal. No respiratory distress. He has no wheezes.   Abdominal: Abdomen is soft. He exhibits no distension. There is no abdominal tenderness.   Genitourinary:    Penis normal.      Genitourinary Comments:  exam unremarkable  Right inguinal tenderness along the ligament     Musculoskeletal:         General: Normal range of motion.     Neurological: He is alert.   Skin: Skin is warm and dry.         ED Course   Procedures  Labs Reviewed   CBC WITH DIFFERENTIAL - Abnormal       Result Value    WBC 3.98      RBC 4.58 (*)     HGB 14.8      HCT 44.0      MCV 96      MCH 32.3 (*)     MCHC 33.6      RDW 12.6      Platelet Count 180      MPV 9.4      Nucleated RBC 0      Neut % 59.5      Lymph % 26.9      Mono % 11.3      Eos % 1.5      Basophil % 0.3      Imm Grans % 0.5      Neut # 2.37      Lymph # 1.07      Mono # 0.45      Eos # 0.06      Baso # 0.01      Imm Grans # 0.02     COMPREHENSIVE METABOLIC PANEL - Normal    Sodium 141      Potassium 3.6      Chloride 105      CO2 23      Glucose 93      BUN 16      Creatinine 1.0      Calcium 8.9      Protein Total 7.3      Albumin 4.0      Bilirubin Total 1.0      ALP 60      AST 17      ALT 16      Anion Gap 13      eGFR >60     URINALYSIS, REFLEX TO URINE CULTURE - Normal    Color, UA Yellow      Appearance, UA Clear      pH, UA 5.0      Spec Grav UA 1.015      Protein, UA Negative      Glucose, UA Negative      Ketones, UA Negative      Bilirubin, UA Negative      Blood, UA Negative      Nitrites, UA Negative      Urobilinogen, UA Negative      Leukocyte Esterase, UA Negative     MAGNESIUM - Normal    Magnesium  1.8     CBC W/ AUTO DIFFERENTIAL    Narrative:     The following orders were created for panel order CBC auto differential.  Procedure                               Abnormality         Status                     ---------                               -----------         ------                     CBC with Differential[[id number]]       Abnormal            Final  result                 Please view results for these tests on the individual orders.   [facility] URINE HOLD          Imaging Results              CT Abdomen Pelvis With IV Contrast NO Oral Contrast (Final result)  Result time 07/14/25 09:14:33      Final result by Shabbir Orantes MD (07/14/25 09:14:33)                   Impression:      1. Mildly prominent caliber of the right greater left renal collecting systems and upper to mid ureters. Noting limitations imposed by presence of intravenous contrast material, no definite obstructing ureteral stones identified.  Appearance is nonspecific, but could reflect sequela of previously passed stones, urinary retention, possibly urinary tract infection.  Clinical correlation recommended in this regard.  2. Small nonobstructing bilateral renal calculi.  3. Additional details of chronic and incidental findings, as provided in the body of the report.      Electronically signed by: Shabbir Orantes  Date:    07/14/2025  Time:    09:14               Narrative:    EXAMINATION:  CT ABDOMEN PELVIS WITH IV CONTRAST    CLINICAL HISTORY:  RLQ abdominal pain (Age >= 14y);    TECHNIQUE:  Low dose axial images, sagittal and coronal reformations were obtained from the lung bases to the pubic symphysis following the IV administration of 75 mL of Omnipaque 350    COMPARISON:  CT of the abdomen and pelvis performed 03/18/2022.    FINDINGS:  Lower chest: Unremarkable.    Liver: Unremarkable.    Gallbladder and bile ducts: Unremarkable. No biliary ductal dilatation.    Pancreas: Unremarkable.    Spleen: Unremarkable.    Adrenals: Unremarkable.    Kidneys: Mildly prominent caliber of the right greater left renal collecting systems and upper to mid ureters.  Noting limitations imposed by presence of intravenous contrast material, no definite obstructing ureteral stones identified.  Several small nonobstructing calculi are noted within both kidneys.  Nonspecific bilateral perinephric  stranding.    Lymph nodes: Mildly prominent number, but small retroperitoneal lymph nodes appears similar when compared to remote unenhanced CT imaging of 03/18/2022, allowing for differences in technique.    Bowel and mesentery: No evidence of bowel obstruction.  Moderate volume colonic stool.  Colonic diverticulosis.  Unremarkable appendix.    Abdominal aorta: Nonaneurysmal.  Mild calcifications.    Inferior vena cava: Unremarkable.    Free fluid or free air: No free air.  Trace free fluid dependently in the pelvis, possibly reactive.    Pelvis: Posttreatment changes are noted in the region the prostate gland in a patient with reported history of prostate cancer.    Body wall: Fluid density and soft tissue within the right inguinal canal would be in keeping reported history of prior hernia repair.  Small residual/recurrent fat containing right inguinal hernia.  Tiny in fat containing left inguinal hernia.    Bones: No definite acute abnormality.  Relatively minor degenerative changes of the spine and hips.                                       Medications   iohexoL (OMNIPAQUE 350) injection 75 mL (75 mLs Intravenous Given 7/14/25 0810)     Medical Decision Making  Amount and/or Complexity of Data Reviewed  Labs: ordered. Decision-making details documented in ED Course.  Radiology: ordered.    Risk  Prescription drug management.    Patient is a 70-year-old male with a history of prostate cancer status post radiation presenting to the ED with concerns regarding his hernia.     On presentation, he is hemodynamically stable, overall well-appearing, no acute distress.    Given age with a history of prostate cancer and right inguinal pain, we will order basic labs and CT scan.  Fortunately, no remarkable abnormalities beyond right hydronephrosis.  Symptoms could be secondary to ureterolithiasis, however no stones visualized on the CT scan.  Labs normal.    Patient was safely safely discharged home with strict return  precautions.  He was told to follow up with Urology, General surgery, his PCP.           ED Course as of 07/14/25 1459   Mon Jul 14, 2025   0733 70-year-old male history of previous prostate cancer status post radiation, previous inguinal hernia repair.  The patient is presenting for roughly 3 weeks of right inguinal discomfort worsened last night.  Patient denies any injury.  The patient denies any dysuria hematuria, urinary frequency, radiation to the flanks, back, abdomen.  The patient reports pain is constant.  He denies seeing any overlying lesions.  He denies any changes to his urinary pattern or bowel movements.  Denies any rectal pain, melena, hematochezia.      Physical exam   Abdomen soft nondistended nontender.  No CVAT.  No inguinal hernia.  No abdominal masses palpated.  Tenderness of the right groin without palpable mass or induration.  No testicular edema or tenderness.     MDM   No palpable lesion noted.  No hernia noted.  UA negative for urinary infection.  CT imaging revealed nonspecific right-sided hydro nephrosis to without stone or mass.  Recommended follow up with primary care for further evaluation. [JOSEPH]   0805 CBC auto differential(!)  No leukocytosis or anemia []   0805 Comprehensive metabolic panel  No electrolyte abnormality; normal renal function []   0805 Urinalysis, Reflex to Urine Culture Urine, Clean Catch  Non-infections, no hematuria []   0805 Magnesium : 1.8 []      ED Course User Index  [] Kita Eden DO  [JOSEPH] Jermaine Mathis MD                               Clinical Impression:  Final diagnoses:  [R10.31] Right groin pain (Primary)  [K40.90] Inguinal hernia without obstruction or gangrene, recurrence not specified, unspecified laterality  [N13.30] Hydronephrosis, unspecified hydronephrosis type          ED Disposition Condition    Discharge Stable          ED Prescriptions    None       Follow-up Information       Follow up With Specialties Details Why Contact Info     [facility] Dept Emergency Medicine Go to  If symptoms worsen, As needed [id number] [facility] [place] [address] [phone]                   [1]   Social History  Tobacco Use    Smoking status: Never    Smokeless tobacco: Never   Substance Use Topics    Alcohol use: Yes     Alcohol/week: 1.0 standard drink of alcohol     Types: 1 Glasses of wine per week     Comment: occasionally- once a week    Drug use: No        Kita Eden DO  Resident  07/14/25 8470

## 2025-07-14 NOTE — DISCHARGE INSTRUCTIONS
Diagnosis: Right groin pain    You were evaluated today for right groin pain.  Your labs and CT scan are reassuring that your pain is likely not related to a hernia complication.     Tests today showed:   Labs Reviewed   CBC WITH DIFFERENTIAL - Abnormal       Result Value    WBC 3.98      RBC 4.58 (*)     HGB 14.8      HCT 44.0      MCV 96      MCH 32.3 (*)     MCHC 33.6      RDW 12.6      Platelet Count 180      MPV 9.4      Nucleated RBC 0      Neut % 59.5      Lymph % 26.9      Mono % 11.3      Eos % 1.5      Basophil % 0.3      Imm Grans % 0.5      Neut # 2.37      Lymph # 1.07      Mono # 0.45      Eos # 0.06      Baso # 0.01      Imm Grans # 0.02     COMPREHENSIVE METABOLIC PANEL - Normal    Sodium 141      Potassium 3.6      Chloride 105      CO2 23      Glucose 93      BUN 16      Creatinine 1.0      Calcium 8.9      Protein Total 7.3      Albumin 4.0      Bilirubin Total 1.0      ALP 60      AST 17      ALT 16      Anion Gap 13      eGFR >60     URINALYSIS, REFLEX TO URINE CULTURE - Normal    Color, UA Yellow      Appearance, UA Clear      pH, UA 5.0      Spec Grav UA 1.015      Protein, UA Negative      Glucose, UA Negative      Ketones, UA Negative      Bilirubin, UA Negative      Blood, UA Negative      Nitrites, UA Negative      Urobilinogen, UA Negative      Leukocyte Esterase, UA Negative     MAGNESIUM - Normal    Magnesium  1.8     CBC W/ AUTO DIFFERENTIAL    Narrative:     The following orders were created for panel order CBC auto differential.  Procedure                               Abnormality         Status                     ---------                               -----------         ------                     CBC with Differential[[id number]]       Abnormal            Final result                 Please view results for these tests on the individual orders.   [facility] URINE HOLD     CT Abdomen Pelvis With IV Contrast NO Oral Contrast   Final Result      1. Mildly prominent caliber of the  right greater left renal collecting systems and upper to mid ureters. Noting limitations imposed by presence of intravenous contrast material, no definite obstructing ureteral stones identified.  Appearance is nonspecific, but could reflect sequela of previously passed stones, urinary retention, possibly urinary tract infection.  Clinical correlation recommended in this regard.   2. Small nonobstructing bilateral renal calculi.   3. Additional details of chronic and incidental findings, as provided in the body of the report.         Electronically signed by: Shabbir Orantes   Date:    07/14/2025   Time:    09:14          Treatments you had today:   Medications   iohexoL (OMNIPAQUE 350) injection 75 mL (75 mLs Intravenous Given 7/14/25 0810)       Follow-Up Plan:  - Follow-up with primary care doctor within 3 - 5 days  - Additional testing and/or evaluation as directed by your primary doctor    Return to the Emergency Department for symptoms including but not limited to: worsening symptoms, shortness of breath or chest pain, vomiting with inability to hold down fluids, fevers greater than 100.4°F, passing out/fainting/unconsciousness, or other concerning symptoms.

## 2025-07-14 NOTE — ED TRIAGE NOTES
Pt presents to ED via POV with c/o RIGHT groin pain that started last night. Reports hc hernia repair in this area several years ago. Does not have bulge to area. Denies recent heavy lifting. Denies N/V/D, fever, hematuria, dysuria, or any other symptoms. Took tylenol with minimal relief.

## 2025-07-15 LAB — HOLD SPECIMEN: NORMAL

## 2025-07-30 ENCOUNTER — OFFICE VISIT (OUTPATIENT)
Dept: UROLOGY | Facility: CLINIC | Age: 70
End: 2025-07-30
Payer: MEDICARE

## 2025-07-30 VITALS — BODY MASS INDEX: 22.54 KG/M2 | WEIGHT: 166.25 LBS

## 2025-07-30 DIAGNOSIS — N52.8 OTHER MALE ERECTILE DYSFUNCTION: ICD-10-CM

## 2025-07-30 DIAGNOSIS — Z87.442 HISTORY OF KIDNEY STONES: ICD-10-CM

## 2025-07-30 DIAGNOSIS — C61 PROSTATE CANCER: Primary | ICD-10-CM

## 2025-07-30 DIAGNOSIS — R35.1 NOCTURIA MORE THAN TWICE PER NIGHT: ICD-10-CM

## 2025-07-30 PROCEDURE — 99999 PR PBB SHADOW E&M-EST. PATIENT-LVL III: CPT | Mod: PBBFAC,,, | Performed by: UROLOGY

## 2025-07-30 PROCEDURE — 99213 OFFICE O/P EST LOW 20 MIN: CPT | Mod: PBBFAC | Performed by: UROLOGY

## 2025-07-30 PROCEDURE — 99214 OFFICE O/P EST MOD 30 MIN: CPT | Mod: S$PBB,,, | Performed by: UROLOGY

## 2025-07-30 NOTE — PROGRESS NOTES
Subjective:       Patient ID: Epifanio Guallpa is a 70 y.o. male The patient's last visit with me was on 4/29/2025.     Chief Complaint:   Chief Complaint   Patient presents with    Follow-up    Groin Pain     Right side      Prostate Cancer   He underwent a prostate needle biopsy on 1/6/2020.  His biopsy was indicated due to: Elevated PSA.  Afterwards he experienced: Gross Hematuria and Blood in stool.  These symptoms have resolved.  His PSA prior to biopsy was 8.6.  His prostate size was 42 grams.  The ultrasound did not show a median lobe.  He currently does not have erectile dysfunction.  His pathology showed: prostate cancer.    Original Biopsy: 3+3=6 in 4/12 cores, Left Base/Mid    He met with Dr. Acevedo and agreed to active surveillance.  He has been doing active surveillance with Dr. Acevedo.  His last PSA was 8.0 on 1/22/2021.    He underwent a prostate needle biopsy on 5/6/2021.  His biopsy was indicated due to: Prostate Cancer.  Afterwards he experienced: Gross Hematuria and Blood in stool.  These symptoms have resolved.  His PSA prior to biopsy was 7.2.  His prostate size was 26 grams.  The ultrasound did not show a median lobe.  He currently does not have erectile dysfunction.  His pathology showed: Prostate Cancer.  Pathology: 3+4=7, 1 core 20%. 3+3=6, 15%    03/11/2022  Last visit his PSA remained higher.  He was unable to do the MRI due to the claustrophobic conditions.  He is here to discuss other options     3/29/2022  Markers were placed    5/18/2022  He saw Dr. Marx for an urgent visit regarding dysuria.  He finished his radiation by Dr. Acevedo at [facility] on 6/09/2022.    06/14/2022  He is feeling better.  The dysuria is much improved.     10/6/2022  He feels well.  No dysuria.  IPSS Questionnaire (AUA-7): 6    1/5/2022  He is doing well.  He has not been on Uroxatral for about a month and doing well.    6/27/2023  He feels well.  He denies hematuria or dysuria.  Cialis works.    10/5/2023  He  feels okay, he would like to restart Uroxatral.  His stream is not was strong.    1/12/2024  He is back with a new PSA.  He is taking Uroxatral.  He is not sure how well his stream is improved with the medication.  He does not want a procedure.    4/22/2024  He feels well.  He has no complaint.    6/10/2024  Last week he developed a dull back ache, penile pain, and urinary urgency.  This lasted about 3 days and has now resolved.  He denies fever or gross hematuria.    10/22/2024  He has some periodic ache.  None currently.  He is back with a new PSA.      4/29/2025  He is back with a new PSA.  He still has some pain on the left side at times.    07/30/2025  He went to the ED recently with right sided groin pain.  The pain is intermittent.              ACTIVE MEDICAL ISSUES:  Patient Active Problem List   Diagnosis    Right inguinal hernia    S/P right inguinal hernia repair    RBBB    Aortic atherosclerosis    Dizziness and giddiness    Dizziness    Imbalance    Rash       ALLERGIES AND MEDICATIONS: updated and reviewed.  Review of patient's allergies indicates:  No Known Allergies  Current Outpatient Medications   Medication Sig    alfuzosin (UROXATRAL) 10 mg Tb24 TAKE 1 TABLET(10 MG) BY MOUTH EVERY DAY    aspirin (ECOTRIN) 81 MG EC tablet Take 81 mg by mouth once daily.    CALCIUM ORAL Take by mouth once daily.    cetirizine (ZYRTEC) 10 MG tablet Take 1 tablet (10 mg total) by mouth once daily.    coffee/theanine/superoxide dis (NEURIVA DE-STRESS ORAL) Take by mouth.    garlic Cap Take by mouth once daily.     glucosamine/chondr mercer A sod (OSTEO BI-FLEX ORAL) Take by mouth.    meclizine (ANTIVERT) 12.5 mg tablet Take 1 tablet (12.5 mg total) by mouth 3 (three) times daily as needed for Dizziness.    multivitamin (THERAGRAN) per tablet Take 1 tablet by mouth once daily.    omega-3 fatty acids 1,000 mg Cap Take 1 g by mouth.    triamcinolone acetonide 0.1% (KENALOG) 0.1 % cream Apply topically 2 (two) times daily.      Current Facility-Administered Medications   Medication    gentamicin injection 80 mg       Review of Systems   Constitutional:  Negative for activity change, fatigue, fever and unexpected weight change.   HENT:  Negative for congestion.    Eyes:  Negative for redness.   Respiratory:  Negative for chest tightness and shortness of breath.    Cardiovascular:  Negative for chest pain and leg swelling.   Gastrointestinal:  Negative for abdominal pain, constipation, diarrhea, nausea and vomiting.   Genitourinary:  Negative for dysuria, flank pain, frequency, hematuria, penile pain, penile swelling, scrotal swelling, testicular pain and urgency.   Musculoskeletal:  Negative for arthralgias and back pain.   Neurological:  Negative for dizziness and light-headedness.   Psychiatric/Behavioral:  Negative for behavioral problems and confusion. The patient is not nervous/anxious.    All other systems reviewed and are negative.      Objective:      Vitals:    07/30/25 1258   Weight: 75.4 kg (166 lb 3.6 oz)                     Physical Exam  Vitals and nursing note reviewed.   Constitutional:       Appearance: He is well-developed.   HENT:      Head: Normocephalic.   Eyes:      Conjunctiva/sclera: Conjunctivae normal.   Neck:      Thyroid: No thyromegaly.      Trachea: No tracheal deviation.   Cardiovascular:      Rate and Rhythm: Normal rate.      Heart sounds: Normal heart sounds.   Pulmonary:      Effort: Pulmonary effort is normal. No respiratory distress.      Breath sounds: Normal breath sounds. No wheezing.   Abdominal:      General: Bowel sounds are normal.      Palpations: Abdomen is soft.      Tenderness: There is no abdominal tenderness. There is no rebound.      Hernia: No hernia is present.   Musculoskeletal:         General: No tenderness. Normal range of motion.      Cervical back: Normal range of motion and neck supple.   Lymphadenopathy:      Cervical: No cervical adenopathy.   Skin:     General: Skin is warm  and dry.      Findings: No erythema or rash.   Neurological:      Mental Status: He is alert and oriented to person, place, and time.   Psychiatric:         Behavior: Behavior normal.         Thought Content: Thought content normal.         Judgment: Judgment normal.         Urine dipstick shows negative for all components.  Micro exam: negative for WBC's or RBC's.    CT Abdomen Pelvis With IV Contrast NO Oral Contrast  Order: [id number]   Status: Final result       Next appt: Today at 01:00 PM in Urology (Rick Sanchez MD)    Test Result Released: Yes (not seen)    0 Result Notes  Details    Reading Physician Reading Date Result Priority   Shabbir Orantes MD  [phone] 7/14/2025 STAT     Narrative & Impression  EXAMINATION:  CT ABDOMEN PELVIS WITH IV CONTRAST     CLINICAL HISTORY:  RLQ abdominal pain (Age >= 14y);     TECHNIQUE:  Low dose axial images, sagittal and coronal reformations were obtained from the lung bases to the pubic symphysis following the IV administration of 75 mL of Omnipaque 350     COMPARISON:  CT of the abdomen and pelvis performed 03/18/2022.     FINDINGS:  Lower chest: Unremarkable.     Liver: Unremarkable.     Gallbladder and bile ducts: Unremarkable. No biliary ductal dilatation.     Pancreas: Unremarkable.     Spleen: Unremarkable.     Adrenals: Unremarkable.     Kidneys: Mildly prominent caliber of the right greater left renal collecting systems and upper to mid ureters.  Noting limitations imposed by presence of intravenous contrast material, no definite obstructing ureteral stones identified.  Several small nonobstructing calculi are noted within both kidneys.  Nonspecific bilateral perinephric stranding.     Lymph nodes: Mildly prominent number, but small retroperitoneal lymph nodes appears similar when compared to remote unenhanced CT imaging of 03/18/2022, allowing for differences in technique.     Bowel and mesentery: No evidence of bowel obstruction.  Moderate  volume colonic stool.  Colonic diverticulosis.  Unremarkable appendix.     Abdominal aorta: Nonaneurysmal.  Mild calcifications.     Inferior vena cava: Unremarkable.     Free fluid or free air: No free air.  Trace free fluid dependently in the pelvis, possibly reactive.     Pelvis: Posttreatment changes are noted in the region the prostate gland in a patient with reported history of prostate cancer.     Body wall: Fluid density and soft tissue within the right inguinal canal would be in keeping reported history of prior hernia repair.  Small residual/recurrent fat containing right inguinal hernia.  Tiny in fat containing left inguinal hernia.     Bones: No definite acute abnormality.  Relatively minor degenerative changes of the spine and hips.     Impression:     1. Mildly prominent caliber of the right greater left renal collecting systems and upper to mid ureters. Noting limitations imposed by presence of intravenous contrast material, no definite obstructing ureteral stones identified.  Appearance is nonspecific, but could reflect sequela of previously passed stones, urinary retention, possibly urinary tract infection.  Clinical correlation recommended in this regard.  2. Small nonobstructing bilateral renal calculi.  3. Additional details of chronic and incidental findings, as provided in the body of the report.        Electronically signed by:Shabbir Orantes  Date:                                            07/14/2025  Time:                                           09:14        Exam Ended: 07/14/25 08:16 CDT           Assessment:       1. Prostate cancer    2. Other male erectile dysfunction    3. Nocturia more than twice per night    4. History of kidney stones                            Plan:       1. Prostate cancer (Primary)  PSA in April 2026    2. Other male erectile dysfunction  stable    3. Nocturia more than twice per night  Limit evening fluids    4. History of kidney stones    - US Retroperitoneal  Complete; Future     He mentioned some chest pain a few days ago.  I recommended seeing PCP.            Follow up in about 39 weeks (around 4/29/2026) for Follow up Established, Review PSA, Review X-ray.

## 2025-07-31 ENCOUNTER — OFFICE VISIT (OUTPATIENT)
Dept: FAMILY MEDICINE | Facility: CLINIC | Age: 70
End: 2025-07-31
Payer: MEDICARE

## 2025-07-31 VITALS
WEIGHT: 165.13 LBS | DIASTOLIC BLOOD PRESSURE: 78 MMHG | HEIGHT: 72 IN | OXYGEN SATURATION: 97 % | HEART RATE: 89 BPM | SYSTOLIC BLOOD PRESSURE: 118 MMHG | BODY MASS INDEX: 22.37 KG/M2 | RESPIRATION RATE: 16 BRPM | TEMPERATURE: 99 F

## 2025-07-31 DIAGNOSIS — Z12.11 ENCOUNTER FOR COLORECTAL CANCER SCREENING: ICD-10-CM

## 2025-07-31 DIAGNOSIS — Z12.12 ENCOUNTER FOR COLORECTAL CANCER SCREENING: ICD-10-CM

## 2025-07-31 DIAGNOSIS — I45.10 RBBB: ICD-10-CM

## 2025-07-31 DIAGNOSIS — R07.9 CHEST PAIN, UNSPECIFIED TYPE: Primary | ICD-10-CM

## 2025-07-31 DIAGNOSIS — R21 RASH: ICD-10-CM

## 2025-07-31 LAB
OHS QRS DURATION: 140 MS
OHS QTC CALCULATION: 431 MS

## 2025-07-31 PROCEDURE — 99999 PR PBB SHADOW E&M-EST. PATIENT-LVL IV: CPT | Mod: PBBFAC,,,

## 2025-07-31 PROCEDURE — G2211 COMPLEX E/M VISIT ADD ON: HCPCS | Mod: ,,,

## 2025-07-31 PROCEDURE — 99214 OFFICE O/P EST MOD 30 MIN: CPT | Mod: PBBFAC,PO

## 2025-07-31 PROCEDURE — 99214 OFFICE O/P EST MOD 30 MIN: CPT | Mod: S$PBB,,,

## 2025-07-31 PROCEDURE — 93005 ELECTROCARDIOGRAM TRACING: CPT | Mod: PBBFAC,PO | Performed by: INTERNAL MEDICINE

## 2025-07-31 PROCEDURE — 93010 ELECTROCARDIOGRAM REPORT: CPT | Mod: S$PBB,,, | Performed by: INTERNAL MEDICINE

## 2025-07-31 RX ORDER — HYDROCODONE BITARTRATE AND ACETAMINOPHEN 7.5; 325 MG/1; MG/1
1 TABLET ORAL EVERY 6 HOURS PRN
COMMUNITY
Start: 2025-02-20

## 2025-07-31 RX ORDER — AMOXICILLIN 500 MG/1
500 CAPSULE ORAL 3 TIMES DAILY
COMMUNITY
Start: 2025-02-20

## 2025-07-31 RX ORDER — IBUPROFEN 600 MG/1
600 TABLET, FILM COATED ORAL EVERY 6 HOURS PRN
COMMUNITY
Start: 2025-02-20

## 2025-07-31 NOTE — PROGRESS NOTES
HPI     Chief Complaint:  Chief Complaint   Patient presents with    Follow-up     Three days ago had chest pain       Epifanio Guallpa is a 70 y.o. male with multiple medical diagnoses as listed in the medical history and problem list that presents for Chest pain 3 days ago.     HPI    History of Present Illness    CHIEF COMPLAINT:  Epifanio presents today for chest pain that occurred on Monday.    HISTORY OF PRESENT ILLNESS:  He reports a sudden chest pain episode on Monday while performing laundry. The pain was located in the middle of the chest, lasted approximately 5 minutes, and was severe enough that he was preparing to go to the emergency room. He describes the pain as different from previous gas pains, noting it was more alarming and scared him. The pain resolved spontaneously after 5 minutes and has not recurred. He denies associated symptoms including arm pain, nausea, and sweating. He emphasizes this is the first time he has experienced such pain and it was distinctly different from his prior experiences with gas-related chest discomfort. Despite the chest pain episode, he has remained active since Monday, engaging in yard work, light exercise, and walking, including cutting grass in hot weather without experiencing any subsequent symptoms or limitations.    MEDICATIONS:  He is currently taking baby aspirin 81 mg daily.    MEDICAL HISTORY:  He has a history of shingles in his early 20s and is uncertain about receiving the shingles vaccine, believing he may have developed immunity from his prior infection.      ROS:  General: -fever, -chills, -fatigue, -weight gain, -weight loss  Eyes: -vision changes, -redness, -discharge  ENT: -ear pain, -nasal congestion, -sore throat  Cardiovascular: +chest pain, -palpitations, -lower extremity edema  Respiratory: -cough, -shortness of breath  Gastrointestinal: -abdominal pain, -nausea, -vomiting, -diarrhea, -constipation, -blood in stool  Genitourinary: -dysuria,  -hematuria, -frequency  Musculoskeletal: -joint pain, -muscle pain  Skin: -rash, -lesion  Neurological: -headache, -dizziness, -numbness, -tingling  Psychiatric: -anxiety, -depression, -sleep difficulty             Assessment & Plan     Assessment & Plan    Assessed reported chest pain episode, considering cardiac etiology given sudden onset and location, noting one-time occurrence with resolution within 5 minutes and no recurrence since Monday, and absence of associated symptoms like arm pain, nausea, or sweating.  Evaluated need for further cardiac workup, including potential stress test if symptoms recur or persist to simulate strenuous activity and evaluate cardiac-related pain.  Weighed benefits of shingles vaccine despite history of shingles in early 20s, noting antibody decline after 90 days post-infection.                  Problem List Items Addressed This Visit       RBBB    Relevant Orders    IN OFFICE EKG 12-LEAD (to [place])  LONG-TERM ASPIRIN USE:  - Continued baby aspirin therapy for the patient.    FOLLOW-UP:  - Recommend follow up if symptoms return.     Rash  Resolved since previous rash from my previous encounter with the patient    HISTORY OF SHINGLES:  - Assessed that the patient has a history of shingles in their early 20s.  - Discussed the shingles vaccine as an option considering this history.     Other Visit Diagnoses         Chest pain, unspecified type    -  Primary    Relevant Orders    IN OFFICE EKG 12-LEAD (to [place])  CHEST PAIN:  - Monitored the patient's sudden chest pain in the middle of the chest that occurred while taking laundry out of the washing machine.  - The pain lasted for 5 minutes and resolved on its own with no recurrence since Monday.  - Epifanio reports no pain down the arm, no nausea, and no diaphoresis associated with the chest pain.  - Obtained updated EKG for baseline comparison against 2018 results.  - Assessed the possibility of cardiac-related chest pain and potential  need for a stress test if pain recurs, especially during strenuous activities.  - Advised the patient to go to the emergency room if chest pain recurs, particularly if prolonged in duration, for immediate labsup and evaluation.  - Recommend taking it easy and paying attention to symptoms.      Encounter for colorectal cancer screening        Relevant Orders    Cologuard Screening (Multitarget Stool DNA)  GENERAL HEALTH RECOMMENDATIONS:  - Epifanio to drink plenty of water, especially in hot weather.  - Ordered Cologuard test for colorectal cancer screening and educated patient on the process.  - Instructed the patient to bring Cologuard test paperwork to next visit for documentation in chart.              --------------------------------------------      Health Maintenance:  Health Maintenance         Date Due Completion Date    Shingles Vaccine (1 of 2) Never done ---    High Dose Statin Never done ---    RSV Vaccine (Age 60+ and Pregnant patients) (1 - Risk 60-74 years 1-dose series) Never done ---    COVID-19 Vaccine (3 - Moderna risk series) 05/19/2021 4/21/2021    Lipid Panel 10/11/2024 10/11/2019    Colorectal Cancer Screening 11/21/2024 11/21/2023    Override on 9/21/2018: Done    Influenza Vaccine (1) 09/01/2025 11/16/2023    TETANUS VACCINE 10/18/2026 10/18/2016            Health maintenance reviewed and Advised patient on the importance of completing overdue health maintenance items    Follow Up:  Follow up if symptoms worsen or fail to improve.    Exam     Review of Systems:  (as noted above)  Review of Systems    Physical Exam:   Physical Exam  Vitals:    07/31/25 1019   BP: 118/78   BP Location: Left arm   Patient Position: Sitting   Pulse: 89   Resp: 16   Temp: 99.2 °F (37.3 °C)   TempSrc: Oral   SpO2: 97%   Weight: 74.9 kg (165 lb 2 oz)   Height: 6' (1.829 m)      Body mass index is 22.39 kg/m².    Physical Exam    General: No acute distress. Well-developed. Well-nourished.  Eyes: EOMI. Sclerae  anicteric.  HENT: Normocephalic. Atraumatic. Nares patent.   Cardiovascular: Regular rate. Regular rhythm. No murmurs. No rubs. No gallops. Normal S1, S2.  Respiratory: Normal respiratory effort. Clear to auscultation bilaterally. No rales. No rhonchi. No wheezing.  Musculoskeletal: No  obvious deformity.  Extremities: No lower extremity edema.  Neurological: Alert & oriented x3. No slurred speech. Normal gait.  Psychiatric: Normal mood. Normal affect. Good insight. Good judgment.  Skin: Warm. Dry. No rash.           History     Past Medical History:  Past Medical History:   Diagnosis Date    Anxiety     Kidney stone     Kidney stone     Prostate cancer        Past Surgical History:  Past Surgical History:   Procedure Laterality Date    HERNIA REPAIR      TONSILLECTOMY      trus/bx 2020      trus/bx 2021      WISDOM TOOTH EXTRACTION         Social History:  Social History[1]    Family History:  Family History   Problem Relation Name Age of Onset    Heart disease Father      Hypertension Father      Alzheimer's disease Mother      No Known Problems Sister      No Known Problems Brother      Heart disease Maternal Grandmother      Heart attack Maternal Grandmother      No Known Problems Maternal Grandfather      No Known Problems Paternal Grandmother      No Known Problems Paternal Grandfather         Allergies and Medications: (updated and reviewed)  Review of patient's allergies indicates:  No Known Allergies  Current Medications[2]    No care team member to display         - The patient is given an After Visit Summary that lists all medications with directions, allergies, education, orders placed during this encounter and follow-up instructions.      - I have reviewed the patient's medical information including past medical, family, and social history sections including the medications and allergies.      - We discussed the patient's current medications.     This note was created by combination of typed   and MModal dictation.  Transcription errors may be present.  If there are any questions, please contact me.     This note was generated with the assistance of ambient listening technology. Verbal consent was obtained by the patient and accompanying visitor(s) for the recording of patient appointment to facilitate this note. I attest to having reviewed and edited the generated note for accuracy, though some syntax or spelling errors may persist. Please contact the author of this note for any clarification.          Sara Whitman PA-C                      [1]   Social History  Socioeconomic History    Marital status: Single   Tobacco Use    Smoking status: Never    Smokeless tobacco: Never   Substance and Sexual Activity    Alcohol use: Yes     Alcohol/week: 1.0 standard drink of alcohol     Types: 1 Glasses of wine per week     Comment: occasionally- once a week    Drug use: No    Sexual activity: Not Currently     Partners: Female     Social Drivers of Health     Stress: Stress Concern Present (2/2/2021)    Received from [facility]    [facility] - Occupational Stress Questionnaire     Feeling of Stress : Rather much   [2]   Current Outpatient Medications   Medication Sig Dispense Refill    alfuzosin (UROXATRAL) 10 mg Tb24 TAKE 1 TABLET(10 MG) BY MOUTH EVERY DAY 90 tablet 3    amoxicillin (AMOXIL) 500 MG capsule Take 500 mg by mouth 3 (three) times daily.      aspirin (ECOTRIN) 81 MG EC tablet Take 81 mg by mouth once daily.      CALCIUM ORAL Take by mouth once daily.      cetirizine (ZYRTEC) 10 MG tablet Take 1 tablet (10 mg total) by mouth once daily. 30 tablet 1    coffee/theanine/superoxide dis (NEURIVA DE-STRESS ORAL) Take by mouth.      garlic Cap Take by mouth once daily.       glucosamine/chondr mercer A sod (OSTEO BI-FLEX ORAL) Take by mouth.      HYDROcodone-acetaminophen (NORCO) 7.5-325 mg per tablet Take 1 tablet by mouth every 6 (six) hours as needed.       ibuprofen (ADVIL,MOTRIN) 600 MG tablet Take 600 mg by mouth every 6 (six) hours as needed.      meclizine (ANTIVERT) 12.5 mg tablet Take 1 tablet (12.5 mg total) by mouth 3 (three) times daily as needed for Dizziness. 90 tablet 0    multivitamin (THERAGRAN) per tablet Take 1 tablet by mouth once daily.      omega-3 fatty acids 1,000 mg Cap Take 1 g by mouth.      triamcinolone acetonide 0.1% (KENALOG) 0.1 % cream Apply topically 2 (two) times daily. 80 g 1     Current Facility-Administered Medications   Medication Dose Route Frequency Provider Last Rate Last Admin    gentamicin injection 80 mg  80 mg Intramuscular 1 time in Clinic/HOD Rick Sanchez MD

## 2025-08-11 PROBLEM — R21 RASH: Status: RESOLVED | Noted: 2025-06-30 | Resolved: 2025-08-11

## 2025-08-26 ENCOUNTER — TELEPHONE (OUTPATIENT)
Dept: SURGERY | Facility: CLINIC | Age: 70
End: 2025-08-26
Payer: MEDICARE

## 2025-08-27 ENCOUNTER — TELEPHONE (OUTPATIENT)
Dept: SURGERY | Facility: CLINIC | Age: 70
End: 2025-08-27
Payer: MEDICARE

## (undated) DEVICE — TRAY MINOR GEN SURG

## (undated) DEVICE — ADHESIVE MASTISOL VIAL 48/BX

## (undated) DEVICE — DRESSING TELFA PAD N ADH 2X3

## (undated) DEVICE — NDL 18GA X1 1/2 REG BEVEL

## (undated) DEVICE — DRESSING TELFA STRL 4X3 LF

## (undated) DEVICE — DRAIN PENROSE XRAY 12 X 1/4 ST

## (undated) DEVICE — APPLICATOR CHLORAPREP ORN 26ML

## (undated) DEVICE — SEE MEDLINE ITEM 157148

## (undated) DEVICE — SEE MEDLINE ITEM 152622

## (undated) DEVICE — CLOSURE SKIN STERI STRIP 1/4X3

## (undated) DEVICE — SUT MCRYL PLUS 4-0 PS2 27IN

## (undated) DEVICE — SUT PROLENE 2-0 30 SH

## (undated) DEVICE — SUT VICRYL 3-0 27 SH

## (undated) DEVICE — GOWN SURGICAL X-LARGE

## (undated) DEVICE — DRESSING TRANS 4X4 TEGADERM

## (undated) DEVICE — ELECTRODE REM PLYHSV RETURN 9

## (undated) DEVICE — SUT CTD VICRYL 3-0 UND BR

## (undated) DEVICE — CLOSURE SKIN STERI STRIP 1/2X4

## (undated) DEVICE — SEE MEDLINE ITEM 157117